# Patient Record
Sex: MALE | Race: WHITE | HISPANIC OR LATINO | URBAN - METROPOLITAN AREA
[De-identification: names, ages, dates, MRNs, and addresses within clinical notes are randomized per-mention and may not be internally consistent; named-entity substitution may affect disease eponyms.]

---

## 2017-07-17 ENCOUNTER — INPATIENT (INPATIENT)
Facility: HOSPITAL | Age: 32
LOS: 17 days | Discharge: HOME | DRG: 418 | End: 2017-08-04
Attending: SURGERY | Admitting: SURGERY
Payer: COMMERCIAL

## 2017-07-17 ENCOUNTER — TRANSCRIPTION ENCOUNTER (OUTPATIENT)
Age: 32
End: 2017-07-17

## 2017-07-17 VITALS
HEART RATE: 73 BPM | TEMPERATURE: 98 F | RESPIRATION RATE: 16 BRPM | DIASTOLIC BLOOD PRESSURE: 92 MMHG | SYSTOLIC BLOOD PRESSURE: 143 MMHG | OXYGEN SATURATION: 97 %

## 2017-07-17 DIAGNOSIS — K80.10 CALCULUS OF GALLBLADDER WITH CHRONIC CHOLECYSTITIS WITHOUT OBSTRUCTION: ICD-10-CM

## 2017-07-17 DIAGNOSIS — R63.8 OTHER SYMPTOMS AND SIGNS CONCERNING FOOD AND FLUID INTAKE: ICD-10-CM

## 2017-07-17 DIAGNOSIS — R74.0 NONSPECIFIC ELEVATION OF LEVELS OF TRANSAMINASE AND LACTIC ACID DEHYDROGENASE [LDH]: ICD-10-CM

## 2017-07-17 DIAGNOSIS — K80.67 CALCULUS OF GALLBLADDER AND BILE DUCT WITH ACUTE AND CHRONIC CHOLECYSTITIS WITH OBSTRUCTION: ICD-10-CM

## 2017-07-17 DIAGNOSIS — Z29.9 ENCOUNTER FOR PROPHYLACTIC MEASURES, UNSPECIFIED: ICD-10-CM

## 2017-07-17 PROBLEM — Z00.00 ENCOUNTER FOR PREVENTIVE HEALTH EXAMINATION: Status: ACTIVE | Noted: 2017-07-17

## 2017-07-17 LAB
ALBUMIN SERPL ELPH-MCNC: 4.9 G/DL — SIGNIFICANT CHANGE UP (ref 3.3–5)
ALP SERPL-CCNC: 243 U/L — HIGH (ref 40–120)
ALT FLD-CCNC: 1107 U/L RC — HIGH (ref 10–45)
ANION GAP SERPL CALC-SCNC: 17 MMOL/L — SIGNIFICANT CHANGE UP (ref 5–17)
APTT BLD: 33.3 SEC — SIGNIFICANT CHANGE UP (ref 27.5–37.4)
AST SERPL-CCNC: 764 U/L — HIGH (ref 10–40)
BASOPHILS # BLD AUTO: 0.1 K/UL — SIGNIFICANT CHANGE UP (ref 0–0.2)
BASOPHILS NFR BLD AUTO: 0.9 % — SIGNIFICANT CHANGE UP (ref 0–2)
BILIRUB DIRECT SERPL-MCNC: 4.9 MG/DL — HIGH (ref 0–0.2)
BILIRUB INDIRECT FLD-MCNC: 4.4 MG/DL — HIGH (ref 0.2–1)
BILIRUB SERPL-MCNC: 9.3 MG/DL — HIGH (ref 0.2–1.2)
BILIRUB SERPL-MCNC: 9.3 MG/DL — HIGH (ref 0.2–1.2)
BUN SERPL-MCNC: 15 MG/DL — SIGNIFICANT CHANGE UP (ref 7–23)
CALCIUM SERPL-MCNC: 10.6 MG/DL — HIGH (ref 8.4–10.5)
CHLORIDE SERPL-SCNC: 95 MMOL/L — LOW (ref 96–108)
CO2 SERPL-SCNC: 26 MMOL/L — SIGNIFICANT CHANGE UP (ref 22–31)
CREAT SERPL-MCNC: 1.11 MG/DL — SIGNIFICANT CHANGE UP (ref 0.5–1.3)
EOSINOPHIL # BLD AUTO: 0.4 K/UL — SIGNIFICANT CHANGE UP (ref 0–0.5)
EOSINOPHIL NFR BLD AUTO: 4.9 % — SIGNIFICANT CHANGE UP (ref 0–6)
GAS PNL BLDV: SIGNIFICANT CHANGE UP
GLUCOSE SERPL-MCNC: 93 MG/DL — SIGNIFICANT CHANGE UP (ref 70–99)
HCT VFR BLD CALC: 50.6 % — HIGH (ref 39–50)
HGB BLD-MCNC: 16.6 G/DL — SIGNIFICANT CHANGE UP (ref 13–17)
INR BLD: 1.15 RATIO — SIGNIFICANT CHANGE UP (ref 0.88–1.16)
LYMPHOCYTES # BLD AUTO: 1.5 K/UL — SIGNIFICANT CHANGE UP (ref 1–3.3)
LYMPHOCYTES # BLD AUTO: 17 % — SIGNIFICANT CHANGE UP (ref 13–44)
MCHC RBC-ENTMCNC: 29.9 PG — SIGNIFICANT CHANGE UP (ref 27–34)
MCHC RBC-ENTMCNC: 32.8 GM/DL — SIGNIFICANT CHANGE UP (ref 32–36)
MCV RBC AUTO: 91.3 FL — SIGNIFICANT CHANGE UP (ref 80–100)
MONOCYTES # BLD AUTO: 0.7 K/UL — SIGNIFICANT CHANGE UP (ref 0–0.9)
MONOCYTES NFR BLD AUTO: 7.6 % — SIGNIFICANT CHANGE UP (ref 2–14)
NEUTROPHILS # BLD AUTO: 6.2 K/UL — SIGNIFICANT CHANGE UP (ref 1.8–7.4)
NEUTROPHILS NFR BLD AUTO: 69.7 % — SIGNIFICANT CHANGE UP (ref 43–77)
PLATELET # BLD AUTO: 233 K/UL — SIGNIFICANT CHANGE UP (ref 150–400)
POTASSIUM SERPL-MCNC: 3.8 MMOL/L — SIGNIFICANT CHANGE UP (ref 3.5–5.3)
POTASSIUM SERPL-SCNC: 3.8 MMOL/L — SIGNIFICANT CHANGE UP (ref 3.5–5.3)
PROT SERPL-MCNC: 8.7 G/DL — HIGH (ref 6–8.3)
PROTHROM AB SERPL-ACNC: 12.5 SEC — SIGNIFICANT CHANGE UP (ref 9.8–12.7)
RBC # BLD: 5.54 M/UL — SIGNIFICANT CHANGE UP (ref 4.2–5.8)
RBC # FLD: 12.3 % — SIGNIFICANT CHANGE UP (ref 10.3–14.5)
SODIUM SERPL-SCNC: 138 MMOL/L — SIGNIFICANT CHANGE UP (ref 135–145)
WBC # BLD: 8.9 K/UL — SIGNIFICANT CHANGE UP (ref 3.8–10.5)
WBC # FLD AUTO: 8.9 K/UL — SIGNIFICANT CHANGE UP (ref 3.8–10.5)

## 2017-07-17 PROCEDURE — 99252 IP/OBS CONSLTJ NEW/EST SF 35: CPT

## 2017-07-17 PROCEDURE — 76705 ECHO EXAM OF ABDOMEN: CPT | Mod: 26

## 2017-07-17 PROCEDURE — 99285 EMERGENCY DEPT VISIT HI MDM: CPT

## 2017-07-17 PROCEDURE — 99222 1ST HOSP IP/OBS MODERATE 55: CPT | Mod: GC

## 2017-07-17 RX ORDER — PIPERACILLIN AND TAZOBACTAM 4; .5 G/20ML; G/20ML
3.38 INJECTION, POWDER, LYOPHILIZED, FOR SOLUTION INTRAVENOUS ONCE
Qty: 0 | Refills: 0 | Status: COMPLETED | OUTPATIENT
Start: 2017-07-17 | End: 2017-07-17

## 2017-07-17 RX ORDER — ONDANSETRON 8 MG/1
4 TABLET, FILM COATED ORAL ONCE
Qty: 0 | Refills: 0 | Status: COMPLETED | OUTPATIENT
Start: 2017-07-17 | End: 2017-07-17

## 2017-07-17 RX ORDER — SODIUM CHLORIDE 9 MG/ML
1000 INJECTION INTRAMUSCULAR; INTRAVENOUS; SUBCUTANEOUS ONCE
Qty: 0 | Refills: 0 | Status: COMPLETED | OUTPATIENT
Start: 2017-07-17 | End: 2017-07-17

## 2017-07-17 RX ORDER — PIPERACILLIN AND TAZOBACTAM 4; .5 G/20ML; G/20ML
3.38 INJECTION, POWDER, LYOPHILIZED, FOR SOLUTION INTRAVENOUS EVERY 8 HOURS
Qty: 0 | Refills: 0 | Status: DISCONTINUED | OUTPATIENT
Start: 2017-07-17 | End: 2017-07-18

## 2017-07-17 RX ORDER — SODIUM CHLORIDE 9 MG/ML
1000 INJECTION INTRAMUSCULAR; INTRAVENOUS; SUBCUTANEOUS
Qty: 0 | Refills: 0 | Status: DISCONTINUED | OUTPATIENT
Start: 2017-07-17 | End: 2017-07-21

## 2017-07-17 RX ORDER — HEPARIN SODIUM 5000 [USP'U]/ML
5000 INJECTION INTRAVENOUS; SUBCUTANEOUS EVERY 8 HOURS
Qty: 0 | Refills: 0 | Status: DISCONTINUED | OUTPATIENT
Start: 2017-07-17 | End: 2017-07-18

## 2017-07-17 RX ADMIN — SODIUM CHLORIDE 1000 MILLILITER(S): 9 INJECTION INTRAMUSCULAR; INTRAVENOUS; SUBCUTANEOUS at 17:07

## 2017-07-17 RX ADMIN — PIPERACILLIN AND TAZOBACTAM 25 GRAM(S): 4; .5 INJECTION, POWDER, LYOPHILIZED, FOR SOLUTION INTRAVENOUS at 23:01

## 2017-07-17 RX ADMIN — SODIUM CHLORIDE 150 MILLILITER(S): 9 INJECTION INTRAMUSCULAR; INTRAVENOUS; SUBCUTANEOUS at 23:02

## 2017-07-17 RX ADMIN — ONDANSETRON 4 MILLIGRAM(S): 8 TABLET, FILM COATED ORAL at 17:07

## 2017-07-17 RX ADMIN — HEPARIN SODIUM 5000 UNIT(S): 5000 INJECTION INTRAVENOUS; SUBCUTANEOUS at 23:40

## 2017-07-17 RX ADMIN — PIPERACILLIN AND TAZOBACTAM 200 GRAM(S): 4; .5 INJECTION, POWDER, LYOPHILIZED, FOR SOLUTION INTRAVENOUS at 18:47

## 2017-07-17 NOTE — H&P ADULT - NSHPREVIEWOFSYSTEMS_GEN_ALL_CORE
Review of Systems:   CONSTITUTIONAL: No fever, weight changes, fatigue, + decreased appetite   EYES: No eye pain, visual disturbances, or discharge  ENMT:  No difficulty hearing, tinnitus, vertigo; No sinus or throat pain  NECK: No pain or stiffness  RESPIRATORY: No cough, wheezing, chills or hemoptysis; No shortness of breath  CARDIOVASCULAR: No chest pain, palpitations, dizziness, or leg swelling  GASTROINTESTINAL:  No vomiting, or hematemesis; No diarrhea or constipation. No melena or hematochezia. +abdominal pain.  GENITOURINARY: No dysuria, frequency, hematuria, or incontinence. + Dark urine  NEUROLOGICAL: No headaches, memory loss, loss of strength, numbness, or tremors  SKIN: No itching, burning, or lesions. + chronic eczema   ENDOCRINE: No heat or cold intolerance; No hair loss  MUSCULOSKELETAL: No joint pain or swelling; No muscle, back, or extremity pain  PSYCHIATRIC: No depression, anxiety, mood swings, or difficulty sleeping  HEME/LYMPH: No easy bruising, or bleeding gums  ALLERY AND IMMUNOLOGIC: No hives or eczema

## 2017-07-17 NOTE — ED PROVIDER NOTE - PHYSICAL EXAMINATION
Bettye Hart, DO:   Gen: Well appearing, NAD  Head: NCAT  HEENT: PERRL, MMM with mucosal icterus, normal conjunctiva, icteric, neck supple  Lung: CTAB, no adventitious sounds  CV: RRR, no murmurs, rubs or gallops  Abd: soft, TTP in RUQ with +[place] otherwise NT, no rebound or guarding, no CVAT  MSK: No edema, no visible deformities  Neuro: No focal neurologic deficits. CN II-XII grossly intact. 5/5 strength and normal sensation in all extremities.  Skin: Warm and dry, no evidence of rash  Psych: normal mood and affect Bettye Hart, DO:   Gen: Well appearing, NAD  Head: NCAT  HEENT: PERRL, MMM with mucosal jaundice, normal conjunctiva, icteric, neck supple  Lung: CTAB, no adventitious sounds  CV: RRR, no murmurs, rubs or gallops  Abd: soft, TTP in RUQ with +[place] otherwise NT, no rebound or guarding, no CVAT  MSK: No edema, no visible deformities  Neuro: No focal neurologic deficits. CN II-XII grossly intact. 5/5 strength and normal sensation in all extremities.  Skin: Warm and dry, no evidence of rash  Psych: normal mood and affect

## 2017-07-17 NOTE — ED ADULT NURSE NOTE - NS ED NURSE LEVEL OF CONSCIOUSNESS SPEECH
Problem: Goal Outcome Summary  Goal: Goal Outcome Summary  PT 7B: Pt tx supine<>sit and sit<>stand mod I. Pt amb 125 feet + 325 feet with 1 UE support at IV pole and SBA. Pt agreeable to trial amb with no UE support for repeated bouts of ~10-15 feet, with SBA-CGA. Pt negotiated 4 stairs with SBA and BUE support at handrails, to mimic home environment. SpO2 85-93% on RA to 1 L O2. Pending continued progress with amb, anticipate disch home with assist and OP PT to maximize strength and IND.           Speaking Coherently

## 2017-07-17 NOTE — H&P ADULT - ASSESSMENT
31 y/o M w/ no sig PMHx p/w R epigastric pain x 3 days and dark urine, found to have US finding concerning for choledocolithiasis and acute cholecystitis.

## 2017-07-17 NOTE — ED ADULT NURSE REASSESSMENT NOTE - NS ED NURSE REASSESS COMMENT FT1
1700. A/O x3, NAD, denies pain, no dizziness/lightheadedness, awaiting U/S results, family at bedside.

## 2017-07-17 NOTE — ED PROVIDER NOTE - NS ED ROS FT
Bettye Hart, DO: ROS: denies HA, weakness, dizziness, SOB, diaphoresis, joint pain, neuro deficits, dysuria/hematuria, rash.

## 2017-07-17 NOTE — ED ADULT NURSE NOTE - OBJECTIVE STATEMENT
Pt is a 33 yo M who came to the Ed amb c/o epigastric pain x 3days. Seen at an urgicenter today, sent to the ED for evaluation of dark urine.  Pain is constant, worse with deep breaths. States he woke up this morning with no pain, but pain returned about 15 minutes later. No fevers/chills, nausea/vomiting, diarrhea. No change in bowel/urine habits.

## 2017-07-17 NOTE — H&P ADULT - NSHPSOCIALHISTORY_GEN_ALL_CORE
Social History:    Marital Status:  (   )    ( X  ) Single    (   )    (  )   Occupation:    Lives with: ( X ) alone  (  ) children   (  ) spouse   (  ) parents  (  ) other    Substance Use (street drugs): ( X ) never used  (  ) other:  Tobacco Usage:  (  X ) never smoked   (   ) former smoker   (   ) current smoker  (     ) pack year  (        ) last cigarette date  Alcohol Usage: Social drinker, last drink 2-3 weeks ago

## 2017-07-17 NOTE — H&P ADULT - FAMILY HISTORY
Mother  Still living? Unknown  Family history of coronary artery disease in mother, Age at diagnosis: Age Unknown     Father  Still living? Unknown  Family history of gallbladder disease, Age at diagnosis: Age Unknown     Grandparent  Still living? Unknown  Family history of gallbladder disease, Age at diagnosis: Age Unknown

## 2017-07-17 NOTE — H&P ADULT - PROBLEM SELECTOR PLAN 1
- dilated CBD and echo signs of acute heaven and +Alarcon sign  - will check MRCP and GI consulted, can consider ERCP pending MRCP results  - Surgery consulted for possible cholecystectomy   - - dilated CBD and echo signs of acute heaven and +Alarcon sign  - will check MRCP and GI consulted, can consider ERCP pending MRCP results  - Surgery consulted for possible cholecystectomy   - cover with zosyn for now   - NS at 150cc/hr

## 2017-07-17 NOTE — H&P ADULT - NSHPLABSRESULTS_GEN_ALL_CORE
16.6   8.9   )-----------( 233      ( 17 Jul 2017 17:08 )             50.6       07-17    138  |  95<L>  |  15  ----------------------------<  93  3.8   |  26  |  1.11    Ca    10.6<H>      17 Jul 2017 17:08    TPro  8.7<H>  /  Alb  4.9  /  TBili  9.3<H>  /  DBili  4.9<H>  /  AST  764<H>  /  ALT  1107<H>  /  AlkPhos  243<H>  07-17           PT/INR - ( 17 Jul 2017 17:08 )   PT: 12.5 sec;   INR: 1.15 ratio         PTT - ( 17 Jul 2017 17:08 )  PTT:33.3 sec    Lipase, Serum: 44 U/L (07.17.17 @ 17:08)        < from: US Echo Abdomen Limited (ED) (07.17.17 @ 17:31) >    IMPRESSION:Dilated CBD suggesting choledocolithiasis, with additional   findings concerning for acute cholecystitis    < end of copied text >

## 2017-07-17 NOTE — ED PROVIDER NOTE - PROGRESS NOTE DETAILS
Bettye Hart, DO: Surgery paged - will see pt. Pending call back from GI. Bettye Hart DO: GI consulted - will see patient. Bettye Hart DO: Left message for Dr. Jenkins's answering service. Bettye Hart DO: Dr. Jenkins admits to hospitalist - will admit.

## 2017-07-17 NOTE — ED ADULT NURSE REASSESSMENT NOTE - NS ED NURSE REASSESS COMMENT FT1
report received from mayra gudino. pt resting comfortably in bed. pt denies chest pain/sob/n/v/pain/fever/chills. pt complaining of decreased po intake. vital signs stable. breath sounds clear and equal bilaterally. skin warm dry and intact. AOX3. ambulates with steady gait. admitting team at bedside. safety maintained. will continue to monitor.

## 2017-07-17 NOTE — H&P ADULT - NSHPPHYSICALEXAM_GEN_ALL_CORE
PHYSICAL EXAM:    GENERAL: Comfortable, no acute distress   HEAD:  Normocephalic, atraumatic  EYES: EOMI, PERRLA, sclero icterus   HEENT: Moist mucous membranes  NECK: Supple, No JVD  NERVOUS SYSTEM:  Alert & Oriented X3, Motor Strength 5/5 B/L upper and lower extremities  CHEST/LUNG: Clear to auscultation bilaterally  HEART: Regular rate and rhythm, no murmur   ABDOMEN: Soft, Nondistended, Bowel sounds present, + tenderness in RUQ, +Alarcon sign   EXTREMITIES:   No clubbing, cyanosis, or edema  MUSCULOSKELETAL: No muscle tenderness, no joint tenderness  SKIN:  warm and dry, no rash

## 2017-07-17 NOTE — H&P ADULT - ATTENDING COMMENTS
NIGHT HOSPITALIST:  7/17/2017 22:44   Patient UNKNOWN to me previously, assigned to me at this point via the ER to admit this 31 y/o M with no prior past medical history--patient seen with patient's father in attendance--patient followed by Dr. Jenkins as above--patient self refers to [place] following 3 days of RIGHT to epigastric pain with scleral icterus and dark urine with diaphoresis and nausea.  Patient with symptoms with no radiation.  No red blood per rectum or melena.  No back pain, no tearing back pain.  No chest pain/pressure.  No dyspnea.  No fever, no rigors.  No weight loss.  Denies anorexia.  No HA, no focal weakness.  No sick contacts.  Remaining review of systems not contributory.  No tobacco or ethanol history.  Family history as above.  Physical exam with an obese, young, non-toxic M with scleral icterus.  BP  152/93  Tm 97.6F   HR  74  RR 14.  96% on RA.  HEENT< scleral icterus.  PERRL, EOMI, neck supple, chest clear, cor s1 s2, abdomen obese, soft, mild RUQ tenderness with no rebound.  Ext no c/c/e.  Neurologic exam AxOx3.  speech fluent.  Cognition intact.  UE/LE 5/5.  WBC 8.9.  Hgb 16.6  Platelets of 233K.  Random glucose of 93.  Cr 1.1.  K+ 3.8.  Ac++ 10.6  Alb 4.9.  total bili 8.7.  Alk phos 243,   AST 1107.  Lipase 44.  US with CBD dilatation and US evidence of acute cholecystitis.  EKG tracing reviewed with NSR at 78 with non-specific ST T changes.  Chest radiograph ordered.  Seen by surgery as above, with recommendation for GI noted.  IV antibiotics and IVF.  Clears for now.  DVT prophylaxis.  Erythrocytosis and mild calcium elevation likely from clinical dehydration.  Hepatitis assays as above.  Will need a decision point on the GB.  Patient/ father in attendance aware of course and agree with plan/care as above.  Care reviewed with Dr. Cote.  Care assumed by the DAY HOSPITALIST. NIGHT HOSPITALIST:  7/17/2017 22:44   Patient UNKNOWN to me previously, assigned to me at this point via the ER to admit this 33 y/o M with no prior past medical history--patient seen with patient's father in attendance--patient followed by Dr. Jenkins as above--patient self refers to [place] following 3 days of RIGHT to epigastric pain with scleral icterus and dark urine with diaphoresis and nausea.  Patient with symptoms with no radiation.  No red blood per rectum or melena.  No back pain, no tearing back pain.  No chest pain/pressure.  No dyspnea.  No fever, no rigors.  No weight loss.  Denies anorexia.  No HA, no focal weakness.  No sick contacts.  Remaining review of systems not contributory.  No tobacco or ethanol history.  Family history as above.  Physical exam with an obese, young, non-toxic M with scleral icterus.  BP  152/93  Tm 97.6F   HR  74  RR 14.  96% on RA.  HEENT< scleral icterus.  PERRL, EOMI, neck supple, chest clear, cor s1 s2, abdomen obese, soft, mild RUQ tenderness with no rebound.  Ext no c/c/e.  Neurologic exam AxOx3.  speech fluent.  Cognition intact.  UE/LE 5/5.  WBC 8.9.  Hgb 16.6  Platelets of 233K.  Random glucose of 93.  Cr 1.1.  K+ 3.8.  Ca++ 10.6  Alb 4.9.  total bili 8.7.  Alk phos 243,   AST 1107.  Lipase 44.  US with CBD dilatation and US evidence of acute cholecystitis.  EKG tracing reviewed with NSR at 78 with non-specific ST T changes.  Chest radiograph ordered.  Seen by surgery as above, with recommendation for GI noted.  IV antibiotics and IVF.  Clears for now.  DVT prophylaxis.  Erythrocytosis and mild calcium elevation likely from clinical dehydration.  Hepatitis assays as above.  Will need a decision point on the GB.  Patient/ father in attendance aware of course and agree with plan/care as above.  Care reviewed with Dr. Cote.  Care assumed by the DAY HOSPITALIST.

## 2017-07-17 NOTE — ED PROVIDER NOTE - OBJECTIVE STATEMENT
Bettye Hailey, DO: 32M with hx of eczema here with epigastric pain x 3 days now with darkened urine. Pain constant, worse with deep breaths & no alleviating factors. No fevers/chills, nausea/vomiting, diarrhea. Occassional drinker with last drink several weeks ago. No hx of high cholesterol. No recent travel. No hx of gallstones. +pleuritic CP - no family hx or personal hx of blood clots, no leg edema or calf pain, no cough, no recent surgeries, no hx of cancer.     PMD: Dr. Ronald Jenkins.

## 2017-07-17 NOTE — ED PROVIDER NOTE - ATTENDING CONTRIBUTION TO CARE
I have examined and evaluated this patient with the above resident or PA, and agree with the documented clinical history, exam and plan.   Briefly: 33yo M, c/o RUQ pain and jaundice; tender in RUQ and epigastrium; found on labs to have elevated bilirubin and LFTs; US performed shows dilated CBD and signs of cholecystitis; surgery and GI consulted as patent likely has choledocolithiasis causing obstruction and cholecystitis.  Zosyn given.

## 2017-07-17 NOTE — ED PROVIDER NOTE - MEDICAL DECISION MAKING DETAILS
Bettye Hart, DO: 32M with intra vs. hepatic cholestastis. Will do RUQ sono in setting of jaundice. Will get labs & plan for admit.

## 2017-07-17 NOTE — ED PROCEDURE NOTE - PROCEDURE ADDITIONAL DETAILS
POCUS: Emergency Department Focused Ultrasound performed at patient's bedside.  The complete report will be available in PACS.   RUQ US:  dilated GB  trace pericholecystic fluid  +sonographic arora's  gallbladder hydrops  dilated CBD
POCUS: Emergency Department Focused Ultrasound performed at patient's bedside.  The complete report will be available in PACS.

## 2017-07-17 NOTE — H&P ADULT - PROBLEM SELECTOR PLAN 2
- elevated Alk Phos, AST and ALT  - checking Hep A, B and C, ERMIAS, smooth muscle antibody, EBV, CMV and microsomal antibody assay to r/o autoimmune or infectious cause

## 2017-07-17 NOTE — H&P ADULT - HISTORY OF PRESENT ILLNESS
33 y/o M w/ no sig PMHx p/w epigastric pain x 3 days and dark urine.     In ED,   Vitals T 97.7, HR 73, /92, RR 16, SpO2 97 on RA  Labs- T bili 9.3, Alk phos 243, , ALT 1107  Given Zosyn, 1L NS, Zofran 33 y/o M w/ no sig PMHx p/w R epigastric pain x 3 days and dark urine.   Patient reports developing a new R sided epigastric pain about  3days prior to admission while waiting to get a hair cut. Patient describes it as a discomfort that was crampy or pressure like sensation that was constant and didn't move around or radiate. While at the gardner, the pain became more severe and was associated with mild diaphoresis, and nausea. At this time he lied down for about an hour an the pain return to a discomfort and the nausea and diaphoresis resolved and did not return. Also around this time he noticed his urine became an asher color and his eyes started to become yellow. The discomfort persisted and not worsen or change in nature, so he went to urgent care. When they say his asher colored urine, they sent him to the ED.   Denies any F/C, N/V now, CP, SOB, constipation, diarrhea, recent travel, change in diet, sick contacts, or headache.   Acknowledges decreased appetite.   Patient reports his father, grandfather and cousin all had their gallbladders removed for "gallbladder infections."   Patient reports he had similar abdominal pain several months ago that resolved after a day and was also associated with yellow eyes. He did not seek medical attention at that time.     In ED,   Vitals T 97.7, HR 73, /92, RR 16, SpO2 97 on RA  Labs- T bili 9.3, Alk phos 243, , ALT 1107  Given Zosyn, 1L NS, Zofran

## 2017-07-17 NOTE — CONSULT NOTE ADULT - SUBJECTIVE AND OBJECTIVE BOX
General Surgery Consult  Consulting surgical team: [facility]  Consulting attending: Dr. Palacios  Patient seen and examined: 07-17-17 @ 18:35    HPI:  Patient is a 32y Male who presents with 3 days of constant epigastric pain. Associated with chills one time. No fevers, nausea, vomiting, diarrhea or constipation. Also noticed darkened urine since yesterday. Reports that his wife has noticed yellowing of his eyes yesterday.    Initially went to urgent care and was told to come to ED.  Bedside U/S done in ED showed gallstones with CBD measuring 0.76.cm.  ED T.bili 9.3, AST//1107      PAST MEDICAL HISTORY:  Excema    PAST SURGICAL HISTORY:  No significant past surgical history      ALLERGIES:  No Known Allergies      MEDICATIONS:      VITALS & I/Os:  Vital Signs Last 24 Hrs  T(C): 36.5 (17 Jul 2017 14:10), Max: 36.5 (17 Jul 2017 14:10)  T(F): 97.7 (17 Jul 2017 14:10), Max: 97.7 (17 Jul 2017 14:10)  HR: 73 (17 Jul 2017 14:10) (73 - 73)  BP: 143/92 (17 Jul 2017 14:10) (143/92 - 143/92)  BP(mean): --  RR: 16 (17 Jul 2017 14:10) (16 - 16)  SpO2: 97% (17 Jul 2017 14:10) (97% - 97%)    I&O's Summary      PHYSICAL EXAM:  General: No acute distress  Respiratory: Nonlabored  Cardiovascular: RRR  Abdominal: Soft, nondistended, nontender. No rebound or guarding. No organomegaly, no palpable mass.  Extremities: Warm    LABS:                        16.6   8.9   )-----------( 233      ( 17 Jul 2017 17:08 )             50.6     07-17    138  |  95<L>  |  15  ----------------------------<  93  3.8   |  26  |  1.11    Ca    10.6<H>      17 Jul 2017 17:08    TPro  8.7<H>  /  Alb  4.9  /  TBili  9.3<H>  /  DBili  4.9<H>  /  AST  764<H>  /  ALT  1107<H>  /  AlkPhos  243<H>  07-17    Lactate:  07-17 @ 17:08  1.9    PT/INR - ( 17 Jul 2017 17:08 )   PT: 12.5 sec;   INR: 1.15 ratio         PTT - ( 17 Jul 2017 17:08 )  PTT:33.3 sec      IMAGING:      INTERPRETATION:  Grayscale imaging of the right upper quadrant was   performed.    The gallbladder was visualized and scanned in longitudinal and transverse   planes. In longitudinal planes, the gallbladder measures 8.24 cm x 5.93   cm; width greater than 5cm is concerning for hydrops.  The anterior gallbladder wall measured  0.31.cm.  The common bile duct measured 0.76.cm.  Gallstones present.  Sludge not present.  questionable trace pericholecystic fluid present.  Gallbladder wall edema not present.  Sonographic Alarcon's sign present.    IMPRESSION:Dilated CBD suggesting choledocolithiasis, with additional   findings concerning for acute cholecystitis        ASSESSMENT: 32 year old male with choledocholithiasis  - Appreciate GI recs  - Will likely need EUS/ERCP  - Will need lap heaven eventually when liver enzymes normalize  - No surgical intervention at this time, will follow General Surgery Consult  Consulting surgical team: [facility]  Consulting attending: Dr. Palacios  Patient seen and examined: 07-17-17 @ 18:35    HPI:  Patient is a 32y Male who presents with 3 days of constant epigastric pain. Associated with chills one time. No fevers, nausea, vomiting, diarrhea or constipation. Also noticed darkened urine since yesterday. Reports that his wife has noticed yellowing of his eyes yesterday.    Initially went to urgent care and was told to come to ED.  Bedside U/S done in ED showed gallstones with CBD measuring 0.76.cm.  ED T.bili 9.3, AST//1107      PAST MEDICAL HISTORY:  Excema    PAST SURGICAL HISTORY:  No significant past surgical history      ALLERGIES:  No Known Allergies      MEDICATIONS:      VITALS & I/Os:  Vital Signs Last 24 Hrs  T(C): 36.5 (17 Jul 2017 14:10), Max: 36.5 (17 Jul 2017 14:10)  T(F): 97.7 (17 Jul 2017 14:10), Max: 97.7 (17 Jul 2017 14:10)  HR: 73 (17 Jul 2017 14:10) (73 - 73)  BP: 143/92 (17 Jul 2017 14:10) (143/92 - 143/92)  BP(mean): --  RR: 16 (17 Jul 2017 14:10) (16 - 16)  SpO2: 97% (17 Jul 2017 14:10) (97% - 97%)    I&O's Summary      PHYSICAL EXAM:  General: No acute distress  Respiratory: Nonlabored  Cardiovascular: RRR  Abdominal: Soft, nondistended, tender to palpation on RUQ. No rebound or guarding  Extremities: Warm    LABS:                        16.6   8.9   )-----------( 233      ( 17 Jul 2017 17:08 )             50.6     07-17    138  |  95<L>  |  15  ----------------------------<  93  3.8   |  26  |  1.11    Ca    10.6<H>      17 Jul 2017 17:08    TPro  8.7<H>  /  Alb  4.9  /  TBili  9.3<H>  /  DBili  4.9<H>  /  AST  764<H>  /  ALT  1107<H>  /  AlkPhos  243<H>  07-17    Lactate:  07-17 @ 17:08  1.9    PT/INR - ( 17 Jul 2017 17:08 )   PT: 12.5 sec;   INR: 1.15 ratio         PTT - ( 17 Jul 2017 17:08 )  PTT:33.3 sec      IMAGING:      INTERPRETATION:  Grayscale imaging of the right upper quadrant was   performed.    The gallbladder was visualized and scanned in longitudinal and transverse   planes. In longitudinal planes, the gallbladder measures 8.24 cm x 5.93   cm; width greater than 5cm is concerning for hydrops.  The anterior gallbladder wall measured  0.31.cm.  The common bile duct measured 0.76.cm.  Gallstones present.  Sludge not present.  questionable trace pericholecystic fluid present.  Gallbladder wall edema not present.  Sonographic Alarcon's sign present.    IMPRESSION:Dilated CBD suggesting choledocolithiasis, with additional   findings concerning for acute cholecystitis        ASSESSMENT: 32 year old male with choledocholithiasis  - Appreciate GI recs  - Will likely need EUS/ERCP  - Will need lap heaven eventually when liver enzymes normalize  - No surgical intervention at this time, will follow General Surgery Consult  Consulting surgical team: [facility]  Consulting attending: Dr. Palacios  Patient seen and examined: 07-17-17 @ 18:35    HPI:  Patient is a 32y Male who presents with 3 days of constant epigastric pain. Associated with chills one time. No fevers, nausea, vomiting, diarrhea or constipation. Also noticed darkened urine since yesterday. Reports that his wife has noticed yellowing of his eyes yesterday.    Initially went to urgent care and was told to come to ED.  Bedside U/S done in ED showed gallstones with CBD measuring 0.76.cm.  ED T.bili 9.3, AST//1107      PAST MEDICAL HISTORY:  Excema    PAST SURGICAL HISTORY:  No significant past surgical history      ALLERGIES:  No Known Allergies      MEDICATIONS:      VITALS & I/Os:  Vital Signs Last 24 Hrs  T(C): 36.5 (17 Jul 2017 14:10), Max: 36.5 (17 Jul 2017 14:10)  T(F): 97.7 (17 Jul 2017 14:10), Max: 97.7 (17 Jul 2017 14:10)  HR: 73 (17 Jul 2017 14:10) (73 - 73)  BP: 143/92 (17 Jul 2017 14:10) (143/92 - 143/92)  BP(mean): --  RR: 16 (17 Jul 2017 14:10) (16 - 16)  SpO2: 97% (17 Jul 2017 14:10) (97% - 97%)    I&O's Summary      PHYSICAL EXAM:  General: No acute distress  Respiratory: Nonlabored  Cardiovascular: RRR  Abdominal: Soft, nondistended, tender to palpation on RUQ. No rebound or guarding  Extremities: Warm    LABS:                        16.6   8.9   )-----------( 233      ( 17 Jul 2017 17:08 )             50.6     07-17    138  |  95<L>  |  15  ----------------------------<  93  3.8   |  26  |  1.11    Ca    10.6<H>      17 Jul 2017 17:08    TPro  8.7<H>  /  Alb  4.9  /  TBili  9.3<H>  /  DBili  4.9<H>  /  AST  764<H>  /  ALT  1107<H>  /  AlkPhos  243<H>  07-17    Lactate:  07-17 @ 17:08  1.9    PT/INR - ( 17 Jul 2017 17:08 )   PT: 12.5 sec;   INR: 1.15 ratio         PTT - ( 17 Jul 2017 17:08 )  PTT:33.3 sec      IMAGING:      INTERPRETATION:  Grayscale imaging of the right upper quadrant was   performed.    The gallbladder was visualized and scanned in longitudinal and transverse   planes. In longitudinal planes, the gallbladder measures 8.24 cm x 5.93   cm; width greater than 5cm is concerning for hydrops.  The anterior gallbladder wall measured  0.31.cm.  The common bile duct measured 0.76.cm.  Gallstones present.  Sludge not present.  questionable trace pericholecystic fluid present.  Gallbladder wall edema not present.  Sonographic Alarcon's sign present.    IMPRESSION:Dilated CBD suggesting choledocolithiasis, with additional   findings concerning for acute cholecystitis        ASSESSMENT: 32 year old male with choledocholithiasis  - Appreciate GI recs  - F/u MRCP  - Check hepatitis panel  - No surgical intervention at this time, will follow  - Patient seen with Dr. Palacios

## 2017-07-18 DIAGNOSIS — I81 PORTAL VEIN THROMBOSIS: ICD-10-CM

## 2017-07-18 DIAGNOSIS — D69.9 HEMORRHAGIC CONDITION, UNSPECIFIED: ICD-10-CM

## 2017-07-18 LAB
ALBUMIN SERPL ELPH-MCNC: 4.4 G/DL — SIGNIFICANT CHANGE UP (ref 3.3–5)
ALP SERPL-CCNC: 233 U/L — HIGH (ref 40–120)
ALT FLD-CCNC: 1028 U/L RC — HIGH (ref 10–45)
ANION GAP SERPL CALC-SCNC: 15 MMOL/L — SIGNIFICANT CHANGE UP (ref 5–17)
APTT BLD: 32.4 SEC — SIGNIFICANT CHANGE UP (ref 27.5–37.4)
AST SERPL-CCNC: 643 U/L — HIGH (ref 10–40)
BILIRUB SERPL-MCNC: 8.3 MG/DL — HIGH (ref 0.2–1.2)
BUN SERPL-MCNC: 13 MG/DL — SIGNIFICANT CHANGE UP (ref 7–23)
CALCIUM SERPL-MCNC: 10 MG/DL — SIGNIFICANT CHANGE UP (ref 8.4–10.5)
CHLORIDE SERPL-SCNC: 99 MMOL/L — SIGNIFICANT CHANGE UP (ref 96–108)
CMV DNA CSF QL NAA+PROBE: SIGNIFICANT CHANGE UP
CMV IGG FLD QL: 1.3 U/ML — HIGH
CMV IGG SERPL-IMP: POSITIVE
CMV IGM FLD-ACNC: <8 AU/ML — SIGNIFICANT CHANGE UP
CMV IGM SERPL QL: NEGATIVE — SIGNIFICANT CHANGE UP
CO2 SERPL-SCNC: 25 MMOL/L — SIGNIFICANT CHANGE UP (ref 22–31)
CREAT SERPL-MCNC: 0.92 MG/DL — SIGNIFICANT CHANGE UP (ref 0.5–1.3)
EBV EA AB SER IA-ACNC: <5 U/ML — SIGNIFICANT CHANGE UP
EBV EA AB TITR SER IF: POSITIVE
EBV EA IGG SER-ACNC: NEGATIVE — SIGNIFICANT CHANGE UP
EBV NA IGG SER IA-ACNC: 420 U/ML — HIGH
EBV PATRN SPEC IB-IMP: SIGNIFICANT CHANGE UP
EBV VCA IGG AVIDITY SER QL IA: NEGATIVE — SIGNIFICANT CHANGE UP
EBV VCA IGM SER IA-ACNC: <10 U/ML — SIGNIFICANT CHANGE UP
EBV VCA IGM SER IA-ACNC: <10 U/ML — SIGNIFICANT CHANGE UP
EBV VCA IGM TITR FLD: NEGATIVE — SIGNIFICANT CHANGE UP
GLUCOSE SERPL-MCNC: 105 MG/DL — HIGH (ref 70–99)
HAV IGM SER-ACNC: SIGNIFICANT CHANGE UP
HAV IGM SER-ACNC: SIGNIFICANT CHANGE UP
HBV CORE IGM SER-ACNC: SIGNIFICANT CHANGE UP
HBV CORE IGM SER-ACNC: SIGNIFICANT CHANGE UP
HBV SURFACE AG SER-ACNC: SIGNIFICANT CHANGE UP
HBV SURFACE AG SER-ACNC: SIGNIFICANT CHANGE UP
HCT VFR BLD CALC: 47.2 % — SIGNIFICANT CHANGE UP (ref 39–50)
HCV AB S/CO SERPL IA: 0.07 S/CO — SIGNIFICANT CHANGE UP
HCV AB S/CO SERPL IA: 0.07 S/CO — SIGNIFICANT CHANGE UP
HCV AB SERPL-IMP: SIGNIFICANT CHANGE UP
HCV AB SERPL-IMP: SIGNIFICANT CHANGE UP
HGB BLD-MCNC: 15.1 G/DL — SIGNIFICANT CHANGE UP (ref 13–17)
INR BLD: 1.11 RATIO — SIGNIFICANT CHANGE UP (ref 0.88–1.16)
INR BLD: 1.11 RATIO — SIGNIFICANT CHANGE UP (ref 0.88–1.16)
MCHC RBC-ENTMCNC: 29.1 PG — SIGNIFICANT CHANGE UP (ref 27–34)
MCHC RBC-ENTMCNC: 32.1 GM/DL — SIGNIFICANT CHANGE UP (ref 32–36)
MCV RBC AUTO: 90.7 FL — SIGNIFICANT CHANGE UP (ref 80–100)
PCP SPEC-MCNC: SIGNIFICANT CHANGE UP
PLATELET # BLD AUTO: 197 K/UL — SIGNIFICANT CHANGE UP (ref 150–400)
POTASSIUM SERPL-MCNC: 4.1 MMOL/L — SIGNIFICANT CHANGE UP (ref 3.5–5.3)
POTASSIUM SERPL-SCNC: 4.1 MMOL/L — SIGNIFICANT CHANGE UP (ref 3.5–5.3)
PROT SERPL-MCNC: 7.6 G/DL — SIGNIFICANT CHANGE UP (ref 6–8.3)
PROTHROM AB SERPL-ACNC: 12 SEC — SIGNIFICANT CHANGE UP (ref 9.8–12.7)
PROTHROM AB SERPL-ACNC: 12.1 SEC — SIGNIFICANT CHANGE UP (ref 9.8–12.7)
RBC # BLD: 5.2 M/UL — SIGNIFICANT CHANGE UP (ref 4.2–5.8)
RBC # FLD: 12.1 % — SIGNIFICANT CHANGE UP (ref 10.3–14.5)
SMOOTH MUSCLE AB SER-ACNC: SIGNIFICANT CHANGE UP
SODIUM SERPL-SCNC: 139 MMOL/L — SIGNIFICANT CHANGE UP (ref 135–145)
WBC # BLD: 7.2 K/UL — SIGNIFICANT CHANGE UP (ref 3.8–10.5)
WBC # FLD AUTO: 7.2 K/UL — SIGNIFICANT CHANGE UP (ref 3.8–10.5)

## 2017-07-18 PROCEDURE — 93975 VASCULAR STUDY: CPT | Mod: 26

## 2017-07-18 PROCEDURE — 99254 IP/OBS CNSLTJ NEW/EST MOD 60: CPT | Mod: GC

## 2017-07-18 PROCEDURE — 99232 SBSQ HOSP IP/OBS MODERATE 35: CPT

## 2017-07-18 PROCEDURE — 71010: CPT | Mod: 26

## 2017-07-18 PROCEDURE — 99232 SBSQ HOSP IP/OBS MODERATE 35: CPT | Mod: GC

## 2017-07-18 PROCEDURE — 74183 MRI ABD W/O CNTR FLWD CNTR: CPT | Mod: 26

## 2017-07-18 RX ORDER — HEPARIN SODIUM 5000 [USP'U]/ML
4500 INJECTION INTRAVENOUS; SUBCUTANEOUS EVERY 6 HOURS
Qty: 0 | Refills: 0 | Status: DISCONTINUED | OUTPATIENT
Start: 2017-07-18 | End: 2017-07-18

## 2017-07-18 RX ORDER — HEPARIN SODIUM 5000 [USP'U]/ML
INJECTION INTRAVENOUS; SUBCUTANEOUS
Qty: 25000 | Refills: 0 | Status: DISCONTINUED | OUTPATIENT
Start: 2017-07-18 | End: 2017-07-18

## 2017-07-18 RX ORDER — HEPARIN SODIUM 5000 [USP'U]/ML
4500 INJECTION INTRAVENOUS; SUBCUTANEOUS EVERY 6 HOURS
Qty: 0 | Refills: 0 | Status: DISCONTINUED | OUTPATIENT
Start: 2017-07-18 | End: 2017-07-19

## 2017-07-18 RX ORDER — HEPARIN SODIUM 5000 [USP'U]/ML
9500 INJECTION INTRAVENOUS; SUBCUTANEOUS EVERY 6 HOURS
Qty: 0 | Refills: 0 | Status: DISCONTINUED | OUTPATIENT
Start: 2017-07-18 | End: 2017-07-19

## 2017-07-18 RX ORDER — HEPARIN SODIUM 5000 [USP'U]/ML
INJECTION INTRAVENOUS; SUBCUTANEOUS
Qty: 25000 | Refills: 0 | Status: DISCONTINUED | OUTPATIENT
Start: 2017-07-18 | End: 2017-07-19

## 2017-07-18 RX ORDER — HEPARIN SODIUM 5000 [USP'U]/ML
9500 INJECTION INTRAVENOUS; SUBCUTANEOUS EVERY 6 HOURS
Qty: 0 | Refills: 0 | Status: DISCONTINUED | OUTPATIENT
Start: 2017-07-18 | End: 2017-07-18

## 2017-07-18 RX ADMIN — PIPERACILLIN AND TAZOBACTAM 25 GRAM(S): 4; .5 INJECTION, POWDER, LYOPHILIZED, FOR SOLUTION INTRAVENOUS at 14:51

## 2017-07-18 RX ADMIN — HEPARIN SODIUM 2100 UNIT(S)/HR: 5000 INJECTION INTRAVENOUS; SUBCUTANEOUS at 18:58

## 2017-07-18 RX ADMIN — HEPARIN SODIUM 2100 UNIT(S)/HR: 5000 INJECTION INTRAVENOUS; SUBCUTANEOUS at 15:01

## 2017-07-18 RX ADMIN — HEPARIN SODIUM 5000 UNIT(S): 5000 INJECTION INTRAVENOUS; SUBCUTANEOUS at 05:12

## 2017-07-18 RX ADMIN — PIPERACILLIN AND TAZOBACTAM 25 GRAM(S): 4; .5 INJECTION, POWDER, LYOPHILIZED, FOR SOLUTION INTRAVENOUS at 05:12

## 2017-07-18 NOTE — CONSULT NOTE ADULT - CONSULT REASON
concern for choledocolithiasis
Anticoagulation management for portal vein thrombosis
choledocholithiasis

## 2017-07-18 NOTE — PROGRESS NOTE ADULT - PROBLEM SELECTOR PLAN 4
- Diet- Clear liquids for now - DVT ppx- low risk, patient ambulating, no pharm ppx needed - Abd doppler U/S showed evidence of partial portal vein thrombosis, no underlying anticoagulation history noted, will touch base with patient  - Heme Onc Consulted awaiting recommendations: ordered Coag workup (Protein C and S, Antithrombin III, Lupus Anticoagulant, Cardiolipin) and started on Heparin Drip as recommended by Hep/GI  - patient will go for MRCP/possible ERCP and surgery on board for possible cholecystectomy

## 2017-07-18 NOTE — PROGRESS NOTE ADULT - SUBJECTIVE AND OBJECTIVE BOX
Patient currently not complaining of any abdominal pain, nausea or vomiting.     T(C): 36.8 (07-18-17 @ 15:02), Max: 36.8 (07-18-17 @ 15:02)  HR: 67 (07-18-17 @ 15:02) (67 - 83)  BP: 145/80 (07-18-17 @ 15:02) (121/82 - 152/93)  RR: 18 (07-18-17 @ 15:02) (18 - 18)  SpO2: 96% (07-18-17 @ 15:02) (92% - 96%)  Wt(kg): --  07-18 @ 07:01  -  07-18 @ 16:49  --------------------------------------------------------  IN:    IV PiggyBack: 100 mL  Total IN: 100 mL    OUT:  Total OUT: 0 mL    Total NET: 100 mL      General: NAD, A&Ox3, lying in bed comfortably  Abd: soft, nontender, nondistended, hepatomegaly: liver palpated 2 cm below costal margins                          15.1   7.2   )-----------( 197      ( 18 Jul 2017 07:16 )             47.2     18 Jul 2017 08:40    139    |  99     |  13     ----------------------------<  105    4.1     |  25     |  0.92     Ca    10.0       18 Jul 2017 08:40    TPro  7.6    /  Alb  4.4    /  TBili  8.3    /  DBili  x      /  AST  643    /  ALT  1028   /  AlkPhos  233    18 Jul 2017 08:40    LIVER FUNCTIONS - ( 18 Jul 2017 08:40 )  Alb: 4.4 g/dL / Pro: 7.6 g/dL / ALK PHOS: 233 U/L / ALT: 1028 U/L RC / AST: 643 U/L / GGT: x           PT/INR - ( 18 Jul 2017 13:03 )   PT: 12.1 sec;   INR: 1.11 ratio         PTT - ( 18 Jul 2017 07:16 )  PTT:32.4 sec  CAPILLARY BLOOD GLUCOSE        Abd U/S:  Dilated CBD suggesting choledocolithiasis, with additional   findings concerning for acute cholecystitis    Abd Duplex: Technically limited examination. Echogenic material is seen   within the right and left portal veins, suspicious for at least partial   thrombosis. The main portal vein is not well visualized, but may also be   partially thrombosed. MRV is recommended for further evaluation.    The partially imaged liver demonstrates diffusely increased echogenicity   which may be secondary to hepatic steatosis versus other infiltrative   processes. However, the liver was incompletely evaluated.

## 2017-07-18 NOTE — PROGRESS NOTE ADULT - PROBLEM SELECTOR PLAN 1
- dilated CBD and echo signs of acute heaven and +Alarcon sign  - will check MRCP and GI consulted, can consider ERCP pending MRCP results  - Surgery consulted for possible cholecystectomy   - cover with zosyn for now   - NS at 150cc/hr - dilated CBD and echo signs of acute heaven and +Alarcon sign  - will check MRCP and GI consulted, can consider ERCP pending MRCP results  - Surgery consulted for possible cholecystectomy, but they feel pt is less likely to have cholecystitis and pt's symptoms may all be related to portal vein thrombosis?   - cover with zosyn for now, but possible role in d/c abx if no evidence of cholecystitis   - NS at 150cc/hr

## 2017-07-18 NOTE — PROGRESS NOTE ADULT - PROBLEM SELECTOR PLAN 3
- DVT ppx- low risk, patient ambulating, no pharm ppx needed - Abd doppler U/S showed evidence of partial portal vein thrombosis, no underlying anticoagulation history noted, will touch base with patient  - will touch base with Heme Onc for recommendations in regards to anticoagulation that is needed O/P  - patient will go for MRCP/possible ERCP and surgery on board for possible cholecystectomy - Abd doppler U/S showed evidence of partial portal vein thrombosis, no underlying anticoagulation history noted, will touch base with patient  - Heme Onc Consulted awaiting recommendations: ordered Coag workup (Protein C and S, Antithrombin III, Lupus Anticoagulant, Cardiolipin) and started on Heparin Drip  - patient will go for MRCP/possible ERCP and surgery on board for possible cholecystectomy Patient started on heparin gtt, with plans for changing to NOAC at some point in future; etiology is usually underlying liver disease, but no evidence of cirrhosis/liver disease at this time  -possibility of coagulopathy?

## 2017-07-18 NOTE — PROGRESS NOTE ADULT - SUBJECTIVE AND OBJECTIVE BOX
Patient is a 32y old  Male who presents with a chief complaint of R Epigastric pain (17 Jul 2017 19:44)        SUBJECTIVE / OVERNIGHT EVENTS:      MEDICATIONS  (STANDING):  piperacillin/tazobactam IVPB. 3.375 Gram(s) IV Intermittent every 8 hours  sodium chloride 0.9%. 1000 milliLiter(s) (150 mL/Hr) IV Continuous <Continuous>  heparin  Injectable 5000 Unit(s) SubCutaneous every 8 hours    MEDICATIONS  (PRN):      Vital Signs Last 24 Hrs  T(C): 36.7 (07-18-17 @ 05:12), Max: 36.7 (07-18-17 @ 05:12)  HR: 70 (07-18-17 @ 05:12) (70 - 83)  BP: 129/72 (07-18-17 @ 05:12) (121/82 - 152/93)  RR: 18 (07-18-17 @ 05:12) (16 - 18)  SpO2: 95% (07-18-17 @ 05:12) (92% - 97%)  CAPILLARY BLOOD GLUCOSE        I&O's Summary      PHYSICAL EXAM  GENERAL: NAD, well-developed  HEAD:  Atraumatic, Normocephalic  EYES: EOMI, PERRLA, conjunctiva and sclera clear  NECK: Supple, No JVD  CHEST/LUNG: Clear to auscultation bilaterally; No wheeze  HEART: Regular rate and rhythm; No murmurs, rubs, or gallops  ABDOMEN: Soft, Nontender, Nondistended; Bowel sounds present  EXTREMITIES:  2+ Peripheral Pulses, No clubbing, cyanosis, or edema  PSYCH: AAOx3  SKIN: No rashes or lesions    LABS:                        15.1   7.2   )-----------( 197      ( 18 Jul 2017 07:16 )             47.2     07-18    139  |  99  |  13  ----------------------------<  105<H>  4.1   |  25  |  0.92    Ca    10.0      18 Jul 2017 08:40    TPro  7.6  /  Alb  4.4  /  TBili  8.3<H>  /  DBili  x   /  AST  643<H>  /  ALT  x   /  AlkPhos  233<H>  07-18    PT/INR - ( 18 Jul 2017 07:16 )   PT: 12.0 sec;   INR: 1.11 ratio         PTT - ( 18 Jul 2017 07:16 )  PTT:32.4 sec          RADIOLOGY & ADDITIONAL TESTS:    Imaging Personally Reviewed:  Consultant(s) Notes Reviewed:    Care Discussed with Consultants/Other Providers: Patient is a 32y old  Male who presents with a chief complaint of R Epigastric pain (17 Jul 2017 19:44)    SUBJECTIVE / OVERNIGHT EVENTS:  Patient seen at bedside. Reports some localized RUQ discomfort that is very mild and resolves with lying down. Denies any n/v/f/c denies any diarrhea. Reports poor nutrition since Saturday.     MEDICATIONS  (STANDING):  piperacillin/tazobactam IVPB. 3.375 Gram(s) IV Intermittent every 8 hours  sodium chloride 0.9%. 1000 milliLiter(s) (150 mL/Hr) IV Continuous <Continuous>  heparin  Injectable 5000 Unit(s) SubCutaneous every 8 hours    MEDICATIONS  (PRN):    Vital Signs Last 24 Hrs  T(C): 36.7 (07-18-17 @ 05:12), Max: 36.7 (07-18-17 @ 05:12)  HR: 70 (07-18-17 @ 05:12) (70 - 83)  BP: 129/72 (07-18-17 @ 05:12) (121/82 - 152/93)  RR: 18 (07-18-17 @ 05:12) (16 - 18)  SpO2: 95% (07-18-17 @ 05:12) (92% - 97%)  CAPILLARY BLOOD GLUCOSE    I&O's Summary    PHYSICAL EXAM  GENERAL: NAD, well-developed, young male  HEAD:  Atraumatic, Normocephalic  EYES: EOMI, PERRLA, some yellowing/jaundice of the sclera, bilaterally  CHEST/LUNG: Clear to auscultation bilaterally; No wheeze, rales or rhonchi  HEART: Regular rate and rhythm; No murmurs, rubs, or gallops  ABDOMEN: Soft, bowel sounds present. Nondistended, mild tenderness on RUQ palpation. Positive Alarcon's   EXTREMITIES:  2+ Peripheral Pulses, No clubbing, cyanosis, or edema  PSYCH: AAOx3  SKIN: No of eczematous skin lesions on the belly and extremities    LABS:                        15.1   7.2   )-----------( 197      ( 18 Jul 2017 07:16 )             47.2     07-18    139  |  99  |  13  ----------------------------<  105<H>  4.1   |  25  |  0.92    Ca    10.0      18 Jul 2017 08:40    TPro  7.6  /  Alb  4.4  /  TBili  8.3<H>  /  DBili  x   /  AST  643<H>  /  ALT  1028<H>  /  AlkPhos  233<H>  07-18  U/S:  IMPRESSION:  Dilated CBD suggesting choledocolithiasis, with additional   findings concerning for acute cholecystitis    Abdominal U/S w/ Doppler  Impression: Technically limited examination. Echogenic material is seen   within the right and left portal veins, suspicious for at least partial   thrombosis. The main portal vein is not well visualized, but may also be   partially thrombosed. MRV is recommended for further evaluation.    The partially imaged liver demonstrates diffusely increased echogenicity   which may be secondary to hepatic steatosis versus other infiltrative   processes. However, the liver was incompletely evaluated.    RADIOLOGY & ADDITIONAL TESTS:  MRCP pending

## 2017-07-18 NOTE — CONSULT NOTE ADULT - PROBLEM SELECTOR RECOMMENDATION 9
Unclear etiology at this point. PVT mostly common in patients with underlying liver disease, which this patient doesn't appear to have.  No previous history of clot and no obvious reasons for hypercoagulable state.   Continue with MRCP to assess for underlying liver disease.   Would recommend hypercoagulable work up with protein c and s, factor v leiden, antithrombin and prothrombin gene along with rheumatic work up for APL.  Recommend heme consult for further work up.  Pt will likely need to be started on anticoagulation. Unclear etiology at this point. PVT mostly common in patients with underlying liver disease, which this patient doesn't appear to have.  No previous history of clot and no obvious reasons for hypercoagulable state.   Continue with MRCP to assess for underlying liver disease.   Would recommend hypercoagulable work up with protein c and s, factor v leiden, antithrombin and prothrombin gene along with rheumatic work up for APL.  Recommend heme consult for further work up.  Pt should be started on anticoagulation with heparin gtt and plan to switch with orals. Would look into insurance coverage for DOACs.   Pt will require EGD at some point to assess for varices, but not urgent. Unclear etiology at this point. PVT mostly common in patients with underlying liver disease, which this patient doesn't appear to have.  No previous history of clot and no obvious reasons for hypercoagulable state.   Continue with MRCP to assess for underlying liver disease. would also follow up further liver work up like quantitative HCV rna and f/u autoimmune hepatitis work up.   Would recommend hypercoagulable work up with protein c and s, factor v leiden, antithrombin and prothrombin gene along with rheumatic work up for APL.  Recommend heme consult for further work up.  Trend LFT's and coags to monitor for worsening acute liver failure.   Pt should be started on anticoagulation with heparin gtt and plan to switch with orals. Would look into insurance coverage for DOACs.   Pt will require EGD at some point to assess for varices, but not urgent.  Hepatology will continue to follow.

## 2017-07-18 NOTE — PROGRESS NOTE ADULT - ATTENDING COMMENTS
Possible choledocholithiasis:  - Will need MRCP, possible EUS/ERCP  - If not present, the patient has no clear indication for cholecystectomy  - given absence of symptoms at present, the patient does not appear to have cholecystitis    Portal vein thrombosis:  - agree with MRV  - given need for anticoagulation, even if patient has choledocholithiasis would need to weigh risks/benefits of PROPHYLACTIC cholecystectomy in setting of full AC; would likely defer until 3-6 months from now as his AC would need to be held for 48-72 hrs post-op

## 2017-07-18 NOTE — CONSULT NOTE ADULT - SUBJECTIVE AND OBJECTIVE BOX
HPI:  33 y/o M with hx of eczema presented with RUQ abdominal pain x3 days, nausea and dark urine found to have choledicholelithasis.         In ED,   Vitals T 97.7, HR 73, /92, RR 16, SpO2 97 on RA  Labs- T bili 9.3, Alk phos 243, , ALT 1107  Given Zosyn, 1L NS, Zofran (17 Jul 2017 19:44)    Allergies  No Known Allergies    MEDICATIONS  (STANDING):  piperacillin/tazobactam IVPB. 3.375 Gram(s) IV Intermittent every 8 hours  sodium chloride 0.9%. 1000 milliLiter(s) (150 mL/Hr) IV Continuous <Continuous>  heparin  Infusion.  Unit(s)/Hr (21 mL/Hr) IV Continuous <Continuous>    MEDICATIONS  (PRN):  heparin  Injectable 9500 Unit(s) IV Push every 6 hours PRN For aPTT less than 40  heparin  Injectable 4500 Unit(s) IV Push every 6 hours PRN For aPTT between 40 - 57      PAST MEDICAL & SURGICAL HISTORY:  Eczema, unspecified type  No significant past surgical history      FAMILY HISTORY:  Family history of gallbladder disease (Father, Grandparent)  Family history of coronary artery disease in mother (Mother)      SOCIAL HISTORY: No EtOH, no tobacco    REVIEW OF SYSTEMS:    CONSTITUTIONAL: No weakness, fevers or chills  EYES/ENT: No visual changes;  No vertigo or throat pain   NECK: No pain or stiffness  RESPIRATORY: No cough, wheezing, hemoptysis; No shortness of breath  CARDIOVASCULAR: No chest pain or palpitations  GASTROINTESTINAL: No abdominal or epigastric pain. No nausea, vomiting, or hematemesis; No diarrhea or constipation. No melena or hematochezia.  GENITOURINARY: No dysuria, frequency or hematuria  NEUROLOGICAL: No numbness or weakness  SKIN: No itching, burning, rashes, or lesions   All other review of systems is negative unless indicated above.    Height (cm): 170.18 (07-18 @ 05:12)  Weight (kg): 117.9 (07-18 @ 05:12)  BMI (kg/m2): 40.7 (07-18 @ 05:12)  BSA (m2): 2.26 (07-18 @ 05:12)    T(F): 98.1 (07-18-17 @ 05:12), Max: 98.1 (07-18-17 @ 05:12)  HR: 70 (07-18-17 @ 05:12)  BP: 129/72 (07-18-17 @ 05:12)  RR: 18 (07-18-17 @ 05:12)  SpO2: 95% (07-18-17 @ 05:12)  Wt(kg): --    GENERAL: NAD, well-developed  HEAD:  Atraumatic, Normocephalic  EYES: EOMI, PERRLA, conjunctiva and sclera clear  NECK: Supple, No JVD  CHEST/LUNG: Clear to auscultation bilaterally; No wheeze  HEART: Regular rate and rhythm; No murmurs, rubs, or gallops  ABDOMEN: Soft, Nontender, Nondistended; Bowel sounds present  EXTREMITIES:  2+ Peripheral Pulses, No clubbing, cyanosis, or edema  NEUROLOGY: non-focal  SKIN: No rashes or lesions                          15.1   7.2   )-----------( 197      ( 18 Jul 2017 07:16 )             47.2       07-18    139  |  99  |  13  ----------------------------<  105<H>  4.1   |  25  |  0.92    Ca    10.0      18 Jul 2017 08:40    TPro  7.6  /  Alb  4.4  /  TBili  8.3<H>  /  DBili  x   /  AST  643<H>  /  ALT  1028<H>  /  AlkPhos  233<H>  07-18 HPI:  33 y/o M with hx of eczema presented with RUQ abdominal pain x3 days, nausea and dark urine found to choledocholithiasis, dilated CBD, also findings concerning for acute cholecystitis, MRCP          In ED,   Vitals T 97.7, HR 73, /92, RR 16, SpO2 97 on RA  Labs- T bili 9.3, Alk phos 243, , ALT 1107  Given Zosyn, 1L NS, Zofran (17 Jul 2017 19:44)    Allergies  No Known Allergies    MEDICATIONS  (STANDING):  piperacillin/tazobactam IVPB. 3.375 Gram(s) IV Intermittent every 8 hours  sodium chloride 0.9%. 1000 milliLiter(s) (150 mL/Hr) IV Continuous <Continuous>  heparin  Infusion.  Unit(s)/Hr (21 mL/Hr) IV Continuous <Continuous>    MEDICATIONS  (PRN):  heparin  Injectable 9500 Unit(s) IV Push every 6 hours PRN For aPTT less than 40  heparin  Injectable 4500 Unit(s) IV Push every 6 hours PRN For aPTT between 40 - 57      PAST MEDICAL & SURGICAL HISTORY:  Eczema, unspecified type  No significant past surgical history      FAMILY HISTORY:  Family history of gallbladder disease (Father, Grandparent)  Family history of coronary artery disease in mother (Mother)      SOCIAL HISTORY: No EtOH, no tobacco    REVIEW OF SYSTEMS:    CONSTITUTIONAL: No weakness, fevers or chills  EYES/ENT: No visual changes;  No vertigo or throat pain   NECK: No pain or stiffness  RESPIRATORY: No cough, wheezing, hemoptysis; No shortness of breath  CARDIOVASCULAR: No chest pain or palpitations  GASTROINTESTINAL: No abdominal or epigastric pain. No nausea, vomiting, or hematemesis; No diarrhea or constipation. No melena or hematochezia.  GENITOURINARY: No dysuria, frequency or hematuria  NEUROLOGICAL: No numbness or weakness  SKIN: No itching, burning, rashes, or lesions   All other review of systems is negative unless indicated above.    Height (cm): 170.18 (07-18 @ 05:12)  Weight (kg): 117.9 (07-18 @ 05:12)  BMI (kg/m2): 40.7 (07-18 @ 05:12)  BSA (m2): 2.26 (07-18 @ 05:12)    T(F): 98.1 (07-18-17 @ 05:12), Max: 98.1 (07-18-17 @ 05:12)  HR: 70 (07-18-17 @ 05:12)  BP: 129/72 (07-18-17 @ 05:12)  RR: 18 (07-18-17 @ 05:12)  SpO2: 95% (07-18-17 @ 05:12)  Wt(kg): --    GENERAL: NAD, well-developed  HEAD:  Atraumatic, Normocephalic  EYES: EOMI, PERRLA, conjunctiva and sclera clear  NECK: Supple, No JVD  CHEST/LUNG: Clear to auscultation bilaterally; No wheeze  HEART: Regular rate and rhythm; No murmurs, rubs, or gallops  ABDOMEN: Soft, Nontender, Nondistended; Bowel sounds present  EXTREMITIES:  2+ Peripheral Pulses, No clubbing, cyanosis, or edema  NEUROLOGY: non-focal  SKIN: No rashes or lesions                          15.1   7.2   )-----------( 197      ( 18 Jul 2017 07:16 )             47.2       07-18    139  |  99  |  13  ----------------------------<  105<H>  4.1   |  25  |  0.92    Ca    10.0      18 Jul 2017 08:40    TPro  7.6  /  Alb  4.4  /  TBili  8.3<H>  /  DBili  x   /  AST  643<H>  /  ALT  1028<H>  /  AlkPhos  233<H>  07-18 HPI:  31 y/o M with hx of eczema on topicals presented with RUQ abdominal pain x3 days, nausea and dark urine found to choledocholithiasis, dilated CBD, also findings concerning for acute cholecystitis, MRCP pending.      Hematology consulted for anticoagulation management of suspected portal vein thrombosis on Abd US with dopplers.  There is concern for hypercoagulability in this patient given no h/o cirrhosis, malignancy, family history of blood clots, recent travel or recent illness.     Patient reports improvement in his RUQ abdominal pain.  Continues to have dark urine.  No BM for 2 days. No increase in abdominal girth, LE swelling.  Denies nausea, vomiting, fevers, chills, diarrhea.    Allergies  No Known Allergies    MEDICATIONS  (STANDING):  piperacillin/tazobactam IVPB. 3.375 Gram(s) IV Intermittent every 8 hours  sodium chloride 0.9%. 1000 milliLiter(s) (150 mL/Hr) IV Continuous <Continuous>  heparin  Infusion.  Unit(s)/Hr (21 mL/Hr) IV Continuous <Continuous>    MEDICATIONS  (PRN):  heparin  Injectable 9500 Unit(s) IV Push every 6 hours PRN For aPTT less than 40  heparin  Injectable 4500 Unit(s) IV Push every 6 hours PRN For aPTT between 40 - 57    PAST MEDICAL & SURGICAL HISTORY:  Eczema, unspecified type  No significant past surgical history    FAMILY HISTORY:  Family history of gallbladder disease (Father, Grandparent)  Family history of coronary artery disease in mother (Mother)  Family history of breast cancer (Paternal aunt)    SOCIAL HISTORY: social drinker, no tobacco    REVIEW OF SYSTEMS:  All other review of systems is negative unless indicated above.    VITALS:  Height (cm): 170.18 (07-18 @ 05:12)  Weight (kg): 117.9 (07-18 @ 05:12)  BMI (kg/m2): 40.7 (07-18 @ 05:12)  BSA (m2): 2.26 (07-18 @ 05:12)    T(F): 98.1 (07-18-17 @ 05:12), Max: 98.1 (07-18-17 @ 05:12)  HR: 70 (07-18-17 @ 05:12)  BP: 129/72 (07-18-17 @ 05:12)  RR: 18 (07-18-17 @ 05:12)  SpO2: 95% (07-18-17 @ 05:12)  Wt(kg): --    PHYSICAL EXAM:  GENERAL: NAD, well-developed  HEENT: PERRL, EOMI, MMM   CHEST/LUNG: Clear to auscultation bilaterally; No wheeze  HEART: Regular rate and rhythm; No murmurs, rubs, or gallops  ABDOMEN: Soft, mild RUQ tenderness, nondistended; bs, no guarding or rigidity  EXTREMITIES:  no LE edema  NEUROLOGY: non-focal  SKIN: +eczema rash on abdominal       LABS:                       15.1   7.2   )-----------( 197      ( 18 Jul 2017 07:16 )             47.2       07-18    139  |  99  |  13  ----------------------------<  105<H>  4.1   |  25  |  0.92    Ca    10.0      18 Jul 2017 08:40    TPro  7.6  /  Alb  4.4  /  TBili  8.3<H>  /  DBili  x   /  AST  643<H>  /  ALT  1028<H>  /  AlkPhos  233<H>  07-18    US Abdomen Doppler:   Echogenic material is seen within the right and left portal veins, suspicious for at least partial thrombosis. The main portal vein is not well visualized, but may also be partially thrombosed. MRV is recommended for further evaluation. The partially imaged liver demonstrates diffusely increased echogenicity which may be secondary to hepatic steatosis versus other infiltrative processes. However, the liver was incompletely evaluated.

## 2017-07-18 NOTE — PROGRESS NOTE ADULT - ASSESSMENT
32 year old man with elevated T. bili, AST, ALT, normal WBC and Abd Duplex showing possible portal vein thrombosis  Plan:  -Given normal WBC, no abdominal pain or tenderness, acute cholecystitis unlikely, T. bili and liver enzymes could be explained by portal vein thrombus  -f/u MRV to evaluate portal vein thrombosis  -f/u GI/Hepatology  -No surgical intervention on this admission  -patient seen and discussed with Dr. Trevizo, discussed recommendations with primary team  #[id number]

## 2017-07-18 NOTE — CONSULT NOTE ADULT - ASSESSMENT
31 yo M hx eczema presents with acute RUQ pain and transaminitis found to have likely portal vein thrombosis on abdominal duplex.
33 y/o M with hx of eczema on topicals presented with RUQ abdominal pain x3 days, nausea and dark urine found to choledocholithiasis, dilated CBD, also findings concerning for acute cholecystitis.  Hematology consulted for anticoagulation management of suspected portal vein thrombosis found on Abd US with dopplers and recommendations for hypercoagulability workup given the patient has no h/o cirrhosis, malignancy, family history of blood clots, recent travel or recent illness.

## 2017-07-18 NOTE — CONSULT NOTE ADULT - SUBJECTIVE AND OBJECTIVE BOX
Chief Complaint:  Patient is a 32y old  Male who presents with a chief complaint of R Epigastric pain (17 Jul 2017 19:44)      HPI:    Allergies:  No Known Allergies      Home Medications:    Hospital Medications:  piperacillin/tazobactam IVPB. 3.375 Gram(s) IV Intermittent every 8 hours  sodium chloride 0.9%. 1000 milliLiter(s) IV Continuous <Continuous>  heparin  Injectable 5000 Unit(s) SubCutaneous every 8 hours      PMHX/PSHX:  Eczema, unspecified type  No pertinent past medical history  No significant past surgical history      Family history:  Family history of gallbladder disease (Father, Grandparent)  Family history of coronary artery disease in mother (Mother)      Social History:     ROS:     General:  No wt loss, fevers, chills, night sweats, fatigue,   Eyes:  Good vision, no reported pain  ENT:  No sore throat, pain, runny nose, dysphagia  CV:  No pain, palpitations, hypo/hypertension  Resp:  No dyspnea, cough, tachypnea, wheezing  GI:  See HPI  :  No pain, bleeding, incontinence, nocturia  Muscle:  No pain, weakness  Neuro:  No weakness, tingling, memory problems  Psych:  No fatigue, insomnia, mood problems, depression  Endocrine:  No polyuria, polydipsia, cold/heat intolerance  Heme:  No petechiae, ecchymosis, easy bruisability  Skin:  No rash, edema      PHYSICAL EXAM:     GENERAL:  Appears stated age, well-groomed, well-nourished, no distress  HEENT:  NC/AT,  conjunctivae clear and pink,  no JVD  CHEST:  Full & symmetric excursion, no increased effort, breath sounds clear  HEART:  Regular rhythm, S1, S2, no murmur/rub/S3/S4, no abdominal bruit, no edema  ABDOMEN:  Soft, non-tender, non-distended, normoactive bowel sounds,  no masses ,  EXTREMITIES:  no cyanosis,clubbing or edema  SKIN:  No rash/erythema/ecchymoses/petechiae/wounds/abscess/warm/dry  NEURO:  Alert, oriented    Vital Signs:  Vital Signs Last 24 Hrs  T(C): 36.7 (18 Jul 2017 05:12), Max: 36.7 (18 Jul 2017 05:12)  T(F): 98.1 (18 Jul 2017 05:12), Max: 98.1 (18 Jul 2017 05:12)  HR: 70 (18 Jul 2017 05:12) (70 - 83)  BP: 129/72 (18 Jul 2017 05:12) (121/82 - 152/93)  BP(mean): --  RR: 18 (18 Jul 2017 05:12) (16 - 18)  SpO2: 95% (18 Jul 2017 05:12) (92% - 97%)  Daily Height in cm: 170.18 (18 Jul 2017 05:12)    Daily     LABS:                        15.1   7.2   )-----------( 197      ( 18 Jul 2017 07:16 )             47.2     07-18    139  |  99  |  13  ----------------------------<  105<H>  4.1   |  25  |  0.92    Ca    10.0      18 Jul 2017 08:40    TPro  7.6  /  Alb  4.4  /  TBili  8.3<H>  /  DBili  x   /  AST  643<H>  /  ALT  1028<H>  /  AlkPhos  233<H>  07-18    LIVER FUNCTIONS - ( 18 Jul 2017 08:40 )  Alb: 4.4 g/dL / Pro: 7.6 g/dL / ALK PHOS: 233 U/L / ALT: 1028 U/L RC / AST: 643 U/L / GGT: x           PT/INR - ( 18 Jul 2017 07:16 )   PT: 12.0 sec;   INR: 1.11 ratio         PTT - ( 18 Jul 2017 07:16 )  PTT:32.4 sec    Amylase Serum--      Lipase serum44       Ammonia--      Imaging: Chief Complaint:  Patient is a 32y old  Male who presents with a chief complaint of R Epigastric pain (17 Jul 2017 19:44)      HPI:  31 yo M hx eczema presents with complaint of RUQ pain for 3 days. Pt states he was getting hair cut when developed sudden, severe pain in his RUQ. He layed down for a while which helped to reduced the pain. Since then he has had constant pain in his RUQ associated with some abdominal bloating. He also noticed that his urine started turning dark and pain continued which prompted him to come to the hospital.  Patient denies associated nausea, vomiting or diarrhea. Eating does no exacerbate pain. Patient denies smoking and only drinks occasionally. Patient's only medications are topical ointments for eczema, but takes no oral medications. He denies taking any other dietary supplements or herbals, besides whey protein. Pt denies any family history of liver disease, blood clots or hematological disorders. Pt states that he used topical anabolic steroids in 2014, but hasn't in a while.     Allergies:  No Known Allergies      Home Medications:    Hospital Medications:  piperacillin/tazobactam IVPB. 3.375 Gram(s) IV Intermittent every 8 hours  sodium chloride 0.9%. 1000 milliLiter(s) IV Continuous <Continuous>  heparin  Injectable 5000 Unit(s) SubCutaneous every 8 hours      PMHX/PSHX:  Eczema, unspecified type  No pertinent past medical history  No significant past surgical history      Family history:  Family history of gallbladder disease (Father, Grandparent)  Family history of coronary artery disease in mother (Mother)      Social History: Social drinker. Denies smoking or drug use.     ROS:     General:  No wt loss, fevers, chills, night sweats, fatigue,   Eyes:  Good vision, no reported pain  ENT:  No sore throat, pain, runny nose, dysphagia  CV:  No pain, palpitations, hypo/hypertension  Resp:  No dyspnea, cough, tachypnea, wheezing  GI:  See HPI  :  No pain, bleeding, incontinence, nocturia  Muscle:  No pain, weakness  Neuro:  No weakness, tingling, memory problems  Psych:  No fatigue, insomnia, mood problems, depression  Endocrine:  No polyuria, polydipsia, cold/heat intolerance  Heme:  No petechiae, ecchymosis, easy bruisability, clots.   Skin: +eczema  No rash, edema      PHYSICAL EXAM:     GENERAL:  Appears stated age, well-groomed, well-nourished, no distress  HEENT:  NC/AT,  no JVD, mild scleral icterus.   CHEST:  Full & symmetric excursion, no increased effort, breath sounds clear  HEART:  Regular rhythm, S1, S2, no murmur/rub/S3/S4, no abdominal bruit, no edema  ABDOMEN:  Mild RUQ TTP, but soft,  normoactive bowel sounds,  no masses ,  EXTREMITIES:  no cyanosis,clubbing or edema  SKIN:  Eczema on abdomen   NEURO:  Alert, oriented    Vital Signs:  Vital Signs Last 24 Hrs  T(C): 36.7 (18 Jul 2017 05:12), Max: 36.7 (18 Jul 2017 05:12)  T(F): 98.1 (18 Jul 2017 05:12), Max: 98.1 (18 Jul 2017 05:12)  HR: 70 (18 Jul 2017 05:12) (70 - 83)  BP: 129/72 (18 Jul 2017 05:12) (121/82 - 152/93)  BP(mean): --  RR: 18 (18 Jul 2017 05:12) (16 - 18)  SpO2: 95% (18 Jul 2017 05:12) (92% - 97%)  Daily Height in cm: 170.18 (18 Jul 2017 05:12)    Daily     LABS:                        15.1   7.2   )-----------( 197      ( 18 Jul 2017 07:16 )             47.2     07-18    139  |  99  |  13  ----------------------------<  105<H>  4.1   |  25  |  0.92    Ca    10.0      18 Jul 2017 08:40    TPro  7.6  /  Alb  4.4  /  TBili  8.3<H>  /  DBili  x   /  AST  643<H>  /  ALT  1028<H>  /  AlkPhos  233<H>  07-18    LIVER FUNCTIONS - ( 18 Jul 2017 08:40 )  Alb: 4.4 g/dL / Pro: 7.6 g/dL / ALK PHOS: 233 U/L / ALT: 1028 U/L RC / AST: 643 U/L / GGT: x           PT/INR - ( 18 Jul 2017 07:16 )   PT: 12.0 sec;   INR: 1.11 ratio         PTT - ( 18 Jul 2017 07:16 )  PTT:32.4 sec    Amylase Serum--      Lipase serum44       Ammonia--      Imaging:    US Duplex Abdomen  < from: US Abdomen Doppler (07.18.17 @ 10:42) >  Findings: Study is extremely limited secondary to patient's body habitus.   The partially imaged liver appears diffusely increased in echogenicity   and heterogeneous in echotexture.    The hepatic artery is patent with peak systolic velocity of 86.5 cm/s.   The main portal vein is difficult to image, but appears at least   partially patent. Echogenic material is noted within the right and left   portal veins, likely secondary to at least partial thrombosis. The middle   and left hepatic veins and inferior vena cava are patent. The right   hepatic vein was difficult to visualize. The splenic vein is patent in   the splenic hilum. In themidline turbulent flow is seen within the   splenic vein.    Impression: Technically limited examination. Echogenic material is seen   within the right and left portal veins, suspicious for at least partial   thrombosis. The main portal vein is not well visualized, but may also be   partially thrombosed. MRV is recommended for further evaluation.    The partially imaged liver demonstrates diffusely increased echogenicity   which may be secondary to hepatic steatosis versus other infiltrative   processes. However, the liver was incompletely evaluated.    < end of copied text >

## 2017-07-18 NOTE — PROGRESS NOTE ADULT - PROBLEM SELECTOR PLAN 2
- elevated Alk Phos, AST and ALT  - checking Hep A, B and C, ERMIAS, smooth muscle antibody, EBV, CMV and microsomal antibody assay to r/o autoimmune or infectious cause - elevated Alk Phos, AST and ALT - trending down  - Hep panel unremarkable, CMV IgG positive, other work up pending (ERMIAS, smooth muscle, microsomal) - elevated Alk Phos, AST and ALT - trending down  - Hep panel unremarkable, CMV IgG positive, but IgM negative, other work up pending (ERMIAS, smooth muscle, microsomal)

## 2017-07-18 NOTE — CONSULT NOTE ADULT - ATTENDING COMMENTS
seen and examined at bedside in ER    Epigastric pain x 3 days, initially had vomiting. Had similar episode 1 month ago.  jaundice  Bili = 9  ALT = 1100  RUQ U/S - gallstones w/ 7mm CBD    Likely recurrent CBD stones, but transaminases not usually so elevated with this disease.  Would send viral hepatitis labs and consult GI.
A 32 year-old man with history of eczema is being seen for acute RUQ pain, acute hepatitis and portal vein thrombosis.   Patient denies personal or family history of hypercoagulable state.   Patient develped sudden onset of RUQ pain associated with abdominal bloating. He denies vomiting but has dark colored urine. He denies food intolerance. He denies excessive alcohol drinking, use of over the counter medications, or herbal products, or supplements.  , ALT 1028, , TB 8.3. Drug screen was negative. Acute HAV, HBV infection was ruled out. Anti-HCV Ab negative.   Abdominal Doppler reported l, Bimtited study but right and left portal vein thrombosis.   Patient has acute hepatitis of unclear etiology, He has normal INR and has no hepatic encephalopathy.  Will recommend workup to rule out acute HEV, autoimmune hepatitis and HCV RNA., trend liver tests and INR.   He may have acute hepatic injury from acute PVT.   Will recommend workup for thrombophilic state, and start anticoagulation and watch for bleeding.
Reviewed pt's RUQ sono suspicious for partial thrombosis. He will be getting MRV to confirm finding. Currently, pt has no FH or personal history of blood clot or sudden death. Consulted regarding likelihood of hypercoagulable state with portal vein thrombosis without any clear cause like liver cirrhosis. He has no signs or symptoms of malignancy. Currently PC, PS, ATIII, antiphospholipid Ab has been ordered by team as preliminary evaluation. Generally in the setting of acute thrombosis, the antiphospholipid ab will not be affected but the other levels may potentially be affected. If they are low, they would have to be later repeated as an outpatient when acute event is resolved. If PVT confirmed, would consider widening work up to less likely causes like FV Leiden, PT gene, PNH.

## 2017-07-18 NOTE — CONSULT NOTE ADULT - PROBLEM SELECTOR RECOMMENDATION 9
Please order MRV for further evaluation  Can continue heparin drip or FD Lovenox BID given normal renal function  Will need to transition to PO Lov Please order MRV for further evaluation  Can continue the heparin drip since he is already on it  Will consider hypercoagulable workup if MRV positive for thrombosis    Hematology will continue to follow.

## 2017-07-19 LAB
ALBUMIN SERPL ELPH-MCNC: 4 G/DL — SIGNIFICANT CHANGE UP (ref 3.3–5)
ALP SERPL-CCNC: 251 U/L — HIGH (ref 40–120)
ALT FLD-CCNC: 1109 U/L RC — HIGH (ref 10–45)
ANA TITR SER: NEGATIVE — SIGNIFICANT CHANGE UP
ANION GAP SERPL CALC-SCNC: 13 MMOL/L — SIGNIFICANT CHANGE UP (ref 5–17)
APTT BLD: 80.2 SEC — HIGH (ref 27.5–37.4)
AST SERPL-CCNC: 579 U/L — HIGH (ref 10–40)
AT III ACT/NOR PPP CHRO: 139 % — HIGH (ref 85–135)
BILIRUB SERPL-MCNC: 7.5 MG/DL — HIGH (ref 0.2–1.2)
BUN SERPL-MCNC: 11 MG/DL — SIGNIFICANT CHANGE UP (ref 7–23)
CALCIUM SERPL-MCNC: 9.6 MG/DL — SIGNIFICANT CHANGE UP (ref 8.4–10.5)
CHLORIDE SERPL-SCNC: 99 MMOL/L — SIGNIFICANT CHANGE UP (ref 96–108)
CO2 SERPL-SCNC: 25 MMOL/L — SIGNIFICANT CHANGE UP (ref 22–31)
CREAT SERPL-MCNC: 0.89 MG/DL — SIGNIFICANT CHANGE UP (ref 0.5–1.3)
DRVVT SCREEN TO CONFIRM RATIO: SIGNIFICANT CHANGE UP
EBV EA AB SER IA-ACNC: <5 U/ML — SIGNIFICANT CHANGE UP
EBV EA AB TITR SER IF: POSITIVE
EBV EA IGG SER-ACNC: NEGATIVE — SIGNIFICANT CHANGE UP
EBV NA IGG SER IA-ACNC: 428 U/ML — HIGH
EBV PATRN SPEC IB-IMP: SIGNIFICANT CHANGE UP
EBV VCA IGG AVIDITY SER QL IA: NEGATIVE — SIGNIFICANT CHANGE UP
EBV VCA IGM SER IA-ACNC: <10 U/ML — SIGNIFICANT CHANGE UP
EBV VCA IGM SER IA-ACNC: <10 U/ML — SIGNIFICANT CHANGE UP
EBV VCA IGM TITR FLD: NEGATIVE — SIGNIFICANT CHANGE UP
GLUCOSE SERPL-MCNC: 93 MG/DL — SIGNIFICANT CHANGE UP (ref 70–99)
HCT VFR BLD CALC: 44.8 % — SIGNIFICANT CHANGE UP (ref 39–50)
HCT VFR BLD CALC: 45.1 % — SIGNIFICANT CHANGE UP (ref 39–50)
HGB BLD-MCNC: 15.3 G/DL — SIGNIFICANT CHANGE UP (ref 13–17)
HGB BLD-MCNC: 15.3 G/DL — SIGNIFICANT CHANGE UP (ref 13–17)
INR BLD: 1.11 RATIO — SIGNIFICANT CHANGE UP (ref 0.88–1.16)
LA NT DPL PPP QL: 28.9 SEC — SIGNIFICANT CHANGE UP
LKM AB SER-ACNC: 0.5 UNITS — SIGNIFICANT CHANGE UP (ref 0–20)
MAGNESIUM SERPL-MCNC: 2.1 MG/DL — SIGNIFICANT CHANGE UP (ref 1.6–2.6)
MCHC RBC-ENTMCNC: 30.4 PG — SIGNIFICANT CHANGE UP (ref 27–34)
MCHC RBC-ENTMCNC: 31 PG — SIGNIFICANT CHANGE UP (ref 27–34)
MCHC RBC-ENTMCNC: 33.8 GM/DL — SIGNIFICANT CHANGE UP (ref 32–36)
MCHC RBC-ENTMCNC: 34.2 GM/DL — SIGNIFICANT CHANGE UP (ref 32–36)
MCV RBC AUTO: 89.9 FL — SIGNIFICANT CHANGE UP (ref 80–100)
MCV RBC AUTO: 90.8 FL — SIGNIFICANT CHANGE UP (ref 80–100)
NORMALIZED SCT PPP-RTO: 1.18 RATIO — HIGH (ref 0–1.16)
NORMALIZED SCT PPP-RTO: ABNORMAL
PHOSPHATE SERPL-MCNC: 3 MG/DL — SIGNIFICANT CHANGE UP (ref 2.5–4.5)
PLATELET # BLD AUTO: 198 K/UL — SIGNIFICANT CHANGE UP (ref 150–400)
PLATELET # BLD AUTO: 199 K/UL — SIGNIFICANT CHANGE UP (ref 150–400)
POTASSIUM SERPL-MCNC: 3.9 MMOL/L — SIGNIFICANT CHANGE UP (ref 3.5–5.3)
POTASSIUM SERPL-SCNC: 3.9 MMOL/L — SIGNIFICANT CHANGE UP (ref 3.5–5.3)
PROT C ACT/NOR PPP: 149 % — SIGNIFICANT CHANGE UP (ref 74–150)
PROT S FREE AG PPP IA-ACNC: 150 % — HIGH (ref 67–141)
PROT SERPL-MCNC: 7.6 G/DL — SIGNIFICANT CHANGE UP (ref 6–8.3)
PROTHROM AB SERPL-ACNC: 12.1 SEC — SIGNIFICANT CHANGE UP (ref 9.8–12.7)
RBC # BLD: 4.94 M/UL — SIGNIFICANT CHANGE UP (ref 4.2–5.8)
RBC # BLD: 5.01 M/UL — SIGNIFICANT CHANGE UP (ref 4.2–5.8)
RBC # FLD: 12 % — SIGNIFICANT CHANGE UP (ref 10.3–14.5)
RBC # FLD: 12.2 % — SIGNIFICANT CHANGE UP (ref 10.3–14.5)
SODIUM SERPL-SCNC: 137 MMOL/L — SIGNIFICANT CHANGE UP (ref 135–145)
WBC # BLD: 7 K/UL — SIGNIFICANT CHANGE UP (ref 3.8–10.5)
WBC # BLD: 7.3 K/UL — SIGNIFICANT CHANGE UP (ref 3.8–10.5)
WBC # FLD AUTO: 7 K/UL — SIGNIFICANT CHANGE UP (ref 3.8–10.5)
WBC # FLD AUTO: 7.3 K/UL — SIGNIFICANT CHANGE UP (ref 3.8–10.5)

## 2017-07-19 PROCEDURE — 99232 SBSQ HOSP IP/OBS MODERATE 35: CPT | Mod: GC

## 2017-07-19 PROCEDURE — 99232 SBSQ HOSP IP/OBS MODERATE 35: CPT

## 2017-07-19 RX ADMIN — SODIUM CHLORIDE 75 MILLILITER(S): 9 INJECTION INTRAMUSCULAR; INTRAVENOUS; SUBCUTANEOUS at 10:43

## 2017-07-19 RX ADMIN — HEPARIN SODIUM 2100 UNIT(S)/HR: 5000 INJECTION INTRAVENOUS; SUBCUTANEOUS at 09:06

## 2017-07-19 RX ADMIN — HEPARIN SODIUM 2100 UNIT(S)/HR: 5000 INJECTION INTRAVENOUS; SUBCUTANEOUS at 02:01

## 2017-07-19 RX ADMIN — SODIUM CHLORIDE 150 MILLILITER(S): 9 INJECTION INTRAMUSCULAR; INTRAVENOUS; SUBCUTANEOUS at 06:08

## 2017-07-19 RX ADMIN — SODIUM CHLORIDE 150 MILLILITER(S): 9 INJECTION INTRAMUSCULAR; INTRAVENOUS; SUBCUTANEOUS at 09:07

## 2017-07-19 NOTE — PROGRESS NOTE ADULT - ATTENDING COMMENTS
Patient was seen and examined at rounds.  A 32 year-old man with acute RUQ pain is being seen for marked elevation of liver enzymes and jaundice suggestive of acute hepatitis.  Initial abdominal Doppler reported limited study with portal vein thrombosis.   MRCP and MRI reviewed with radiologist.  MRI and MRCP reported hepatic steatosis, dilated R intrahepatic duct, choledocholithiasis vs narrowing of distal CBD.   NO PORTAL VEIN THROMBOSIS was noted.  Abdominal pain has iilimproved.  Will recommend- discontinue anticoagulation, trend liver tests, await workup to rule out acute hepatitis, rule out cholangiopathy and biliary consult for abnormal CBD, and IHDD.

## 2017-07-19 NOTE — PROGRESS NOTE ADULT - PROBLEM SELECTOR PLAN 2
- elevated Alk Phos, AST and ALT - trending down  - Hep panel unremarkable, CMV IgG positive, but IgM negative, other work up pending (ERMIAS, smooth muscle, microsomal)

## 2017-07-19 NOTE — PROGRESS NOTE ADULT - SUBJECTIVE AND OBJECTIVE BOX
Patient currently not complaining of any abdominal pain, nausea or vomiting. Happy to hear about not having clot. Awaiting ERCP tomorrow.    Vital Signs Last 24 Hrs  T(C): 37.2 (19 Jul 2017 14:17), Max: 37.2 (19 Jul 2017 14:17)  T(F): 99 (19 Jul 2017 14:17), Max: 99 (19 Jul 2017 14:17)  HR: 60 (19 Jul 2017 14:17) (60 - 75)  BP: 128/76 (19 Jul 2017 14:17) (128/76 - 146/77)  BP(mean): --  RR: 17 (19 Jul 2017 14:17) (16 - 18)  SpO2: 96% (19 Jul 2017 14:17) (95% - 97%)    General: NAD, A&Ox3, lying in bed comfortably  Abd: soft, nontender, nondistended, hepatomegaly: liver palpated 2 cm below costal margins                          15.3   7.0   )-----------( 198      ( 19 Jul 2017 07:48 )             44.8     07-19    137  |  99  |  11  ----------------------------<  93  3.9   |  25  |  0.89    Ca    9.6      19 Jul 2017 07:48  Phos  3.0     07-19  Mg     2.1     07-19    TPro  7.6  /  Alb  4.0  /  TBili  7.5<H>  /  DBili  x   /  AST  579<H>  /  ALT  1109<H>  /  AlkPhos  251<H>  07-19    MRI read and images personally viewed.

## 2017-07-19 NOTE — PROGRESS NOTE ADULT - ASSESSMENT
33 yo M hx eczema presents with acute RUQ pain and transaminitis found to have obstruction of common bile duct on MRCP. No evidence of portal vein thrombosis as previously reported on ultrasound.

## 2017-07-19 NOTE — PROGRESS NOTE ADULT - PROBLEM SELECTOR PLAN 4
- Abd doppler U/S showed evidence of partial portal vein thrombosis, no underlying anticoagulation history noted, will touch base with patient  - Heme Onc Consulted awaiting recommendations: ordered Coag workup (Protein C and S, Antithrombin III, Lupus Anticoagulant, Cardiolipin) and started on Heparin Drip as recommended by Hep/GI  - patient will go for MRCP/possible ERCP and surgery on board for possible cholecystectomy - no evidence of thrombosis on MRI imaging, heme onc signed out  - heparin discontinued - no evidence of thrombosis on MRI imaging, heparin discontinued  - hypercoagulable workup with mildly abnormal lab values, will f/u with heme

## 2017-07-19 NOTE — PROGRESS NOTE ADULT - SUBJECTIVE AND OBJECTIVE BOX
Patient is a 32y old  Male who presents with a chief complaint of R Epigastric pain (17 Jul 2017 19:44)        SUBJECTIVE / OVERNIGHT EVENTS:      MEDICATIONS  (STANDING):  sodium chloride 0.9%. 1000 milliLiter(s) (150 mL/Hr) IV Continuous <Continuous>  heparin  Infusion.  Unit(s)/Hr (21 mL/Hr) IV Continuous <Continuous>    MEDICATIONS  (PRN):  heparin  Injectable 9500 Unit(s) IV Push every 6 hours PRN For aPTT less than 40  heparin  Injectable 4500 Unit(s) IV Push every 6 hours PRN For aPTT between 40 - 57      Vital Signs Last 24 Hrs  T(C): 36.5 (07-19-17 @ 05:36), Max: 36.8 (07-18-17 @ 15:02)  HR: 65 (07-19-17 @ 05:36) (65 - 75)  BP: 133/86 (07-19-17 @ 05:36) (133/86 - 146/77)  RR: 18 (07-19-17 @ 05:36) (16 - 18)  SpO2: 95% (07-19-17 @ 05:36) (95% - 97%)  CAPILLARY BLOOD GLUCOSE        I&O's Summary    18 Jul 2017 07:01  -  19 Jul 2017 06:57  --------------------------------------------------------  IN: 3736.8 mL / OUT: 0 mL / NET: 3736.8 mL        PHYSICAL EXAM  GENERAL: NAD, well-developed  HEAD:  Atraumatic, Normocephalic  EYES: EOMI, PERRLA, conjunctiva and sclera clear  NECK: Supple, No JVD  CHEST/LUNG: Clear to auscultation bilaterally; No wheeze  HEART: Regular rate and rhythm; No murmurs, rubs, or gallops  ABDOMEN: Soft, Nontender, Nondistended; Bowel sounds present  EXTREMITIES:  2+ Peripheral Pulses, No clubbing, cyanosis, or edema  PSYCH: AAOx3  SKIN: No rashes or lesions    LABS:                        15.3   7.3   )-----------( 199      ( 19 Jul 2017 01:17 )             45.1     07-18    139  |  99  |  13  ----------------------------<  105<H>  4.1   |  25  |  0.92    Ca    10.0      18 Jul 2017 08:40    TPro  7.6  /  Alb  4.4  /  TBili  8.3<H>  /  DBili  x   /  AST  643<H>  /  ALT  1028<H>  /  AlkPhos  233<H>  07-18    PT/INR - ( 18 Jul 2017 13:03 )   PT: 12.1 sec;   INR: 1.11 ratio         PTT - ( 19 Jul 2017 01:17 )  PTT:80.2 sec          RADIOLOGY & ADDITIONAL TESTS:    Imaging Personally Reviewed:  Consultant(s) Notes Reviewed:    Care Discussed with Consultants/Other Providers: Patient is a 32y old  Male who presents with a chief complaint of R Epigastric pain (17 Jul 2017 19:44)    SUBJECTIVE / OVERNIGHT EVENTS:  Patient seen at bedside, reports pain is much improved. Still some discomfort intermittently in the region with occasional hiccups. No issues or concerns at this time.     MEDICATIONS  (STANDING):  sodium chloride 0.9%. 1000 milliLiter(s) (150 mL/Hr) IV Continuous <Continuous>  heparin  Infusion.  Unit(s)/Hr (21 mL/Hr) IV Continuous <Continuous>    MEDICATIONS  (PRN):  heparin  Injectable 9500 Unit(s) IV Push every 6 hours PRN For aPTT less than 40  heparin  Injectable 4500 Unit(s) IV Push every 6 hours PRN For aPTT between 40 - 57    Vital Signs Last 24 Hrs  T(C): 36.5 (07-19-17 @ 05:36), Max: 36.8 (07-18-17 @ 15:02)  HR: 65 (07-19-17 @ 05:36) (65 - 75)  BP: 133/86 (07-19-17 @ 05:36) (133/86 - 146/77)  RR: 18 (07-19-17 @ 05:36) (16 - 18)  SpO2: 95% (07-19-17 @ 05:36) (95% - 97%)  CAPILLARY BLOOD GLUCOSE    I&O's Summary    18 Jul 2017 07:01  -  19 Jul 2017 06:57  --------------------------------------------------------  IN: 3736.8 mL / OUT: 0 mL / NET: 3736.8 mL    PHYSICAL EXAM  GENERAL: NAD, well-developed, young male  EYES: EOMI, PERRLA, conjunctive and sclera with minimal yellowing consistent with jaundice  CHEST/LUNG: Clear to auscultation bilaterally; No wheezes, rales or rhonchi  HEART: Regular rate and rhythm; No murmurs, rubs, or gallops  ABDOMEN: Soft, Nontender, Nondistended; Bowel sounds present, no organomegaly  EXTREMITIES:  2+ Peripheral Pulses, No clubbing, cyanosis, or edema    LABS:                        15.3   7.0   )-----------( 198      ( 19 Jul 2017 07:48 )             44.8     07-19    137  |  99  |  11  ----------------------------<  93  3.9   |  25  |  0.89    Ca    9.6      19 Jul 2017 07:48  Phos  3.0     07-19  Mg     2.1     07-19    TPro  7.6  /  Alb  4.0  /  TBili  7.5<H>  /  DBili  x   /  AST  579<H>  /  ALT  1109<H>  /  AlkPhos  251<H>  07-19    LIVER FUNCTIONS - ( 19 Jul 2017 07:48 )  Alb: 4.0 g/dL / Pro: 7.6 g/dL / ALK PHOS: 251 U/L / ALT: 1109 U/L RC / AST: 579 U/L / GGT: x           RADIOLOGY & ADDITIONAL TESTS:  MRV cancelled  MRCP: as per conversation with radiologist: no evidence of thrombosis of the   Imaging Personally Reviewed:  Consultant(s) Notes Reviewed:    Care Discussed with Consultants/Other Providers: Patient is a 32y old  Male who presents with a chief complaint of R Epigastric pain (17 Jul 2017 19:44)    SUBJECTIVE / OVERNIGHT EVENTS:  Patient seen at bedside, reports pain is much improved. Still some discomfort intermittently in the region with occasional hiccups. No issues or concerns at this time.     MEDICATIONS  (STANDING):  sodium chloride 0.9%. 1000 milliLiter(s) (150 mL/Hr) IV Continuous <Continuous>  heparin  Infusion.  Unit(s)/Hr (21 mL/Hr) IV Continuous <Continuous>    MEDICATIONS  (PRN):  heparin  Injectable 9500 Unit(s) IV Push every 6 hours PRN For aPTT less than 40  heparin  Injectable 4500 Unit(s) IV Push every 6 hours PRN For aPTT between 40 - 57    Vital Signs Last 24 Hrs  T(C): 36.5 (07-19-17 @ 05:36), Max: 36.8 (07-18-17 @ 15:02)  HR: 65 (07-19-17 @ 05:36) (65 - 75)  BP: 133/86 (07-19-17 @ 05:36) (133/86 - 146/77)  RR: 18 (07-19-17 @ 05:36) (16 - 18)  SpO2: 95% (07-19-17 @ 05:36) (95% - 97%)  CAPILLARY BLOOD GLUCOSE    I&O's Summary    18 Jul 2017 07:01  -  19 Jul 2017 06:57  --------------------------------------------------------  IN: 3736.8 mL / OUT: 0 mL / NET: 3736.8 mL    PHYSICAL EXAM  GENERAL: NAD, well-developed, young male  EYES: EOMI, PERRLA, conjunctive and sclera with minimal yellowing consistent with jaundice  CHEST/LUNG: Clear to auscultation bilaterally; No wheezes, rales or rhonchi  HEART: Regular rate and rhythm; No murmurs, rubs, or gallops  ABDOMEN: Soft, Nontender, Nondistended; Bowel sounds present, no organomegaly  EXTREMITIES:  2+ Peripheral Pulses, No clubbing, cyanosis, or edema    LABS:                        15.3   7.0   )-----------( 198      ( 19 Jul 2017 07:48 )             44.8     07-19    137  |  99  |  11  ----------------------------<  93  3.9   |  25  |  0.89    Ca    9.6      19 Jul 2017 07:48  Phos  3.0     07-19  Mg     2.1     07-19    TPro  7.6  /  Alb  4.0  /  TBili  7.5<H>  /  DBili  x   /  AST  579<H>  /  ALT  1109<H>  /  AlkPhos  251<H>  07-19    LIVER FUNCTIONS - ( 19 Jul 2017 07:48 )  Alb: 4.0 g/dL / Pro: 7.6 g/dL / ALK PHOS: 251 U/L / ALT: 1109 U/L RC / AST: 579 U/L / GGT: x           RADIOLOGY & ADDITIONAL TESTS:  MRV cancelled  MRCP: as per conversation with radiologist: no evidence of thrombosis of the

## 2017-07-19 NOTE — PROGRESS NOTE ADULT - ASSESSMENT
32 year old man with chemistries consistent with acute hepatitis of unclear etiology. MRI also suggests choledocholithiasis.   Agree with plan for EUS/ERCP.   Unclear cause of hepatitis; no clinical signs of cholangitis or cholecystitis currently.  Given the significant transaminitis and hepatitis, we would recommend cholecystectomy but at an interval of 4-6 weeks to allow the inflammation to subside.  Will follow along.  Discussed with primary team residents.

## 2017-07-19 NOTE — PROGRESS NOTE ADULT - PROBLEM SELECTOR PLAN 3
Patient started on heparin gtt, with plans for changing to NOAC at some point in future; etiology is usually underlying liver disease, but no evidence of cirrhosis/liver disease at this time  -possibility of coagulopathy? - MRCP did not show evidence of thrombosis, instead noted narrowing of the CBD - GI will evaluate for likely ERCP tomorrow  - heparin discontinued

## 2017-07-19 NOTE — PROGRESS NOTE ADULT - ASSESSMENT
33 y/o M w/ no sig PMHx p/w R epigastric pain x 3 days and dark urine, found to have US finding concerning for choledocolithiasis and acute cholecystitis.

## 2017-07-19 NOTE — CHART NOTE - NSCHARTNOTEFT_GEN_A_CORE
MRI abdomen was negative for portal vein thrombus.  Discussed findings and recommendations with the primary team.  Heparin drip was discontinued.  Hematology will sign off.  Please feel free to call us anytime with any questions.    Shameka Yarbrough  Hematology Fellow   [phone] MRI abdomen was negative for portal vein thrombus.  Discussed findings and recommendations with the primary team.  Heparin drip was discontinued.  Awaiting LA, beta -2 glycoprotein, anti-cardiolipin, protein c/s ordered by primary team.  Please feel free to call us anytime with any questions.    Shameka Yarbrough  Hematology Fellow   [phone]

## 2017-07-19 NOTE — PROGRESS NOTE ADULT - PROBLEM SELECTOR PLAN 1
- dilated CBD and echo signs of acute heaven and +Alarcon sign  - will check MRCP and GI consulted, can consider ERCP pending MRCP results  - Surgery consulted for possible cholecystectomy, but they feel pt is less likely to have cholecystitis and pt's symptoms may all be related to portal vein thrombosis?   - cover with zosyn for now, but possible role in d/c abx if no evidence of cholecystitis   - NS at 150cc/hr - MRCP showed narrowing of the CBD distally, no evidence of portal vein thrombosis  - GI planning for ERCP likely tomorrow - MRCP showed narrowing and calculus of the CBD, no evidence of portal vein thrombosis  - GI planning for ERCP likely tomorrow

## 2017-07-19 NOTE — PROGRESS NOTE ADULT - PROBLEM SELECTOR PLAN 1
MRCP now shows patent hepatic vasculature, but an obstruction in the common bile duct. Patient will require ERCP for further evaluation and possible obstruction of common bile duct.   Will continue to evaluate causes for acute hepatitis as would expect alk phos elevation greater than ast/alt in setting of choledocolithiasis. ERMIAS and smooth muscle ab negative. Follow up further viral hepatitis serologies. Would also send for mitochondrial antibody if not already sent. MRCP now shows patent hepatic vasculature, but an obstruction in the common bile duct. Patient will require ERCP for further evaluation and possible obstruction of common bile duct. Advanced endoscopy consulted.   Will continue to evaluate causes for acute hepatitis as would expect alk phos elevation greater than ast/alt in setting of choledocolithiasis. ERMIAS and smooth muscle ab negative. Follow up further viral hepatitis serologies. Would also send for mitochondrial antibody if not already sent.  Continue to trend LFT's.

## 2017-07-19 NOTE — PROGRESS NOTE ADULT - SUBJECTIVE AND OBJECTIVE BOX
Chief Complaint:  Patient is a 32y old  Male who presents with a chief complaint of R Epigastric pain (17 Jul 2017 19:44)      Interval Events:   Overnight no events. Patient hasn't had any return of abdominal pain. Tolerating meals well this am.      Allergies:  No Known Allergies      Hospital Medications:  sodium chloride 0.9%. 1000 milliLiter(s) IV Continuous <Continuous>      PMHX/PSHX:  Eczema, unspecified type  No pertinent past medical history  No significant past surgical history      Family history:  Family history of gallbladder disease (Father, Grandparent)  Family history of coronary artery disease in mother (Mother)      ROS:     General:  No wt loss, fevers, chills, night sweats, fatigue,   Eyes:  Good vision, no reported pain  ENT:  No sore throat, pain, runny nose, dysphagia  CV:  No pain, palpitations, hypo/hypertension  Resp:  No dyspnea, cough, tachypnea, wheezing  GI:  See HPI  :  No pain, bleeding, incontinence, nocturia  Muscle:  No pain, weakness  Neuro:  No weakness, tingling, memory problems  Psych:  No fatigue, insomnia, mood problems, depression  Endocrine:  No polyuria, polydipsia, cold/heat intolerance  Heme:  No petechiae, ecchymosis, easy bruisability  Skin:  No rash, edema      PHYSICAL EXAM:     GENERAL:  Appears stated age, well-groomed, well-nourished, no distress  HEENT:  NC/AT,  conjunctivae clear, sclera -anicteric  CHEST:  Full & symmetric excursion, no increased effort, breath sounds clear  HEART:  Regular rhythm, S1, S2, no murmur/rub/S3/S4,  no edema  ABDOMEN:  Soft, non-tender, non-distended, normoactive bowel sounds,  no masses ,no hepato-splenomegaly,   EXTREMITIES:  no cyanosis,clubbing or edema  SKIN:  No rash/erythema/ecchymoses/petechiae/wounds/abscess/warm/dry  NEURO:  Alert, oriented    Vital Signs:  Vital Signs Last 24 Hrs  T(C): 36.5 (19 Jul 2017 05:36), Max: 36.8 (18 Jul 2017 15:02)  T(F): 97.7 (19 Jul 2017 05:36), Max: 98.2 (18 Jul 2017 15:02)  HR: 65 (19 Jul 2017 05:36) (65 - 75)  BP: 133/86 (19 Jul 2017 05:36) (133/86 - 146/77)  BP(mean): --  RR: 18 (19 Jul 2017 05:36) (16 - 18)  SpO2: 95% (19 Jul 2017 05:36) (95% - 97%)  Daily     Daily     LABS:                        15.3   7.0   )-----------( 198      ( 19 Jul 2017 07:48 )             44.8     07-19    137  |  99  |  11  ----------------------------<  93  3.9   |  25  |  0.89    Ca    9.6      19 Jul 2017 07:48  Phos  3.0     07-19  Mg     2.1     07-19    TPro  7.6  /  Alb  4.0  /  TBili  7.5<H>  /  DBili  x   /  AST  579<H>  /  ALT  1109<H>  /  AlkPhos  251<H>  07-19    LIVER FUNCTIONS - ( 19 Jul 2017 07:48 )  Alb: 4.0 g/dL / Pro: 7.6 g/dL / ALK PHOS: 251 U/L / ALT: 1109 U/L RC / AST: 579 U/L / GGT: x           PT/INR - ( 19 Jul 2017 07:48 )   PT: 12.1 sec;   INR: 1.11 ratio         PTT - ( 19 Jul 2017 07:48 )  PTT:98.1 sec        Imaging:  Preliminary MRI/MRV/MRCP imaging reviewed with radiologist which did not show any thrombus. All hepatic vasculature was patent. There was an abrupt obstruction of the common bile duct which can be suggestive of a choledocolithiasis. Chief Complaint:  Patient is a 32y old  Male who presents with a chief complaint of R Epigastric pain (17 Jul 2017 19:44)      Interval Events:   Overnight no events. Patient hasn't had any return of abdominal pain. Tolerating meals well this am.      Allergies:  No Known Allergies      Hospital Medications:  sodium chloride 0.9%. 1000 milliLiter(s) IV Continuous <Continuous>      PMHX/PSHX:  Eczema, unspecified type  No pertinent past medical history  No significant past surgical history      Family history:  Family history of gallbladder disease (Father, Grandparent)  Family history of coronary artery disease in mother (Mother)      ROS:     General:  No wt loss, fevers, chills, night sweats, fatigue,   Eyes:  Good vision, no reported pain  ENT:  No sore throat, pain, runny nose, dysphagia  CV:  No pain, palpitations, hypo/hypertension  Resp:  No dyspnea, cough, tachypnea, wheezing  GI:  See HPI  :  No pain, bleeding, incontinence, nocturia  Muscle:  No pain, weakness  Neuro:  No weakness, tingling, memory problems  Psych:  No fatigue, insomnia, mood problems, depression  Endocrine:  No polyuria, polydipsia, cold/heat intolerance  Heme:  No petechiae, ecchymosis, easy bruisability  Skin:  No rash, edema      PHYSICAL EXAM:     GENERAL:  Appears stated age, well-groomed, well-nourished, no distress  HEENT:  NC/AT,  conjunctivae clear, sclera -anicteric  CHEST:  Full & symmetric excursion, no increased effort, breath sounds clear  HEART:  Regular rhythm, S1, S2, no murmur/rub/S3/S4,  no edema  ABDOMEN:  Soft, non-tender, non-distended, normoactive bowel sounds,  no masses ,no hepato-splenomegaly,   EXTREMITIES:  no cyanosis,clubbing or edema  SKIN:  No rash/erythema/ecchymoses/petechiae/wounds/abscess/warm/dry  NEURO:  Alert, oriented    Vital Signs:  Vital Signs Last 24 Hrs  T(C): 36.5 (19 Jul 2017 05:36), Max: 36.8 (18 Jul 2017 15:02)  T(F): 97.7 (19 Jul 2017 05:36), Max: 98.2 (18 Jul 2017 15:02)  HR: 65 (19 Jul 2017 05:36) (65 - 75)  BP: 133/86 (19 Jul 2017 05:36) (133/86 - 146/77)  BP(mean): --  RR: 18 (19 Jul 2017 05:36) (16 - 18)  SpO2: 95% (19 Jul 2017 05:36) (95% - 97%)  Daily     Daily     LABS:                        15.3   7.0   )-----------( 198      ( 19 Jul 2017 07:48 )             44.8     07-19    137  |  99  |  11  ----------------------------<  93  3.9   |  25  |  0.89    Ca    9.6      19 Jul 2017 07:48  Phos  3.0     07-19  Mg     2.1     07-19    TPro  7.6  /  Alb  4.0  /  TBili  7.5<H>  /  DBili  x   /  AST  579<H>  /  ALT  1109<H>  /  AlkPhos  251<H>  07-19    LIVER FUNCTIONS - ( 19 Jul 2017 07:48 )  Alb: 4.0 g/dL / Pro: 7.6 g/dL / ALK PHOS: 251 U/L / ALT: 1109 U/L RC / AST: 579 U/L / GGT: x           PT/INR - ( 19 Jul 2017 07:48 )   PT: 12.1 sec;   INR: 1.11 ratio         PTT - ( 19 Jul 2017 07:48 )  PTT:98.1 sec        Imaging:      < from: MRI Abdomen w/wo Cont (07.18.17 @ 19:29) >  FINDINGS:    LOWER CHEST: Within normal limits.    LIVER: Hepatic steatosis  BILE DUCTS: Mild intrahepatic biliary ductal dilatation. The CBD measures   up to 8mm in diameter with narrowing distally. Choledocholithiasis with 4   mm filling defect within the distal CBD, image 35 series 14.   GALLBLADDER: Cholelithiasis. Mildly distended gallbladder.  SPLEEN: Within normal limits.  PANCREAS: Within normal limits.  ADRENALS: Within normal limits.  KIDNEYS/URETERS: Within normal limits.    VISUALIZED PORTIONS:    BOWEL: Within normal limits.   PERITONEUM: No ascites.  VESSELS: No portal vein thrombus.  RETROPERITONEUM: No lymphadenopathy.    ABDOMINAL WALL: Within normal limits.  BONES: Within normal limits.    IMPRESSION:   1. No portal vein thrombus.  2. Cholelithiasis.  3. Mild biliarydilatation with questionable 4 mm CBD calculus.   Additional narrowing of the distal CBD; consider ERCP for further   evaluation.    < end of copied text >

## 2017-07-20 LAB
ALBUMIN SERPL ELPH-MCNC: 4.3 G/DL — SIGNIFICANT CHANGE UP (ref 3.3–5)
ALP SERPL-CCNC: 256 U/L — HIGH (ref 40–120)
ALT FLD-CCNC: 1094 U/L RC — HIGH (ref 10–45)
ANION GAP SERPL CALC-SCNC: 15 MMOL/L — SIGNIFICANT CHANGE UP (ref 5–17)
APTT BLD: 32.4 SEC — SIGNIFICANT CHANGE UP (ref 27.5–37.4)
AST SERPL-CCNC: 431 U/L — HIGH (ref 10–40)
BILIRUB SERPL-MCNC: 8.1 MG/DL — HIGH (ref 0.2–1.2)
BLD GP AB SCN SERPL QL: NEGATIVE — SIGNIFICANT CHANGE UP
BUN SERPL-MCNC: 10 MG/DL — SIGNIFICANT CHANGE UP (ref 7–23)
CALCIUM SERPL-MCNC: 10.1 MG/DL — SIGNIFICANT CHANGE UP (ref 8.4–10.5)
CARDIOLIPIN AB SER-ACNC: NEGATIVE — SIGNIFICANT CHANGE UP
CHLORIDE SERPL-SCNC: 100 MMOL/L — SIGNIFICANT CHANGE UP (ref 96–108)
CO2 SERPL-SCNC: 23 MMOL/L — SIGNIFICANT CHANGE UP (ref 22–31)
CREAT SERPL-MCNC: 0.91 MG/DL — SIGNIFICANT CHANGE UP (ref 0.5–1.3)
GLUCOSE SERPL-MCNC: 100 MG/DL — HIGH (ref 70–99)
HCV RNA FLD QL NAA+PROBE: SIGNIFICANT CHANGE UP
HCV RNA SPEC QL PROBE+SIG AMP: SIGNIFICANT CHANGE UP
INR BLD: 1.01 RATIO — SIGNIFICANT CHANGE UP (ref 0.88–1.16)
MAGNESIUM SERPL-MCNC: 2.2 MG/DL — SIGNIFICANT CHANGE UP (ref 1.6–2.6)
PHOSPHATE SERPL-MCNC: 3.4 MG/DL — SIGNIFICANT CHANGE UP (ref 2.5–4.5)
POTASSIUM SERPL-MCNC: 4 MMOL/L — SIGNIFICANT CHANGE UP (ref 3.5–5.3)
POTASSIUM SERPL-SCNC: 4 MMOL/L — SIGNIFICANT CHANGE UP (ref 3.5–5.3)
PROT SERPL-MCNC: 8.1 G/DL — SIGNIFICANT CHANGE UP (ref 6–8.3)
PROTHROM AB SERPL-ACNC: 10.9 SEC — SIGNIFICANT CHANGE UP (ref 9.8–12.7)
RH IG SCN BLD-IMP: POSITIVE — SIGNIFICANT CHANGE UP
SODIUM SERPL-SCNC: 138 MMOL/L — SIGNIFICANT CHANGE UP (ref 135–145)

## 2017-07-20 PROCEDURE — 99232 SBSQ HOSP IP/OBS MODERATE 35: CPT | Mod: GC

## 2017-07-20 PROCEDURE — 43264 ERCP REMOVE DUCT CALCULI: CPT | Mod: GC

## 2017-07-20 PROCEDURE — 43274 ERCP DUCT STENT PLACEMENT: CPT | Mod: GC

## 2017-07-20 PROCEDURE — 74328 X-RAY BILE DUCT ENDOSCOPY: CPT | Mod: 26,GC

## 2017-07-20 RX ADMIN — SODIUM CHLORIDE 75 MILLILITER(S): 9 INJECTION INTRAMUSCULAR; INTRAVENOUS; SUBCUTANEOUS at 11:47

## 2017-07-20 NOTE — PROGRESS NOTE ADULT - PROBLEM SELECTOR PLAN 2
- Bilirubin trending   - Hep panel unremarkable, CMV IgG positive, but IgM negative, other work up pending (ERMIAS, smooth muscle, microsomal) - Bilirubin trending up, 2/2 choledocolithiasis will go for ERCP tomorrow  - Hep panel unremarkable, CMV IgG positive, but IgM negative, other work up pending (ERMIAS, smooth muscle, microsomal)

## 2017-07-20 NOTE — PROGRESS NOTE ADULT - PROBLEM SELECTOR PLAN 1
- MRCP showed narrowing and calculus of the CBD, no evidence of portal vein thrombosis  - GI planning for ERCP likely tomorrow  - Surgery (Dr. Trevizo) will f/u w/ patient 6 weeks from discharge for discussion of elective cholecystectomy - MRCP showed narrowing and calculus of the CBD, no evidence of portal vein thrombosis  - GI planning for ERCP tomorrow, were not able to schedule for today  - Surgery (Dr. Trevizo) will f/u w/ patient 6 weeks from discharge for discussion of elective cholecystectomy

## 2017-07-20 NOTE — PROGRESS NOTE ADULT - ASSESSMENT
31 yo M hx eczema presents with acute RUQ pain and transaminitis found to have obstruction of common bile duct on MRCP. No evidence of portal vein thrombosis as previously reported on ultrasound.

## 2017-07-20 NOTE — PROGRESS NOTE ADULT - SUBJECTIVE AND OBJECTIVE BOX
No acute events overnight. Patient scheduled for ERCP today. He feels well and feels ready to go home.    T(C): 36.6 (07-20-17 @ 04:32), Max: 37.2 (07-19-17 @ 14:17)  HR: 63 (07-20-17 @ 04:32) (60 - 89)  BP: 136/86 (07-20-17 @ 04:32) (128/76 - 136/86)  RR: 18 (07-20-17 @ 04:32) (17 - 18)  SpO2: 97% (07-20-17 @ 04:32) (96% - 97%)  Wt(kg): --  07-19 @ 07:01  -  07-20 @ 07:00  --------------------------------------------------------  IN:    Oral Fluid: 480 mL    sodium chloride 0.9%.: 2100 mL  Total IN: 2580 mL    OUT:  Total OUT: 0 mL    Total NET: 2580 mL      General: alert and oriented, NAD  Resp: airway patent, respirations unlabored  CVS: regular rate and rhythm  Abdomen: soft, nontender, nondistended  Extremities: no edema  Skin: warm, dry                          15.3   7.0   )-----------( 198      ( 19 Jul 2017 07:48 )             44.8     20 Jul 2017 06:54    138    |  100    |  10     ----------------------------<  100    4.0     |  23     |  0.91     Ca    10.1       20 Jul 2017 06:54  Phos  3.4       20 Jul 2017 06:54  Mg     2.2       20 Jul 2017 06:54    TPro  8.1    /  Alb  4.3    /  TBili  8.1    /  DBili  x      /  AST  431    /  ALT  1094   /  AlkPhos  256    20 Jul 2017 06:54    LIVER FUNCTIONS - ( 20 Jul 2017 06:54 )  Alb: 4.3 g/dL / Pro: 8.1 g/dL / ALK PHOS: 256 U/L / ALT: 1094 U/L RC / AST: 431 U/L / GGT: x           PT/INR - ( 20 Jul 2017 06:54 )   PT: 10.9 sec;   INR: 1.01 ratio         PTT - ( 20 Jul 2017 06:54 )  PTT:32.4 sec  CAPILLARY BLOOD GLUCOSE

## 2017-07-20 NOTE — PROGRESS NOTE ADULT - ASSESSMENT
32 year old man with choledocholithiasis and transamnitis  Plan:   ERCP today per GI  -f/u LFT's post-operatively  -patient may need cholecystectomy in the next few months pending workup and resolution of possible liver inflammation  #[id number]

## 2017-07-20 NOTE — PROGRESS NOTE ADULT - SUBJECTIVE AND OBJECTIVE BOX
Patient is a 32y old  Male who presents with a chief complaint of R Epigastric pain (17 Jul 2017 19:44)    SUBJECTIVE / OVERNIGHT EVENTS:  Patient seen at bedside. Reports resolved abdominal pain and discomfort, feels hungry. No concerns at this time.     MEDICATIONS  (STANDING):  sodium chloride 0.9%. 1000 milliLiter(s) (75 mL/Hr) IV Continuous <Continuous>    MEDICATIONS  (PRN):    Vital Signs Last 24 Hrs  T(C): 36.6 (07-20-17 @ 04:32), Max: 37.2 (07-19-17 @ 14:17)  HR: 63 (07-20-17 @ 04:32) (60 - 89)  BP: 136/86 (07-20-17 @ 04:32) (128/76 - 136/86)  RR: 18 (07-20-17 @ 04:32) (17 - 18)  SpO2: 97% (07-20-17 @ 04:32) (96% - 97%)  CAPILLARY BLOOD GLUCOSE    I&O's Summary    19 Jul 2017 07:01  -  20 Jul 2017 07:00  --------------------------------------------------------  IN: 2580 mL / OUT: 0 mL / NET: 2580 mL    PHYSICAL EXAM  GENERAL: NAD, young male  CHEST/LUNG: Clear to auscultation bilaterally; No wheezes, rales or rhonchi  HEART: Regular rate and rhythm; No murmurs, rubs, or gallops  ABDOMEN: Soft, nondistended abdomen. Mild discomfort in the RUQ. No tenderness to palpation. No organomegaly  EXTREMITIES:  2+ Peripheral Pulses, No clubbing, cyanosis, or edema  Skin: Note of eczematous lesions in the belly and extremities    LABS:                        15.3   7.0   )-----------( 198      ( 19 Jul 2017 07:48 )             44.8     07-20    138  |  100  |  10  ----------------------------<  100<H>  4.0   |  23  |  0.91    Ca    10.1      20 Jul 2017 06:54  Phos  3.4     07-20  Mg     2.2     07-20    TPro  8.1  /  Alb  4.3  /  TBili  8.1<H>  /  DBili  x   /  AST  431<H>  /  ALT  1094<H>  /  AlkPhos  256<H>  07-20    PT/INR - ( 20 Jul 2017 06:54 )   PT: 10.9 sec;   INR: 1.01 ratio         PTT - ( 20 Jul 2017 06:54 )  PTT:32.4 sec    RADIOLOGY & ADDITIONAL TESTS:  MRI of the Abdomen w/ and w/o contrast   1. No portal vein thrombus.  2. Cholelithiasis.  3. Mild biliary dilatation with questionable 4 mm CBD calculus.   Additional narrowing of the distal CBD; consider ERCP for further   evaluation.    No new imaging performed

## 2017-07-20 NOTE — PROGRESS NOTE ADULT - PROBLEM SELECTOR PLAN 4
- no evidence of thrombosis on MRI imaging, heparin discontinued  - hypercoagulable workup with mildly abnormal lab values, will f/u with heme - DVT ppx- low risk, patient ambulating, no pharm ppx needed

## 2017-07-20 NOTE — PROGRESS NOTE ADULT - PROBLEM SELECTOR PLAN 3
- MRCP did not show evidence of thrombosis, instead noted narrowing of the CBD - GI will evaluate for likely ERCP tomorrow  - heparin discontinued - no evidence of thrombosis on MRI imaging, heparin discontinued  - spoke w/ heme in regards to LA positive on coag w/u. Ordered B2 microglobulin  - per discussion, will need to be off anticoagulation and will need to f/u with heme 12 weeks from discharge for repeat blood work

## 2017-07-20 NOTE — PROGRESS NOTE ADULT - PROBLEM SELECTOR PLAN 1
MRCP showed patent hepatic vasculature, but an obstruction in the common bile duct.   Although patient is asymptomatic, LFT's remain elevated with worsening bilirubin today so patient will benefit from ERCP. Will plan for ERCP tomorrow.    Advanced endoscopy consulted.   Will continue to evaluate causes for acute hepatitis as would expect alk phos elevation greater than ast/alt in setting of choledocolithiasis. ERMIAS and smooth muscle ab negative. Follow up further viral hepatitis serologies. Anti-mitochondrial ab pending.   Continue to trend LFT's.

## 2017-07-20 NOTE — PROGRESS NOTE ADULT - ATTENDING COMMENTS
Patient is being seen for abnormal liver tests with high liver enzymes suggestive of acute liver injury and hyperbilirubinemia.  Acute hepatitis so far is ruled out. No PVT seen on MRI but CBD obstruction and IHD dilatation were  noted.   Patient has improved clinically. He denies abdominal pain, nausea, or vomiting but biochemistry showed worsening hyperbilirubinemia and persistent very high ALT > 1000.  Biliary service was consulted and an ERCP is to be performed.  Will recommend trend liver tests.

## 2017-07-21 LAB
ALBUMIN SERPL ELPH-MCNC: 4 G/DL — SIGNIFICANT CHANGE UP (ref 3.3–5)
ALP SERPL-CCNC: 250 U/L — HIGH (ref 40–120)
ALT FLD-CCNC: 948 U/L RC — HIGH (ref 10–45)
ANION GAP SERPL CALC-SCNC: 15 MMOL/L — SIGNIFICANT CHANGE UP (ref 5–17)
AST SERPL-CCNC: 334 U/L — HIGH (ref 10–40)
BILIRUB SERPL-MCNC: 7.9 MG/DL — HIGH (ref 0.2–1.2)
BUN SERPL-MCNC: 8 MG/DL — SIGNIFICANT CHANGE UP (ref 7–23)
CALCIUM SERPL-MCNC: 10.3 MG/DL — SIGNIFICANT CHANGE UP (ref 8.4–10.5)
CHLORIDE SERPL-SCNC: 100 MMOL/L — SIGNIFICANT CHANGE UP (ref 96–108)
CO2 SERPL-SCNC: 22 MMOL/L — SIGNIFICANT CHANGE UP (ref 22–31)
CREAT SERPL-MCNC: 0.9 MG/DL — SIGNIFICANT CHANGE UP (ref 0.5–1.3)
GLUCOSE SERPL-MCNC: 112 MG/DL — HIGH (ref 70–99)
HCT VFR BLD CALC: 49.9 % — SIGNIFICANT CHANGE UP (ref 39–50)
HEV AB FLD QL: NEGATIVE — SIGNIFICANT CHANGE UP
HGB BLD-MCNC: 15.5 G/DL — SIGNIFICANT CHANGE UP (ref 13–17)
MAGNESIUM SERPL-MCNC: 2.4 MG/DL — SIGNIFICANT CHANGE UP (ref 1.6–2.6)
MCHC RBC-ENTMCNC: 28.3 PG — SIGNIFICANT CHANGE UP (ref 27–34)
MCHC RBC-ENTMCNC: 31.1 GM/DL — LOW (ref 32–36)
MCV RBC AUTO: 91.1 FL — SIGNIFICANT CHANGE UP (ref 80–100)
MITOCHONDRIA AB SER-ACNC: SIGNIFICANT CHANGE UP
PHOSPHATE SERPL-MCNC: 3.9 MG/DL — SIGNIFICANT CHANGE UP (ref 2.5–4.5)
PLATELET # BLD AUTO: 210 K/UL — SIGNIFICANT CHANGE UP (ref 150–400)
POTASSIUM SERPL-MCNC: 4 MMOL/L — SIGNIFICANT CHANGE UP (ref 3.5–5.3)
POTASSIUM SERPL-SCNC: 4 MMOL/L — SIGNIFICANT CHANGE UP (ref 3.5–5.3)
PROT SERPL-MCNC: 8 G/DL — SIGNIFICANT CHANGE UP (ref 6–8.3)
RBC # BLD: 5.48 M/UL — SIGNIFICANT CHANGE UP (ref 4.2–5.8)
RBC # FLD: 12.6 % — SIGNIFICANT CHANGE UP (ref 10.3–14.5)
SODIUM SERPL-SCNC: 137 MMOL/L — SIGNIFICANT CHANGE UP (ref 135–145)
WBC # BLD: 6.6 K/UL — SIGNIFICANT CHANGE UP (ref 3.8–10.5)
WBC # FLD AUTO: 6.6 K/UL — SIGNIFICANT CHANGE UP (ref 3.8–10.5)

## 2017-07-21 PROCEDURE — 99232 SBSQ HOSP IP/OBS MODERATE 35: CPT | Mod: GC

## 2017-07-21 NOTE — PROGRESS NOTE ADULT - SUBJECTIVE AND OBJECTIVE BOX
Patient is a 32y old  Male who presents with a chief complaint of R Epigastric pain (17 Jul 2017 19:44)    SUBJECTIVE / OVERNIGHT EVENTS:  Patient seen at bedside, reports no events overnight. Will go for ERCP.     MEDICATIONS  (STANDING):  sodium chloride 0.9%. 1000 milliLiter(s) (75 mL/Hr) IV Continuous <Continuous>    MEDICATIONS  (PRN):    CAPILLARY BLOOD GLUCOSE    I&O's Summary    20 Jul 2017 07:01  -  21 Jul 2017 07:00  --------------------------------------------------------  IN: 1200 mL / OUT: 0 mL / NET: 1200 mL    PHYSICAL EXAM  GENERAL: NAD, well-developed, young male  HEAD:  Atraumatic, Normocephalic  EYES: EOMI, PERRLA, some conjunctival yellowing  CHEST/LUNG: Clear to auscultation bilaterally; No wheezes, rales or rhonchi  HEART: Regular rate and rhythm; No murmurs, rubs, or gallops  ABDOMEN: Soft, Nontender, Nondistended; Bowel sounds present, no organomegaly  EXTREMITIES:  2+ Peripheral Pulses, No clubbing, cyanosis, or edema  SKIN: Eczematous lesions over belly and extremities    LABS:                        15.5   6.6   )-----------( 210      ( 21 Jul 2017 07:08 )             49.9     07-21    137  |  100  |  8   ----------------------------<  112<H>  4.0   |  22  |  0.90    Ca    10.3      21 Jul 2017 07:08  Phos  3.9     07-21  Mg     2.4     07-21    TPro  8.0  /  Alb  4.0  /  TBili  7.9<H>  /  DBili  x   /  AST  334<H>  /  ALT  948<H>  /  AlkPhos  250<H>  07-21    PT/INR - ( 20 Jul 2017 06:54 )   PT: 10.9 sec;   INR: 1.01 ratio         PTT - ( 20 Jul 2017 06:54 )  PTT:32.4 sec    LIVER FUNCTIONS - ( 21 Jul 2017 07:08 )  Alb: 4.0 g/dL / Pro: 8.0 g/dL / ALK PHOS: 250 U/L / ALT: 948 U/L RC / AST: 334 U/L / GGT: x           RADIOLOGY & ADDITIONAL TESTS:  No new imaging performed

## 2017-07-21 NOTE — PROGRESS NOTE ADULT - ATTENDING COMMENTS
Work up sent prior to MRV evaluation reviewed. Pt has no current thrombosis. There is presence of lupus anticoagulant which has no significance in the absence of thrombosis. Pt clinically improving on therapy for choledocholithiasis.

## 2017-07-21 NOTE — PROGRESS NOTE ADULT - PROBLEM SELECTOR PLAN 2
- patient went for ERCP, will f/u on new LFTs  - Hep panel unremarkable, CMV IgG positive, but IgM negative, other work up pending (ERMIAS, smooth muscle, microsomal)

## 2017-07-21 NOTE — PROGRESS NOTE ADULT - SUBJECTIVE AND OBJECTIVE BOX
No acute events overnight. Patient feels well. Complains of very mild RUQ tenderness to palpation    T(C): 36.5 (07-21-17 @ 06:45), Max: 36.7 (07-20-17 @ 21:11)  HR: 65 (07-21-17 @ 06:45) (60 - 72)  BP: 136/77 (07-21-17 @ 06:45) (136/77 - 155/84)  RR: 18 (07-21-17 @ 06:45) (16 - 18)  SpO2: 94% (07-21-17 @ 06:45) (94% - 97%)  Wt(kg): --  07-20 @ 07:01  -  07-21 @ 07:00  --------------------------------------------------------  IN:    Oral Fluid: 300 mL    sodium chloride 0.9%.: 900 mL  Total IN: 1200 mL    OUT:  Total OUT: 0 mL    Total NET: 1200 mL      General: alert and oriented, NAD  Resp: airway patent, respirations unlabored  CVS: regular rate and rhythm  Abdomen: soft, minimal RUQ tenderness, nondistended  Extremities: no edema  Skin: warm, dry, appropriate color                          15.5   6.6   )-----------( 210      ( 21 Jul 2017 07:08 )             49.9     21 Jul 2017 07:08    137    |  100    |  8      ----------------------------<  112    4.0     |  22     |  0.90     Ca    10.3       21 Jul 2017 07:08  Phos  3.9       21 Jul 2017 07:08  Mg     2.4       21 Jul 2017 07:08    TPro  8.0    /  Alb  4.0    /  TBili  7.9    /  DBili  x      /  AST  334    /  ALT  948    /  AlkPhos  250    21 Jul 2017 07:08    LIVER FUNCTIONS - ( 21 Jul 2017 07:08 )  Alb: 4.0 g/dL / Pro: 8.0 g/dL / ALK PHOS: 250 U/L / ALT: 948 U/L RC / AST: 334 U/L / GGT: x           PT/INR - ( 20 Jul 2017 06:54 )   PT: 10.9 sec;   INR: 1.01 ratio         PTT - ( 20 Jul 2017 06:54 )  PTT:32.4 sec  CAPILLARY BLOOD GLUCOSE

## 2017-07-21 NOTE — PROGRESS NOTE ADULT - SUBJECTIVE AND OBJECTIVE BOX
Therapeutic endoscopy procedure note (full report to follow on sunrise)    Procedure: ERCP    Indication: Choledocholithiasis     Findings: The  film was normal.  The esophagus was successfully intubated under direct vision.  The scope was advanced to a normal major papilla in the descending duodenum without detailed examination of the pharynx, larynx and associated structures, and upper GI tract.  The upper GI tract was grossly normal.  The bile duct deeply cannulated with the sphincterotome.  Contrast was injected.  I personally interpreted the bile duct images.  There was brisk flow of contrast through the ducts. The in the biliary system was normal.  Biliary sphincterotomy was made with a traction (standard) sphincterotome.  There was no post-sphincterotomy bleeding.  To discover objects, the biliary tree was swept with an 11 mm balloon starting at the bifurcation.  Sludge and fragments of stones were swept from the duct.   Final cholangiogram showed no remaining stones.      Imp: Choledocholithiasis s/p ERCP with extraction of stones/sludge and sphincterotomy.    Recs:  - Return patient to hospital mata for ongoing care  - Clear liquid diet today, and advance tomorrow  - Referral to surgery for cholecystectomy    Chuy Velazquez  GI Fellow

## 2017-07-21 NOTE — PROGRESS NOTE ADULT - ATTENDING COMMENTS
Patient is being followed for acute RUQ pain and acute marked elevation of liver enzymes and hyperbilirubinemia suggestive of acute hepatitis or acute liver injury.   Workup so far revealed CBD obstruction and IHD dilatation.  Liver tests appears downtrending.  ERCP was performed today. Official report pending.   Will recommend- trend liver tests, continue workup for abnormal liver tests

## 2017-07-21 NOTE — PROGRESS NOTE ADULT - PROBLEM SELECTOR PLAN 1
- MRCP showed narrowing and calculus of the CBD, no evidence of portal vein thrombosis  - taken for ERCP today, will await GI recs  - Surgery (Dr. Trevizo) will f/u w/ patient 6 weeks from discharge for discussion of elective cholecystectomy

## 2017-07-21 NOTE — PROGRESS NOTE ADULT - SUBJECTIVE AND OBJECTIVE BOX
Pre-Endoscopy Evaluation      Referring Physician:  Brian Thomas MD                               Procedure: ERCP    Indication for Procedure: CBD stone    Pertinent History: 33 yo male with no significant pmh admitted with acute RUQ pain, abnormal LFT's, found to have choledocholithiasis on imaging      PAST MEDICAL & SURGICAL HISTORY:  Eczema, unspecified type  No significant past surgical history      PMH of Gastroparesis [ ]  Gastric Surgery [ ]  Gastric Outlet Obstruction [ ] no    Allergies    No Known Allergies      Latex allergy: [ ] yes [x] no    Medications:MEDICATIONS  (STANDING):  sodium chloride 0.9%. 1000 milliLiter(s) (75 mL/Hr) IV Continuous <Continuous>    MEDICATIONS  (PRN):      Smoking: [ ] yes  [x] no    AICD/PPM: [ ] yes   [x] no    Pertinent lab data:                        15.5   6.6   )-----------( 210      ( 21 Jul 2017 07:08 )             49.9     07-21    137  |  100  |  8   ----------------------------<  112<H>  4.0   |  22  |  0.90    Ca    10.3      21 Jul 2017 07:08  Phos  3.9     07-21  Mg     2.4     07-21    TPro  8.0  /  Alb  4.0  /  TBili  7.9<H>  /  DBili  x   /  AST  334<H>  /  ALT  948<H>  /  AlkPhos  250<H>  07-21    PT/INR - ( 20 Jul 2017 06:54 )   PT: 10.9 sec;   INR: 1.01 ratio         PTT - ( 20 Jul 2017 06:54 )  PTT:32.4 sec        Physical Examination:       Daily   Vital Signs Last 24 Hrs  T(C): 36.5 (21 Jul 2017 06:45), Max: 36.7 (20 Jul 2017 21:11)  T(F): 97.7 (21 Jul 2017 06:45), Max: 98.1 (20 Jul 2017 21:11)  HR: 65 (21 Jul 2017 06:45) (60 - 72)  BP: 136/77 (21 Jul 2017 06:45) (136/77 - 155/84)  BP(mean): --  RR: 18 (21 Jul 2017 06:45) (16 - 18)  SpO2: 94% (21 Jul 2017 06:45) (94% - 97%)      Constitutional: NAD    HEENT: PERRLA, EOMI,       Neck:  No JVD    Respiratory: CTAB/L    Cardiovascular: S1 and S2    Gastrointestinal: BS+, soft, NT/ND    Extremities: No peripheral edema    Neurological: A/O x 3, no focal deficits    Psychiatric: Normal mood, normal affect    Comments:    ASA Class: I [x]  II [ ]  III [ ]  IV [ ]    The patient is a suitable candidate for the planned procedure unless box checked [ ]  No, explain:

## 2017-07-21 NOTE — PROGRESS NOTE ADULT - ASSESSMENT
33 y/o M with hx of eczema on topicals presented with RUQ abdominal pain x3 days, nausea and dark urine found to choledocholithiasis, dilated CBD, also findings concerning for acute cholecystitis.  Hematology consulted for anticoagulation management of suspected portal vein thrombosis found on Abd US.  MRV showed no portal vein thrombus, heparin drip discontinued.

## 2017-07-21 NOTE — PROGRESS NOTE ADULT - SUBJECTIVE AND OBJECTIVE BOX
INTERVAL HPI/OVERNIGHT EVENTS:  Patient S&E at bedside. No o/n events, patient resting comfortably. s/p ERCP.  Denies any abdominal pain, n/v.      VITAL SIGNS:  T(F): 97.7 (07-21-17 @ 13:15)  HR: 71 (07-21-17 @ 13:15)  BP: 138/90 (07-21-17 @ 13:15)  RR: 18 (07-21-17 @ 13:15)  SpO2: 94% (07-21-17 @ 13:15)  Wt(kg): --    PHYSICAL EXAM:  Constitutional: NAD, well nourished  Eyes: EOMI, sclera non-icteric  Neck: supple, no masses, no JVD  Respiratory: CTA b/l, good air entry b/l  Cardiovascular: RRR, no M/R/G  Gastrointestinal: soft, NT/ND, no masses palpable, + BS  Extremities: no edema b/l  Neurological: AAOx3  Skin: eczematous rash on abdomen      MEDICATIONS  (STANDING):    MEDICATIONS  (PRN):      Allergies    No Known Allergies    Intolerances        LABS:                        15.5   6.6   )-----------( 210      ( 21 Jul 2017 07:08 )             49.9     07-21    137  |  100  |  8   ----------------------------<  112<H>  4.0   |  22  |  0.90    Ca    10.3      21 Jul 2017 07:08  Phos  3.9     07-21  Mg     2.4     07-21    TPro  8.0  /  Alb  4.0  /  TBili  7.9<H>  /  DBili  x   /  AST  334<H>  /  ALT  948<H>  /  AlkPhos  250<H>  07-21    PT/INR - ( 20 Jul 2017 06:54 )   PT: 10.9 sec;   INR: 1.01 ratio         PTT - ( 20 Jul 2017 06:54 )  PTT:32.4 sec      RADIOLOGY & ADDITIONAL TESTS:  Studies reviewed.    ASSESSMENT & PLAN: INTERVAL HPI/OVERNIGHT EVENTS:  Patient S&E at bedside. No o/n events, patient resting comfortably. s/p ERCP.  Denies any abdominal pain, n/v.      VITAL SIGNS:  T(F): 97.7 (07-21-17 @ 13:15)  HR: 71 (07-21-17 @ 13:15)  BP: 138/90 (07-21-17 @ 13:15)  RR: 18 (07-21-17 @ 13:15)  SpO2: 94% (07-21-17 @ 13:15)  Wt(kg): --    PHYSICAL EXAM:  Constitutional: NAD, well nourished  Eyes: EOMI, sclera non-icteric  Neck: supple, no masses, no JVD  Respiratory: CTA b/l, good air entry b/l  Cardiovascular: RRR, no M/R/G  Gastrointestinal: soft, NT/ND, no masses palpable, + BS  Extremities: no edema b/l  Neurological: AAOx3  Skin: eczematous rash on abdomen    Allergies  No Known Allergies    LABS:                        15.5   6.6   )-----------( 210      ( 21 Jul 2017 07:08 )             49.9     07-21    137  |  100  |  8   ----------------------------<  112<H>  4.0   |  22  |  0.90    Ca    10.3      21 Jul 2017 07:08  Phos  3.9     07-21  Mg     2.4     07-21    TPro  8.0  /  Alb  4.0  /  TBili  7.9<H>  /  DBili  x   /  AST  334<H>  /  ALT  948<H>  /  AlkPhos  250<H>  07-21    PT/INR - ( 20 Jul 2017 06:54 )   PT: 10.9 sec;   INR: 1.01 ratio         PTT - ( 20 Jul 2017 06:54 )  PTT:32.4 sec      RADIOLOGY & ADDITIONAL TESTS:  Studies reviewed.    ASSESSMENT & PLAN:

## 2017-07-21 NOTE — PROGRESS NOTE ADULT - PROBLEM SELECTOR PLAN 3
- no evidence of thrombosis on MRI imaging, heparin discontinued  - spoke w/ heme in regards to LA positive on coag w/u. Ordered B2 microglobulin and repeat lupus anticoagulant  - per discussion, will need to be off anticoagulation and will need to f/u with heme 12 weeks from discharge for repeat blood work

## 2017-07-21 NOTE — PROGRESS NOTE ADULT - ASSESSMENT
32 year old man with choledocholithiasis and transaminitis   Plan:  ERCP today with GI  -trend LFT's  -will likely benefit from laparoscopic cholecystectomy after ERCP and once transaminitis resolves   -#[id number]

## 2017-07-21 NOTE — PROGRESS NOTE ADULT - SUBJECTIVE AND OBJECTIVE BOX
Chief Complaint:  Patient is a 32y old  Male who presents with a chief complaint of R Epigastric pain (17 Jul 2017 19:44)      Interval Events:   No overnight events. Pt has not had any abd pain this morning. NPO for ERCP.     Allergies:  No Known Allergies      Hospital Medications:  sodium chloride 0.9%. 1000 milliLiter(s) IV Continuous <Continuous>      PMHX/PSHX:  Eczema, unspecified type  No pertinent past medical history  No significant past surgical history      Family history:  Family history of gallbladder disease (Father, Grandparent)  Family history of coronary artery disease in mother (Mother)      ROS:     General:  No wt loss, fevers, chills, night sweats, fatigue,   Eyes:  Good vision, no reported pain  ENT:  No sore throat, pain, runny nose, dysphagia  CV:  No pain, palpitations, hypo/hypertension  Resp:  No dyspnea, cough, tachypnea, wheezing  GI:  See HPI  :  No pain, bleeding, incontinence, nocturia  Muscle:  No pain, weakness  Neuro:  No weakness, tingling, memory problems  Psych:  No fatigue, insomnia, mood problems, depression  Endocrine:  No polyuria, polydipsia, cold/heat intolerance  Heme:  No petechiae, ecchymosis, easy bruisability  Skin:  No rash, edema      PHYSICAL EXAM:     GENERAL:  Appears stated age, well-groomed, well-nourished, no distress  HEENT:  NC/AT,  conjunctivae clear,  slceral icterus  CHEST:  Full & symmetric excursion, no increased effort, breath sounds clear  HEART:  Regular rhythm, S1, S2, no murmur/rub/S3/S4,  no edema  ABDOMEN:  Soft, non-tender, non-distended, normoactive bowel sounds,  no masses ,no hepato-splenomegaly,   EXTREMITIES:  no cyanosis,clubbing or edema  SKIN:  No rash/erythema/ecchymoses/petechiae/wounds/abscess/warm/dry  NEURO:  Alert, oriented    Vital Signs:  Vital Signs Last 24 Hrs  T(C): 36.5 (21 Jul 2017 06:45), Max: 36.7 (20 Jul 2017 21:11)  T(F): 97.7 (21 Jul 2017 06:45), Max: 98.1 (20 Jul 2017 21:11)  HR: 65 (21 Jul 2017 06:45) (60 - 72)  BP: 136/77 (21 Jul 2017 06:45) (136/77 - 155/84)  BP(mean): --  RR: 18 (21 Jul 2017 06:45) (16 - 18)  SpO2: 94% (21 Jul 2017 06:45) (94% - 97%)  Daily     Daily     LABS:                        15.5   6.6   )-----------( 210      ( 21 Jul 2017 07:08 )             49.9     07-21    137  |  100  |  8   ----------------------------<  112<H>  4.0   |  22  |  0.90    Ca    10.3      21 Jul 2017 07:08  Phos  3.9     07-21  Mg     2.4     07-21    TPro  8.0  /  Alb  4.0  /  TBili  7.9<H>  /  DBili  x   /  AST  334<H>  /  ALT  948<H>  /  AlkPhos  250<H>  07-21    LIVER FUNCTIONS - ( 21 Jul 2017 07:08 )  Alb: 4.0 g/dL / Pro: 8.0 g/dL / ALK PHOS: 250 U/L / ALT: 948 U/L RC / AST: 334 U/L / GGT: x           PT/INR - ( 20 Jul 2017 06:54 )   PT: 10.9 sec;   INR: 1.01 ratio         PTT - ( 20 Jul 2017 06:54 )  PTT:32.4 sec        Imaging:

## 2017-07-21 NOTE — PROGRESS NOTE ADULT - PROBLEM SELECTOR PLAN 1
MRCP showed an obstruction in the common bile duct.   Although patient is asymptomatic, LFT's trending down, but still remained elevated.   Patient will get ERCP today.   Will continue to evaluate causes for acute hepatitis as would expect alk phos elevation greater than ast/alt in setting of choledocolithiasis. ERMIAS and smooth muscle ab negative. Follow up further viral hepatitis serologies. Anti-mitochondrial ab pending.   Continue to trend LFT's.

## 2017-07-22 ENCOUNTER — TRANSCRIPTION ENCOUNTER (OUTPATIENT)
Age: 32
End: 2017-07-22

## 2017-07-22 LAB
ALBUMIN SERPL ELPH-MCNC: 3.9 G/DL — SIGNIFICANT CHANGE UP (ref 3.3–5)
ALBUMIN SERPL ELPH-MCNC: 4 G/DL — SIGNIFICANT CHANGE UP (ref 3.3–5)
ALP SERPL-CCNC: 245 U/L — HIGH (ref 40–120)
ALP SERPL-CCNC: 259 U/L — HIGH (ref 40–120)
ALT FLD-CCNC: 820 U/L RC — HIGH (ref 10–45)
ALT FLD-CCNC: 842 U/L RC — HIGH (ref 10–45)
ANION GAP SERPL CALC-SCNC: 13 MMOL/L — SIGNIFICANT CHANGE UP (ref 5–17)
ANION GAP SERPL CALC-SCNC: 16 MMOL/L — SIGNIFICANT CHANGE UP (ref 5–17)
AST SERPL-CCNC: 277 U/L — HIGH (ref 10–40)
AST SERPL-CCNC: 282 U/L — HIGH (ref 10–40)
B2 GLYCOPROT1 AB SER QL: NEGATIVE — SIGNIFICANT CHANGE UP
BILIRUB SERPL-MCNC: 8.3 MG/DL — HIGH (ref 0.2–1.2)
BILIRUB SERPL-MCNC: 8.6 MG/DL — HIGH (ref 0.2–1.2)
BUN SERPL-MCNC: 14 MG/DL — SIGNIFICANT CHANGE UP (ref 7–23)
BUN SERPL-MCNC: 8 MG/DL — SIGNIFICANT CHANGE UP (ref 7–23)
CALCIUM SERPL-MCNC: 10 MG/DL — SIGNIFICANT CHANGE UP (ref 8.4–10.5)
CALCIUM SERPL-MCNC: 9.9 MG/DL — SIGNIFICANT CHANGE UP (ref 8.4–10.5)
CHLORIDE SERPL-SCNC: 98 MMOL/L — SIGNIFICANT CHANGE UP (ref 96–108)
CHLORIDE SERPL-SCNC: 98 MMOL/L — SIGNIFICANT CHANGE UP (ref 96–108)
CO2 SERPL-SCNC: 24 MMOL/L — SIGNIFICANT CHANGE UP (ref 22–31)
CO2 SERPL-SCNC: 26 MMOL/L — SIGNIFICANT CHANGE UP (ref 22–31)
CREAT SERPL-MCNC: 1.02 MG/DL — SIGNIFICANT CHANGE UP (ref 0.5–1.3)
CREAT SERPL-MCNC: 1.14 MG/DL — SIGNIFICANT CHANGE UP (ref 0.5–1.3)
GLUCOSE SERPL-MCNC: 105 MG/DL — HIGH (ref 70–99)
GLUCOSE SERPL-MCNC: 110 MG/DL — HIGH (ref 70–99)
HCT VFR BLD CALC: 46.5 % — SIGNIFICANT CHANGE UP (ref 39–50)
HEV IGM SER QL: SIGNIFICANT CHANGE UP
HGB BLD-MCNC: 15.2 G/DL — SIGNIFICANT CHANGE UP (ref 13–17)
IGG SERPL-MCNC: 1480 MG/DL — SIGNIFICANT CHANGE UP (ref 767–1590)
IGG1 SER-MCNC: 838 MG/DL — SIGNIFICANT CHANGE UP (ref 341–894)
IGG2 SER-MCNC: 487 MG/DL — SIGNIFICANT CHANGE UP (ref 171–632)
IGG3 SER-MCNC: 53.6 MG/DL — SIGNIFICANT CHANGE UP (ref 18.4–106)
IGG4 SER-MCNC: 148 MG/DL — HIGH (ref 2.4–121)
MAGNESIUM SERPL-MCNC: 2.4 MG/DL — SIGNIFICANT CHANGE UP (ref 1.6–2.6)
MCHC RBC-ENTMCNC: 30.3 PG — SIGNIFICANT CHANGE UP (ref 27–34)
MCHC RBC-ENTMCNC: 32.7 GM/DL — SIGNIFICANT CHANGE UP (ref 32–36)
MCV RBC AUTO: 92.7 FL — SIGNIFICANT CHANGE UP (ref 80–100)
PHOSPHATE SERPL-MCNC: 3.6 MG/DL — SIGNIFICANT CHANGE UP (ref 2.5–4.5)
PLATELET # BLD AUTO: 203 K/UL — SIGNIFICANT CHANGE UP (ref 150–400)
POTASSIUM SERPL-MCNC: 3.9 MMOL/L — SIGNIFICANT CHANGE UP (ref 3.5–5.3)
POTASSIUM SERPL-MCNC: 4.3 MMOL/L — SIGNIFICANT CHANGE UP (ref 3.5–5.3)
POTASSIUM SERPL-SCNC: 3.9 MMOL/L — SIGNIFICANT CHANGE UP (ref 3.5–5.3)
POTASSIUM SERPL-SCNC: 4.3 MMOL/L — SIGNIFICANT CHANGE UP (ref 3.5–5.3)
PROT SERPL-MCNC: 7.5 G/DL — SIGNIFICANT CHANGE UP (ref 6–8.3)
PROT SERPL-MCNC: 7.9 G/DL — SIGNIFICANT CHANGE UP (ref 6–8.3)
RBC # BLD: 5.02 M/UL — SIGNIFICANT CHANGE UP (ref 4.2–5.8)
RBC # FLD: 12.7 % — SIGNIFICANT CHANGE UP (ref 10.3–14.5)
SODIUM SERPL-SCNC: 137 MMOL/L — SIGNIFICANT CHANGE UP (ref 135–145)
SODIUM SERPL-SCNC: 138 MMOL/L — SIGNIFICANT CHANGE UP (ref 135–145)
WBC # BLD: 7.2 K/UL — SIGNIFICANT CHANGE UP (ref 3.8–10.5)
WBC # FLD AUTO: 7.2 K/UL — SIGNIFICANT CHANGE UP (ref 3.8–10.5)

## 2017-07-22 PROCEDURE — 99232 SBSQ HOSP IP/OBS MODERATE 35: CPT | Mod: GC

## 2017-07-22 NOTE — PROGRESS NOTE ADULT - ASSESSMENT
33 y/o M w/ no sig PMHx p/w R epigastric pain x 3 days and dark urine, found to have US finding concerning for choledocolithiasis and acute cholecystitis. 33 y/o M w/ no sig PMHx p/w R epigastric pain x 3 days and dark urine, found to have US finding of choledocolithiasis

## 2017-07-22 NOTE — DISCHARGE NOTE ADULT - PLAN OF CARE
Treatment Work Up You came into the hospital with pain in your abdomen which was due to a stone that was found in your gallbladder and collecting system of your liver. You have had a procedure that has removed the stones. Please follow up with Dr. Trevizo from General Surgery 6 weeks upon leaving the hospital to discuss the possibility of having an elective removal of your gallbladder to prevent recurrent inflammation in the future. During your admission, blood work has shown abnormalities in your liver enzymes which were related to the stones that were causing an obstruction. We have been following your blood work daily and to see if the levels would come down after the stones were removed. During this admission, initial ultrasounds of your abdomen have shown a possible partial clot in your liver which was ruled out on subsequent imaging. However, further blood work revealed a possibility for an increased risk to clot which has to be worked up further. Please follow up with the hematologists 12 weeks upon leaving the hospital for repeat blood work.

## 2017-07-22 NOTE — DISCHARGE NOTE ADULT - HOSPITAL COURSE
29 year old male with no remarkable PMHx presents with RUQ pain, scleral icterus and asher colored urine for three days. 29 year old male with no remarkable PMHx presents with RUQ pain, scleral icterus and asher colored urine for three days. In the ED vitals were unremarkable. LFTs showed a Tbili of 9.3 with an elevated ALP and transaminitis. GI and Surgery were consulted. U/S Doppler Abdomen showed concern for possible thrombosis in the left and right portal vein branches, and heme onc was consulted for an underlying hypercoaguable pathology resulting in portal vein thrombosis. Hypercoaguable workup was ordered. MRCP performed had ruled out the portal vein thrombosis but had shown a stones with distal narrowing of the CBD. GI had performed an ERCP with stone removal. Surgery had seen the patient and recommend f/u in 6 wks for elective cholecystectomy. Hypercoag workup was remarkable for a positive Lupus Anticoagulant. Further coag testing sent with the recommendation to f/u with the hematologists 12 weeks from discharge for repeat blood work. 29 year old male with no remarkable PMHx presents with RUQ pain, scleral icterus and asher colored urine for three days. In the ED vitals were unremarkable. LFTs showed a Tbili of 9.3 with an elevated ALP and transaminitis. GI and Surgery were consulted. U/S Doppler Abdomen showed concern for possible thrombosis in the left and right portal vein branches, and heme onc was consulted for an underlying hypercoaguable pathology resulting in portal vein thrombosis. Hypercoaguable workup was ordered. MRCP performed had ruled out the portal vein thrombosis but had shown a stones with distal narrowing of the CBD. GI had performed an ERCP with stone removal. Surgery had seen the patient and recommend f/u in 6 wks for elective cholecystectomy. Hypercoag workup was remarkable for a positive Lupus Anticoagulant. Further coag testing sent with the recommendation to f/u with the hematologists 12 weeks from discharge for repeat blood work.    The patient continued to have persistently elevated LFTs despite ERCP stone removal, so the surgical service brought him to the OR for an open subtotal cholecystectomy that was performed on 8/1/17. The patient recovered well and his LFTs began downtrending appropriately. He was left with a JOHANNA drain in his right abdomen that was putting out a mild-moderate amount of serosanguinous fluid on the day of his discharge, 8/4/17. He was instructed to follow-up with Dr. Palacios 1 week after discharge, and to visit a [facility] lab 2 days prior to that appointment to have his LFTs checked. The patient was discharged on 8/4/17 in stable condition.

## 2017-07-22 NOTE — DISCHARGE NOTE ADULT - CARE PROVIDERS DIRECT ADDRESSES
[email or web address]

## 2017-07-22 NOTE — DISCHARGE NOTE ADULT - CARE PROVIDER_API CALL
Davion Palacios), Surgery; Surgical Critical Care  [address] [id number]  Phone: [phone]  Fax: [phone]

## 2017-07-22 NOTE — DISCHARGE NOTE ADULT - MEDICATION SUMMARY - MEDICATIONS TO TAKE
I will START or STAY ON the medications listed below when I get home from the hospital:    oxyCODONE 5 mg oral capsule  -- 1 cap(s) by mouth every 4 to 6 hours, As Needed -for moderate pain -for severe pain MDD:3 caps  -- Caution federal law prohibits the transfer of this drug to any person other  than the person for whom it was prescribed.  It is very important that you take or use this exactly as directed.  Do not skip doses or discontinue unless directed by your doctor.  May cause drowsiness.  Alcohol may intensify this effect.  Use care when operating dangerous machinery.  This prescription cannot be refilled.  Using more of this medication than prescribed may cause serious breathing problems.    -- Indication: For Pain med I will START or STAY ON the medications listed below when I get home from the hospital:    oxyCODONE 5 mg oral tablet  -- 1 tab(s) by mouth every 4 to 6 hours, As Needed -for moderate pain -for severe pain MDD:3 tabs  -- Caution federal law prohibits the transfer of this drug to any person other  than the person for whom it was prescribed.  It is very important that you take or use this exactly as directed.  Do not skip doses or discontinue unless directed by your doctor.  May cause drowsiness.  Alcohol may intensify this effect.  Use care when operating dangerous machinery.  This prescription cannot be refilled.  Using more of this medication than prescribed may cause serious breathing problems.    -- Indication: For Pain med

## 2017-07-22 NOTE — DISCHARGE NOTE ADULT - PATIENT PORTAL LINK FT
“You can access the Follow[employer] Patient Portal, offered by [facility], by registering with the following website: [email or web address]”

## 2017-07-22 NOTE — PROGRESS NOTE ADULT - SUBJECTIVE AND OBJECTIVE BOX
Patient is a 32y old  Male who presents with a chief complaint of R Epigastric pain (17 Jul 2017 19:44)        SUBJECTIVE / OVERNIGHT EVENTS:      MEDICATIONS  (STANDING):    MEDICATIONS  (PRN):        CAPILLARY BLOOD GLUCOSE        I&O's Summary    21 Jul 2017 07:01  -  22 Jul 2017 07:00  --------------------------------------------------------  IN: 360 mL / OUT: 0 mL / NET: 360 mL        PHYSICAL EXAM  GENERAL: NAD, well-developed  HEAD:  Atraumatic, Normocephalic  EYES: EOMI, PERRLA, conjunctiva and sclera clear  NECK: Supple, No JVD  CHEST/LUNG: Clear to auscultation bilaterally; No wheeze  HEART: Regular rate and rhythm; No murmurs, rubs, or gallops  ABDOMEN: Soft, Nontender, Nondistended; Bowel sounds present  EXTREMITIES:  2+ Peripheral Pulses, No clubbing, cyanosis, or edema  PSYCH: AAOx3  SKIN: No rashes or lesions    LABS:                        15.5   6.6   )-----------( 210      ( 21 Jul 2017 07:08 )             49.9     07-21    137  |  100  |  8   ----------------------------<  112<H>  4.0   |  22  |  0.90    Ca    10.3      21 Jul 2017 07:08  Phos  3.9     07-21  Mg     2.4     07-21    TPro  8.0  /  Alb  4.0  /  TBili  7.9<H>  /  DBili  x   /  AST  334<H>  /  ALT  948<H>  /  AlkPhos  250<H>  07-21              RADIOLOGY & ADDITIONAL TESTS:    Imaging Personally Reviewed:  Consultant(s) Notes Reviewed:    Care Discussed with Consultants/Other Providers: Patient is a 32y old  Male who presents with a chief complaint of R Epigastric pain (17 Jul 2017 19:44)    SUBJECTIVE / OVERNIGHT EVENTS: Seen at bedside today, reports no abd pain, fevers, chills, nausea or vomiting after the ERCP. Tolerating liquids well.      MEDICATIONS  (STANDING):    MEDICATIONS  (PRN):    CAPILLARY BLOOD GLUCOSE    I&O's Summary    21 Jul 2017 07:01  -  22 Jul 2017 07:00  --------------------------------------------------------  IN: 360 mL / OUT: 0 mL / NET: 360 mL    PHYSICAL EXAM  GENERAL: NAD, well-developed young male  EYES: EOMI, PERRLA, conjunctiva and sclera mildly icteric  CHEST/LUNG: Clear to auscultation bilaterally; No wheezes, rales or rhonchi  HEART: Regular rate and rhythm; No murmurs, rubs, or gallops  ABDOMEN: Soft, Nontender, Nondistended; Bowel sounds present  EXTREMITIES:  2+ Peripheral Pulses, No clubbing, cyanosis, or edema    LABS:                        15.2   7.2   )-----------( 203      ( 22 Jul 2017 07:06 )             46.5     07-22    137  |  98  |  8   ----------------------------<  110<H>  3.9   |  26  |  1.02    Ca    10.0      22 Jul 2017 07:06  Phos  3.6     07-22  Mg     2.4     07-22    TPro  7.5  /  Alb  3.9  /  TBili  8.3<H>  /  DBili  x   /  AST  277<H>  /  ALT  820<H>  /  AlkPhos  245<H>  07-22    LIVER FUNCTIONS - ( 22 Jul 2017 07:06 )  Alb: 3.9 g/dL / Pro: 7.5 g/dL / ALK PHOS: 245 U/L / ALT: 820 U/L RC / AST: 277 U/L / GGT: x           RADIOLOGY & ADDITIONAL TESTS:  No new imaging Patient is a 32y old  Male who presents with a chief complaint of R Epigastric pain (17 Jul 2017 19:44)    SUBJECTIVE / OVERNIGHT EVENTS: Seen at bedside today, reports no abd pain, fevers, chills, nausea or vomiting after the ERCP. Tolerating liquids well.      MEDICATIONS  (STANDING):    MEDICATIONS  (PRN):    CAPILLARY BLOOD GLUCOSE    I&O's Summary    21 Jul 2017 07:01  -  22 Jul 2017 07:00  --------------------------------------------------------  IN: 360 mL / OUT: 0 mL / NET: 360 mL    PHYSICAL EXAM  GENERAL: NAD, well-developed young male  EYES: EOMI, PERRLA, conjunctiva and sclera mildly icteric  CHEST/LUNG: Clear to auscultation bilaterally; No wheezes, rales or rhonchi  HEART: Regular rate and rhythm; No murmurs, rubs, or gallops  ABDOMEN: Soft, Nontender, Nondistended; Bowel sounds present  EXTREMITIES:  2+ Peripheral Pulses, No clubbing, cyanosis, or edema    LABS:                        15.2   7.2   )-----------( 203      ( 22 Jul 2017 07:06 )             46.5     07-22    137  |  98  |  8   ----------------------------<  110<H>  3.9   |  26  |  1.02    Ca    10.0      22 Jul 2017 07:06  Phos  3.6     07-22  Mg     2.4     07-22    TPro  7.5  /  Alb  3.9  /  TBili  8.3<H>  /  DBili  x   /  AST  277<H>  /  ALT  820<H>  /  AlkPhos  245<H>  07-22    LIVER FUNCTIONS - ( 22 Jul 2017 07:06 )  Alb: 3.9 g/dL / Pro: 7.5 g/dL / ALK PHOS: 245 U/L / ALT: 820 U/L RC / AST: 277 U/L / GGT: x           RADIOLOGY & ADDITIONAL TESTS:  No new imaging    Consult note reviewed: G.I. ---> advance diet, monitor liver labs, possible liver biopsy

## 2017-07-22 NOTE — PROGRESS NOTE ADULT - PROBLEM SELECTOR PLAN 1
- MRCP showed narrowing and calculus of the CBD, no evidence of portal vein thrombosis  - taken for ERCP today, will await GI recs  - Surgery (Dr. rTevizo) will f/u w/ patient 6 weeks from discharge for discussion of elective cholecystectomy - s/p ERCP, trending LFTs Tbili is still high at 8.3, will advance diet and continue to await GI recs, likely will go for liver bx   - Surgery (Dr. Trevizo) will f/u w/ patient 6 weeks from discharge for discussion of elective cholecystectomy - s/p ERCP, trending LFTs Tbili is still high at 8.3, will advance diet and continue to await GI recs, likely will go for liver bx if no improvement of liver enzymes and bilirubin  - Surgery (Dr. Trevizo) will f/u w/ patient 6 weeks from discharge for discussion of elective cholecystectomy

## 2017-07-22 NOTE — PROGRESS NOTE ADULT - ASSESSMENT
IMP:    Choledocholithiasis s/p ERCP with extraction of stones/sludge by sphincterotomy and balloon sweeps. Clinically stable. LFTs remain elevated.     PLAN:  - advance to low fat diet  - trend LFTs  - if no improvement in LFTs over the next 24-48 hours, will need to consider liver biopsy for further evaluation given degree of elevation in LFTs unusual to be caused by small CBD stone/sludge extracted during ERCP.

## 2017-07-23 LAB
ALBUMIN SERPL ELPH-MCNC: 4.1 G/DL — SIGNIFICANT CHANGE UP (ref 3.3–5)
ALP SERPL-CCNC: 266 U/L — HIGH (ref 40–120)
ALT FLD-CCNC: 817 U/L RC — HIGH (ref 10–45)
ANION GAP SERPL CALC-SCNC: 14 MMOL/L — SIGNIFICANT CHANGE UP (ref 5–17)
AST SERPL-CCNC: 286 U/L — HIGH (ref 10–40)
BILIRUB SERPL-MCNC: 9.3 MG/DL — HIGH (ref 0.2–1.2)
BUN SERPL-MCNC: 14 MG/DL — SIGNIFICANT CHANGE UP (ref 7–23)
CALCIUM SERPL-MCNC: 10.2 MG/DL — SIGNIFICANT CHANGE UP (ref 8.4–10.5)
CHLORIDE SERPL-SCNC: 98 MMOL/L — SIGNIFICANT CHANGE UP (ref 96–108)
CO2 SERPL-SCNC: 24 MMOL/L — SIGNIFICANT CHANGE UP (ref 22–31)
CREAT SERPL-MCNC: 1 MG/DL — SIGNIFICANT CHANGE UP (ref 0.5–1.3)
GLUCOSE SERPL-MCNC: 112 MG/DL — HIGH (ref 70–99)
HCT VFR BLD CALC: 48.9 % — SIGNIFICANT CHANGE UP (ref 39–50)
HGB BLD-MCNC: 15.7 G/DL — SIGNIFICANT CHANGE UP (ref 13–17)
MAGNESIUM SERPL-MCNC: 2.2 MG/DL — SIGNIFICANT CHANGE UP (ref 1.6–2.6)
MCHC RBC-ENTMCNC: 29.7 PG — SIGNIFICANT CHANGE UP (ref 27–34)
MCHC RBC-ENTMCNC: 32.2 GM/DL — SIGNIFICANT CHANGE UP (ref 32–36)
MCV RBC AUTO: 92.3 FL — SIGNIFICANT CHANGE UP (ref 80–100)
PHOSPHATE SERPL-MCNC: 3.9 MG/DL — SIGNIFICANT CHANGE UP (ref 2.5–4.5)
PLATELET # BLD AUTO: 224 K/UL — SIGNIFICANT CHANGE UP (ref 150–400)
POTASSIUM SERPL-MCNC: 3.9 MMOL/L — SIGNIFICANT CHANGE UP (ref 3.5–5.3)
POTASSIUM SERPL-SCNC: 3.9 MMOL/L — SIGNIFICANT CHANGE UP (ref 3.5–5.3)
PROT SERPL-MCNC: 8 G/DL — SIGNIFICANT CHANGE UP (ref 6–8.3)
RBC # BLD: 5.29 M/UL — SIGNIFICANT CHANGE UP (ref 4.2–5.8)
RBC # FLD: 12.9 % — SIGNIFICANT CHANGE UP (ref 10.3–14.5)
SODIUM SERPL-SCNC: 136 MMOL/L — SIGNIFICANT CHANGE UP (ref 135–145)
WBC # BLD: 7.6 K/UL — SIGNIFICANT CHANGE UP (ref 3.8–10.5)
WBC # FLD AUTO: 7.6 K/UL — SIGNIFICANT CHANGE UP (ref 3.8–10.5)

## 2017-07-23 PROCEDURE — 99232 SBSQ HOSP IP/OBS MODERATE 35: CPT | Mod: GC

## 2017-07-23 NOTE — PROGRESS NOTE ADULT - ATTENDING COMMENTS
the patient was seen and examined by me with the resident on July 23rd during the morning rounds. Because of technical difficulty I was not able to sign the note at that time but I am able to do so at this time. the patient was seen and examined by me with the resident on July 23rd during the morning rounds. Because of computer error I was not able to sign the note on July 23rd but I am able to do so at this time.

## 2017-07-23 NOTE — PROGRESS NOTE ADULT - SUBJECTIVE AND OBJECTIVE BOX
Patient is a 32y old  Male who presents with a chief complaint of R Epigastric pain (22 Jul 2017 17:38)      SUBJECTIVE / OVERNIGHT EVENTS:  No acute overnight events. Patient tolerated diet well. No N/V. Last BM yesterday. No abdominal pain, fevers, or chills.      MEDICATIONS  (STANDING):    MEDICATIONS  (PRN):    Vital Signs Last 24 Hrs  T(C): 36.6 (23 Jul 2017 13:52), Max: 36.8 (23 Jul 2017 04:33)  T(F): 97.9 (23 Jul 2017 13:52), Max: 98.2 (23 Jul 2017 04:33)  HR: 75 (23 Jul 2017 13:52) (69 - 82)  BP: 132/78 (23 Jul 2017 13:52) (132/78 - 139/85)  BP(mean): --  RR: 18 (23 Jul 2017 13:52) (16 - 18)  SpO2: 97% (23 Jul 2017 13:52) (97% - 97%)    CAPILLARY BLOOD GLUCOSE        I&O's Summary    22 Jul 2017 07:01  -  23 Jul 2017 07:00  --------------------------------------------------------  IN: 1200 mL / OUT: 0 mL / NET: 1200 mL        PHYSICAL EXAM:  GENERAL: NAD, well-developed  HEAD:  Atraumatic  EYES: EOMI, PERRL, conjunctiva and sclera clear  NECK: Supple, No JVD  CHEST/LUNG: Clear to auscultation bilaterally; No wheezes, rales, or rhonchi  HEART: Regular rate and rhythm; No murmurs, rubs, or gallops  ABDOMEN: Soft, Nontender, Nondistended; Bowel sounds present  EXTREMITIES:  2+ Peripheral Pulses, No clubbing, cyanosis, or edema  NEUROLOGY: A&O x3  SKIN: No rashes or lesions    LABS:                        15.7   7.6   )-----------( 224      ( 23 Jul 2017 07:12 )             48.9     07-23    136  |  98  |  14  ----------------------------<  112<H>  3.9   |  24  |  1.00    Ca    10.2      23 Jul 2017 07:12  Phos  3.9     07-23  Mg     2.2     07-23    TPro  8.0  /  Alb  4.1  /  TBili  9.3<H>  /  DBili  x   /  AST  286<H>  /  ALT  817<H>  /  AlkPhos  266<H>  07-23              RADIOLOGY & ADDITIONAL TESTS:    Imaging Personally Reviewed: none    Consultant(s) Notes Reviewed:  GI    Care Discussed with Consultants/Other Providers: none

## 2017-07-23 NOTE — PROGRESS NOTE ADULT - PROBLEM SELECTOR PLAN 3
- Prior concern of portal vein thrombosis on U/S, however no evidence of thrombosis on MRI imaging, heparin discontinued  - Lupus anticoag + with elevated AT3  - per heme, will need to be off anticoagulation and will need to f/u with heme 12 weeks from discharge for repeat blood work

## 2017-07-23 NOTE — PROGRESS NOTE ADULT - PROBLEM SELECTOR PLAN 4
- DVT: ambulating, low risk  - Diet: low fat. Will h/o on making NPO (likely only need to be 4 hrs prior to bx) until have GI recs  - Dispo: pending ?liver bx

## 2017-07-23 NOTE — PROGRESS NOTE ADULT - ASSESSMENT
31 y/o M w/ no sig PMHx p/w R epigastric pain x 3 days and dark urine, found to have US finding of choledocholithiasis

## 2017-07-23 NOTE — PROGRESS NOTE ADULT - PROBLEM SELECTOR PLAN 2
- S/p ERCP Fri  - Hep panel unremarkable  - With elevated Tbili, will trend LFTs, if still high tomorrow, will f/u GI for consideration of liver bx

## 2017-07-23 NOTE — PROGRESS NOTE ADULT - PROBLEM SELECTOR PLAN 1
- s/p ERCP, trending LFTs. Tbili is still high at 9.3, which is unusual after ERCP. Will obtain dbili tomorrow  - Surgery (Dr. Trevizo) will f/u w/ patient 6 weeks from discharge for discussion of elective cholecystectomy

## 2017-07-24 LAB
ALBUMIN SERPL ELPH-MCNC: 4.2 G/DL — SIGNIFICANT CHANGE UP (ref 3.3–5)
ALP SERPL-CCNC: 287 U/L — HIGH (ref 40–120)
ALT FLD-CCNC: 762 U/L RC — HIGH (ref 10–45)
ANION GAP SERPL CALC-SCNC: 16 MMOL/L — SIGNIFICANT CHANGE UP (ref 5–17)
APTT BLD: 40.3 SEC — HIGH (ref 27.5–37.4)
AST SERPL-CCNC: 281 U/L — HIGH (ref 10–40)
BILIRUB DIRECT SERPL-MCNC: 8 MG/DL — HIGH (ref 0–0.2)
BILIRUB SERPL-MCNC: 10.9 MG/DL — HIGH (ref 0.2–1.2)
BUN SERPL-MCNC: 15 MG/DL — SIGNIFICANT CHANGE UP (ref 7–23)
CALCIUM SERPL-MCNC: 10.5 MG/DL — SIGNIFICANT CHANGE UP (ref 8.4–10.5)
CHLORIDE SERPL-SCNC: 99 MMOL/L — SIGNIFICANT CHANGE UP (ref 96–108)
CO2 SERPL-SCNC: 24 MMOL/L — SIGNIFICANT CHANGE UP (ref 22–31)
CREAT SERPL-MCNC: 0.99 MG/DL — SIGNIFICANT CHANGE UP (ref 0.5–1.3)
DRVVT SCREEN TO CONFIRM RATIO: SIGNIFICANT CHANGE UP
GLUCOSE SERPL-MCNC: 131 MG/DL — HIGH (ref 70–99)
INR BLD: 1.06 RATIO — SIGNIFICANT CHANGE UP (ref 0.88–1.16)
LA NT DPL PPP QL: 30.9 SEC — SIGNIFICANT CHANGE UP
MAGNESIUM SERPL-MCNC: 2.3 MG/DL — SIGNIFICANT CHANGE UP (ref 1.6–2.6)
NORMALIZED SCT PPP-RTO: 1.25 RATIO — HIGH (ref 0–1.16)
NORMALIZED SCT PPP-RTO: ABNORMAL
PHOSPHATE SERPL-MCNC: 4 MG/DL — SIGNIFICANT CHANGE UP (ref 2.5–4.5)
POTASSIUM SERPL-MCNC: 4.1 MMOL/L — SIGNIFICANT CHANGE UP (ref 3.5–5.3)
POTASSIUM SERPL-SCNC: 4.1 MMOL/L — SIGNIFICANT CHANGE UP (ref 3.5–5.3)
PROT SERPL-MCNC: 8.1 G/DL — SIGNIFICANT CHANGE UP (ref 6–8.3)
PROTHROM AB SERPL-ACNC: 11.6 SEC — SIGNIFICANT CHANGE UP (ref 9.8–12.7)
SODIUM SERPL-SCNC: 139 MMOL/L — SIGNIFICANT CHANGE UP (ref 135–145)

## 2017-07-24 PROCEDURE — 76700 US EXAM ABDOM COMPLETE: CPT | Mod: 26

## 2017-07-24 PROCEDURE — 99232 SBSQ HOSP IP/OBS MODERATE 35: CPT | Mod: GC

## 2017-07-24 RX ORDER — SODIUM CHLORIDE 9 MG/ML
1000 INJECTION INTRAMUSCULAR; INTRAVENOUS; SUBCUTANEOUS
Qty: 0 | Refills: 0 | Status: DISCONTINUED | OUTPATIENT
Start: 2017-07-24 | End: 2017-07-28

## 2017-07-24 RX ADMIN — SODIUM CHLORIDE 75 MILLILITER(S): 9 INJECTION INTRAMUSCULAR; INTRAVENOUS; SUBCUTANEOUS at 13:47

## 2017-07-24 NOTE — PROGRESS NOTE ADULT - PROBLEM SELECTOR PLAN 1
- s/p ERCP, trending LFTs (still elevated). Tbili and alk phos both still high and uptrending, which is unusual after ERCP. Direct bili elevated to 8.  - Surgery (Dr. Trevizo) will f/u w/ patient 6 weeks from discharge for discussion of elective cholecystectomy - s/p ERCP, trending LFTs (still elevated). Tbili and alk phos both still high and uptrending, which is unusual after ERCP. Direct bili elevated to 8.  - Surgery (Dr. Trevizo) will f/u w/ patient 6 weeks from discharge for discussion of elective cholecystectomy  - gave IVF @ 75 for 10 hrs to remain proper hydration - s/p ERCP, trending LFTs (still elevated). Tbili and alk phos both still high and uptrending, which is unusual after ERCP. Direct bili elevated to 8.  - Surgery (Dr. Trevizo) will f/u w/ patient 6 weeks from discharge for discussion of elective cholecystectomy  - gave IVF @ 75 for 10 hrs to maintain proper hydration since pt was NPO in the AM

## 2017-07-24 NOTE — PROGRESS NOTE ADULT - PROBLEM SELECTOR PLAN 2
- S/p ERCP Fri  - Hep panel unremarkable  - US done shows Hepatomegaly and hepatic steatosis, Intrahepatic and extrahepatic biliary ductal dilatation, No CBD stone is evident, Cholelithiasis and sludge - based on these results waiting on Gastroenterology to determine if they would like to repeat ERCP or do possible biopsy

## 2017-07-24 NOTE — PROGRESS NOTE ADULT - ASSESSMENT
Choledocholithiasis s/p ERCP with extraction of stones/sludge by sphincterotomy and balloon sweeps 3 days ago, bilirubin and alk phos continuing to uptrend since procedure and repeat abd us now showing increasing CBD dilation.     PLAN:  -still concerned for choledocolithiasis given worsening bilirubin and enlarged CBD.  -will speak to advanced endoscopy team regarding possible repeat ERCP to evaluate.   -no plans for liver biopsy at this moment.   -pt will require elective CCY as an outpatient.   -continue to trend LFT's and INR.

## 2017-07-24 NOTE — PROGRESS NOTE ADULT - SUBJECTIVE AND OBJECTIVE BOX
Chief Complaint:  Patient is a 32y old  Male who presents with a chief complaint of R Epigastric pain (22 Jul 2017 17:38)      Interval Events:   Over the weekend patient did not have return of abdominal pain. He has been tolerating meals well and has no current complaints.     Allergies:  No Known Allergies      Hospital Medications:  sodium chloride 0.9%. 1000 milliLiter(s) IV Continuous <Continuous>      PMHX/PSHX:  Eczema, unspecified type  No pertinent past medical history  No significant past surgical history      Family history:  Family history of gallbladder disease (Father, Grandparent)  Family history of coronary artery disease in mother (Mother)      ROS:     General:  No wt loss, fevers, chills, night sweats, fatigue,   Eyes:  Good vision, no reported pain  ENT:  No sore throat, pain, runny nose, dysphagia  CV:  No pain, palpitations, hypo/hypertension  Resp:  No dyspnea, cough, tachypnea, wheezing  GI:  See HPI  :  No pain, bleeding, incontinence, nocturia  Muscle:  No pain, weakness  Neuro:  No weakness, tingling, memory problems  Psych:  No fatigue, insomnia, mood problems, depression  Endocrine:  No polyuria, polydipsia, cold/heat intolerance  Heme:  No petechiae, ecchymosis, easy bruisability  Skin:  No rash, edema      PHYSICAL EXAM:     GENERAL:  Appears stated age, well-groomed, well-nourished, no distress  HEENT:  NC/AT,  conjunctivae clear, scleral icterus.   CHEST:  Full & symmetric excursion, no increased effort, breath sounds clear  HEART:  Regular rhythm, S1, S2, no murmur/rub/S3/S4,  no edema  ABDOMEN:  Soft, non-tender, non-distended, normoactive bowel sounds,  no masses ,no hepato-splenomegaly,   EXTREMITIES:  no cyanosis,clubbing or edema  SKIN:  +Jaundice  No rash/erythema/ecchymoses/petechiae/wounds/abscess/warm/dry  NEURO:  Alert, oriented    Vital Signs:  Vital Signs Last 24 Hrs  T(C): 36.6 (24 Jul 2017 13:05), Max: 36.7 (23 Jul 2017 21:04)  T(F): 97.8 (24 Jul 2017 13:05), Max: 98 (23 Jul 2017 21:04)  HR: 76 (24 Jul 2017 13:05) (75 - 87)  BP: 122/75 (24 Jul 2017 13:05) (118/77 - 132/78)  BP(mean): --  RR: 18 (24 Jul 2017 13:05) (18 - 18)  SpO2: 97% (24 Jul 2017 13:05) (95% - 97%)  Daily     Daily     LABS:                        15.7   7.6   )-----------( 224      ( 23 Jul 2017 07:12 )             48.9     07-24    139  |  99  |  15  ----------------------------<  131<H>  4.1   |  24  |  0.99    Ca    10.5      24 Jul 2017 06:28  Phos  4.0     07-24  Mg     2.3     07-24    TPro  8.1  /  Alb  4.2  /  TBili  10.9<H>  /  DBili  8.0<H>  /  AST  281<H>  /  ALT  762<H>  /  AlkPhos  287<H>  07-24    LIVER FUNCTIONS - ( 24 Jul 2017 06:28 )  Alb: 4.2 g/dL / Pro: 8.1 g/dL / ALK PHOS: 287 U/L / ALT: 762 U/L RC / AST: 281 U/L / GGT: x           PT/INR - ( 24 Jul 2017 11:11 )   PT: 11.6 sec;   INR: 1.06 ratio         PTT - ( 24 Jul 2017 11:11 )  PTT:40.3 sec        Imaging:    < from: US Abdomen Complete (07.24.17 @ 12:08) >  FINDINGS:    Liver: Increased echogenicity, compatible with steatosis. Focal fatty   sparing adjacent to the gallbladder fossa. Enlarged.    Bile ducts: Intrahepatic and extrahepaticductal dilatation. Common bile   duct measures 13 mm. No CBD stone is visible.    Gallbladder: Distended. There is cholelithiasis and sludge. No   gallbladder wall thickening.    Pancreas: Tail obscured. Visualized portions are within normal limits.    Spleen: 12.0 cm. Within normal limits.    Right kidney: 10.6 cm. No hydronephrosis.        Left kidney: 11.8 cm.  No hydronephrosis.        Ascites: None.    Aorta and IVC: Visualized portions are within normal limits.    IMPRESSION:  Hepatomegaly and hepatic steatosis.    Intrahepatic and extrahepatic biliary ductal dilatation. No CBD stone is   evident.    Cholelithiasis and sludge.    < end of copied text >

## 2017-07-24 NOTE — PROGRESS NOTE ADULT - SUBJECTIVE AND OBJECTIVE BOX
Subjective:  No acute events overnight.  Currently in no pain or discomfort.    VITAL SIGNS:  Vital Signs Last 24 Hrs  T(C): 36.6 (24 Jul 2017 13:05), Max: 36.7 (23 Jul 2017 21:04)  T(F): 97.8 (24 Jul 2017 13:05), Max: 98 (23 Jul 2017 21:04)  HR: 76 (24 Jul 2017 13:05) (76 - 87)  BP: 122/75 (24 Jul 2017 13:05) (118/77 - 123/73)  BP(mean): --  RR: 18 (24 Jul 2017 13:05) (18 - 18)  SpO2: 97% (24 Jul 2017 13:05) (95% - 97%)      PHYSICAL EXAM:  GENERAL: NAD, well-developed  HEAD:  Atraumatic  EYES: EOMI, PERRL, scleral icterus present  NECK: Supple, No JVD  CHEST/LUNG: Clear to auscultation bilaterally; No wheezes, rales, or rhonchi  HEART: Regular rate and rhythm; No murmurs, rubs, or gallops  ABDOMEN: Soft, Nontender, Nondistended; Bowel sounds present  EXTREMITIES:  2+ Peripheral Pulses, No clubbing, cyanosis, or edema  NEUROLOGY: A&O x3  SKIN: No rashes or lesions                          15.7   7.6   )-----------( 224      ( 23 Jul 2017 07:12 )             48.9     07-24    139  |  99  |  15  ----------------------------<  131<H>  4.1   |  24  |  0.99    Ca    10.5      24 Jul 2017 06:28  Phos  4.0     07-24  Mg     2.3     07-24    TPro  8.1  /  Alb  4.2  /  TBili  10.9<H>  /  DBili  8.0<H>  /  AST  281<H>  /  ALT  762<H>  /  AlkPhos  287<H>  07-24      CAPILLARY BLOOD GLUCOSE          MEDICATIONS  (STANDING):  sodium chloride 0.9%. 1000 milliLiter(s) (75 mL/Hr) IV Continuous <Continuous> Subjective:  No acute events overnight.  Currently in no pain or discomfort.    VITAL SIGNS:  Vital Signs Last 24 Hrs  T(C): 36.6 (24 Jul 2017 13:05), Max: 36.7 (23 Jul 2017 21:04)  T(F): 97.8 (24 Jul 2017 13:05), Max: 98 (23 Jul 2017 21:04)  HR: 76 (24 Jul 2017 13:05) (76 - 87)  BP: 122/75 (24 Jul 2017 13:05) (118/77 - 123/73)  BP(mean): --  RR: 18 (24 Jul 2017 13:05) (18 - 18)  SpO2: 97% (24 Jul 2017 13:05) (95% - 97%)      PHYSICAL EXAM:  GENERAL: NAD, well-developed  HEAD:  Atraumatic  EYES: EOMI, PERRL, scleral icterus present  NECK: Supple, No JVD  CHEST/LUNG: Clear to auscultation bilaterally; No wheezes, rales, or rhonchi  HEART: Regular rate and rhythm; No murmurs, rubs, or gallops  ABDOMEN: Soft, Nontender, Nondistended; Bowel sounds present  EXTREMITIES:  2+ Peripheral Pulses, No clubbing, cyanosis, or edema  NEUROLOGY: A&O x3  SKIN: No rashes or lesions                          15.7   7.6   )-----------( 224      ( 23 Jul 2017 07:12 )             48.9     07-24    139  |  99  |  15  ----------------------------<  131<H>  4.1   |  24  |  0.99    Ca    10.5      24 Jul 2017 06:28  Phos  4.0     07-24  Mg     2.3     07-24    TPro  8.1  /  Alb  4.2  /  TBili  10.9<H>  /  DBili  8.0<H>  /  AST  281<H>  /  ALT  762<H>  /  AlkPhos  287<H>  07-24      CAPILLARY BLOOD GLUCOSE          MEDICATIONS  (STANDING):  sodium chloride 0.9%. 1000 milliLiter(s) (75 mL/Hr) IV Continuous <Continuous>    imaging reviewed: < from: US Abdomen Complete (07.24.17 @ 12:08) >  FINDINGS:    Liver: Increased echogenicity, compatible with steatosis. Focal fatty   sparing adjacent to the gallbladder fossa. Enlarged.    Bile ducts: Intrahepatic and extrahepaticductal dilatation. Common bile   duct measures 13 mm. No CBD stone is visible.    Gallbladder: Distended. There is cholelithiasis and sludge. No   gallbladder wall thickening.    Pancreas: Tail obscured. Visualized portions are within normal limits.    Spleen: 12.0 cm. Within normal limits.    Right kidney: 10.6 cm. No hydronephrosis.        Left kidney: 11.8 cm.  No hydronephrosis.        Ascites: None.    Aorta and IVC: Visualized portions are within normal limits.    IMPRESSION:  Hepatomegaly and hepatic steatosis.    Intrahepatic and extrahepatic biliary ductal dilatation. No CBD stone is   evident.    Cholelithiasis and sludge.      < end of copied text > Subjective:  No acute events overnight.  Currently in no pain or discomfort.    VITAL SIGNS:  Vital Signs Last 24 Hrs  T(C): 36.6 (24 Jul 2017 13:05), Max: 36.7 (23 Jul 2017 21:04)  T(F): 97.8 (24 Jul 2017 13:05), Max: 98 (23 Jul 2017 21:04)  HR: 76 (24 Jul 2017 13:05) (76 - 87)  BP: 122/75 (24 Jul 2017 13:05) (118/77 - 123/73)  BP(mean): --  RR: 18 (24 Jul 2017 13:05) (18 - 18)  SpO2: 97% (24 Jul 2017 13:05) (95% - 97%)      PHYSICAL EXAM:  GENERAL: NAD, well-developed  HEAD:  Atraumatic  EYES: EOMI, PERRL, scleral icterus present  NECK: Supple, No JVD  CHEST/LUNG: Clear to auscultation bilaterally; No wheezes, rales, or rhonchi  HEART: Regular rate and rhythm; No murmurs, rubs, or gallops  ABDOMEN: Soft, Nontender, Nondistended; Bowel sounds present  EXTREMITIES:  2+ Peripheral Pulses, No clubbing, cyanosis, or edema  NEUROLOGY: A&O x3  SKIN: eczema                          15.7   7.6   )-----------( 224      ( 23 Jul 2017 07:12 )             48.9     07-24    139  |  99  |  15  ----------------------------<  131<H>  4.1   |  24  |  0.99    Ca    10.5      24 Jul 2017 06:28  Phos  4.0     07-24  Mg     2.3     07-24    TPro  8.1  /  Alb  4.2  /  TBili  10.9<H>  /  DBili  8.0<H>  /  AST  281<H>  /  ALT  762<H>  /  AlkPhos  287<H>  07-24      CAPILLARY BLOOD GLUCOSE          MEDICATIONS  (STANDING):  sodium chloride 0.9%. 1000 milliLiter(s) (75 mL/Hr) IV Continuous <Continuous>    imaging reviewed: < from: US Abdomen Complete (07.24.17 @ 12:08) >  FINDINGS:    Liver: Increased echogenicity, compatible with steatosis. Focal fatty   sparing adjacent to the gallbladder fossa. Enlarged.    Bile ducts: Intrahepatic and extrahepaticductal dilatation. Common bile   duct measures 13 mm. No CBD stone is visible.    Gallbladder: Distended. There is cholelithiasis and sludge. No   gallbladder wall thickening.    Pancreas: Tail obscured. Visualized portions are within normal limits.    Spleen: 12.0 cm. Within normal limits.    Right kidney: 10.6 cm. No hydronephrosis.        Left kidney: 11.8 cm.  No hydronephrosis.        Ascites: None.    Aorta and IVC: Visualized portions are within normal limits.    IMPRESSION:  Hepatomegaly and hepatic steatosis.    Intrahepatic and extrahepatic biliary ductal dilatation. No CBD stone is   evident.    Cholelithiasis and sludge.      < end of copied text >

## 2017-07-24 NOTE — PROGRESS NOTE ADULT - ASSESSMENT
31 y/o M w/ no sig PMHx p/w R epigastric pain x 3 days and dark urine, found to have US finding of choledocholithiasis.

## 2017-07-25 LAB
ALBUMIN SERPL ELPH-MCNC: 3.9 G/DL — SIGNIFICANT CHANGE UP (ref 3.3–5)
ALP SERPL-CCNC: 293 U/L — HIGH (ref 40–120)
ALT FLD-CCNC: 627 U/L RC — HIGH (ref 10–45)
ANION GAP SERPL CALC-SCNC: 14 MMOL/L — SIGNIFICANT CHANGE UP (ref 5–17)
APTT BLD: 35.4 SEC — SIGNIFICANT CHANGE UP (ref 27.5–37.4)
AST SERPL-CCNC: 206 U/L — HIGH (ref 10–40)
BILIRUB SERPL-MCNC: 10.6 MG/DL — HIGH (ref 0.2–1.2)
BUN SERPL-MCNC: 21 MG/DL — SIGNIFICANT CHANGE UP (ref 7–23)
CALCIUM SERPL-MCNC: 10 MG/DL — SIGNIFICANT CHANGE UP (ref 8.4–10.5)
CHLORIDE SERPL-SCNC: 97 MMOL/L — SIGNIFICANT CHANGE UP (ref 96–108)
CO2 SERPL-SCNC: 25 MMOL/L — SIGNIFICANT CHANGE UP (ref 22–31)
CREAT SERPL-MCNC: 1.03 MG/DL — SIGNIFICANT CHANGE UP (ref 0.5–1.3)
GLUCOSE SERPL-MCNC: 111 MG/DL — HIGH (ref 70–99)
INR BLD: 1 RATIO — SIGNIFICANT CHANGE UP (ref 0.88–1.16)
POTASSIUM SERPL-MCNC: 3.9 MMOL/L — SIGNIFICANT CHANGE UP (ref 3.5–5.3)
POTASSIUM SERPL-SCNC: 3.9 MMOL/L — SIGNIFICANT CHANGE UP (ref 3.5–5.3)
PROT SERPL-MCNC: 7.7 G/DL — SIGNIFICANT CHANGE UP (ref 6–8.3)
PROTHROM AB SERPL-ACNC: 10.9 SEC — SIGNIFICANT CHANGE UP (ref 9.8–12.7)
SODIUM SERPL-SCNC: 136 MMOL/L — SIGNIFICANT CHANGE UP (ref 135–145)

## 2017-07-25 PROCEDURE — 99232 SBSQ HOSP IP/OBS MODERATE 35: CPT | Mod: GC

## 2017-07-25 NOTE — PROGRESS NOTE ADULT - SUBJECTIVE AND OBJECTIVE BOX
Chief Complaint:  Patient is a 32y old  Male who presents with a chief complaint of R Epigastric pain (22 Jul 2017 17:38)      Interval Events:   No overnight events. Pt denies any abdominal pain, nausea, vomiting, or diarrhea.     Allergies:  No Known Allergies      Hospital Medications:  sodium chloride 0.9%. 1000 milliLiter(s) IV Continuous <Continuous>      PMHX/PSHX:  Eczema, unspecified type  No pertinent past medical history  No significant past surgical history      Family history:  Family history of gallbladder disease (Father, Grandparent)  Family history of coronary artery disease in mother (Mother)      ROS:     General:  No wt loss, fevers, chills, night sweats, fatigue,   Eyes:  Good vision, no reported pain  ENT:  No sore throat, pain, runny nose, dysphagia  CV:  No pain, palpitations, hypo/hypertension  Resp:  No dyspnea, cough, tachypnea, wheezing  GI:  See HPI  :  No pain, bleeding, incontinence, nocturia  Muscle:  No pain, weakness  Neuro:  No weakness, tingling, memory problems  Psych:  No fatigue, insomnia, mood problems, depression  Endocrine:  No polyuria, polydipsia, cold/heat intolerance  Heme:  No petechiae, ecchymosis, easy bruisability  Skin:  No rash, edema      PHYSICAL EXAM:     GENERAL:  Appears stated age, well-groomed, well-nourished, no distress  HEENT:  NC/AT,  conjunctivae clear, sclera -anicteric  CHEST:  Full & symmetric excursion, no increased effort, breath sounds clear  HEART:  Regular rhythm, S1, S2, no murmur/rub/S3/S4,  no edema  ABDOMEN:  Soft, non-tender, non-distended, normoactive bowel sounds,  no masses ,no hepato-splenomegaly,   EXTREMITIES:  no cyanosis,clubbing or edema  SKIN:  No rash/erythema/ecchymoses/petechiae/wounds/abscess/warm/dry  NEURO:  Alert, oriented    Vital Signs:  Vital Signs Last 24 Hrs  T(C): 36.8 (25 Jul 2017 04:51), Max: 36.8 (25 Jul 2017 04:51)  T(F): 98.2 (25 Jul 2017 04:51), Max: 98.2 (25 Jul 2017 04:51)  HR: 76 (25 Jul 2017 04:51) (76 - 99)  BP: 129/80 (25 Jul 2017 04:51) (129/80 - 137/82)  BP(mean): --  RR: 18 (25 Jul 2017 04:51) (18 - 18)  SpO2: 96% (25 Jul 2017 04:51) (96% - 96%)  Daily     Daily     LABS:    07-25    136  |  97  |  21  ----------------------------<  111<H>  3.9   |  25  |  1.03    Ca    10.0      25 Jul 2017 07:44  Phos  4.0     07-24  Mg     2.3     07-24    TPro  7.7  /  Alb  3.9  /  TBili  10.6<H>  /  DBili  x   /  AST  206<H>  /  ALT  627<H>  /  AlkPhos  293<H>  07-25    LIVER FUNCTIONS - ( 25 Jul 2017 07:44 )  Alb: 3.9 g/dL / Pro: 7.7 g/dL / ALK PHOS: 293 U/L / ALT: 627 U/L RC / AST: 206 U/L / GGT: x           PT/INR - ( 25 Jul 2017 07:44 )   PT: 10.9 sec;   INR: 1.00 ratio         PTT - ( 25 Jul 2017 07:44 )  PTT:35.4 sec        Imaging:

## 2017-07-25 NOTE — DIETITIAN INITIAL EVALUATION ADULT. - ADHERENCE
Pt doesn't follow any specific therapeutic diet PTA but tries to eat generally healthy. Confirms NKFA, Takes whey protein on days he exercises (4-5days per week)

## 2017-07-25 NOTE — DIETITIAN INITIAL EVALUATION ADULT. - ORAL INTAKE PTA
good/eats 4-6 meals per day. Snack- water and a banana/apple; Breakfast- turkey mobley and eggs with toast; Snack- quest bar; Lunch- skinless chicken/turkey c vegetables and protein or brown rice; Dinner- fish with vegetables and whole grains or hamburger; other snacks could be a good fat (avocado, nuts) or whey protein mixed with water or Gatorade. Drinks water or black coffee. He eats pizza once weekly and eats grilled options at a fast food establishment once weekly.

## 2017-07-25 NOTE — PROGRESS NOTE ADULT - ASSESSMENT
31 y/o M w/ no sig PMHx p/w R epigastric pain x 3 days and dark urine, found to have US finding of choledocholithiasis.  Continued to have elevated LFTs post-ERCP.

## 2017-07-25 NOTE — PROGRESS NOTE ADULT - ATTENDING COMMENTS
Patient feels well.  No abdominal tenderness.  Agree with repeat MRI to assess biliary anatomy given persisting jaundice.

## 2017-07-25 NOTE — DIETITIAN INITIAL EVALUATION ADULT. - NS AS NUTRI INTERV ED CONTENT
Recommended modifications/Purpose of the nutrition education/Nutrition relationship to health/disease/1) Low fat diet education provided including foods recommended and foods to avoid. Also discussed general healthful eating per Pt request.

## 2017-07-25 NOTE — DIETITIAN INITIAL EVALUATION ADULT. - ENERGY NEEDS
ht: 68 inches, wt: 246.9 pounds, BMI: 38.7 kg/m2, IBW: 148 pounds (+/- 10%), 167 %IBW  Edema: none noted. Skin: intact.  Other pertinent information: 31 y/o male presented c epigastric pain. S/p ERCP, stone and sludge removed. Per chart plan for cholecystectomy in 6 weeks.

## 2017-07-25 NOTE — PROGRESS NOTE ADULT - ASSESSMENT
Choledocholithiasis s/p ERCP with extraction of stones/sludge by sphincterotomy and balloon sweeps 3 days ago, bilirubin and alk phos continuing to uptrend since procedure and repeat abd us now showing increasing CBD dilation.     PLAN:  -still concerned for choledocolithiasis given worsening bilirubin and enlarged CBD.  -recommend getting repeat MRCP to further evaluate.   -no plans for procedure from advanced endosocpy, will continue to monitor LFT"s. .   -no plans for liver biopsy at this moment.   -pt will require elective CCY as an outpatient.   -continue to trend LFT's and INR.

## 2017-07-25 NOTE — PROGRESS NOTE ADULT - SUBJECTIVE AND OBJECTIVE BOX
Subjective:  No acute events overnight.  Currently in no pain or discomfort.    VITAL SIGNS:  Vital Signs Last 24 Hrs  T(C): 36.7 (25 Jul 2017 13:51), Max: 36.8 (25 Jul 2017 04:51)  T(F): 98 (25 Jul 2017 13:51), Max: 98.2 (25 Jul 2017 04:51)  HR: 72 (25 Jul 2017 13:51) (72 - 99)  BP: 131/78 (25 Jul 2017 13:51) (129/80 - 137/82)  BP(mean): --  RR: 18 (25 Jul 2017 13:51) (18 - 18)  SpO2: 97% (25 Jul 2017 13:51) (96% - 97%)      PHYSICAL EXAM:  GENERAL: NAD, well-developed  HEAD:  Atraumatic  EYES: EOMI, PERRL, scleral icterus present  NECK: Supple, No JVD  CHEST/LUNG: Clear to auscultation bilaterally; No wheezes, rales, or rhonchi  HEART: Regular rate and rhythm; No murmurs, rubs, or gallops  ABDOMEN: Soft, Nontender, Nondistended; Bowel sounds present  EXTREMITIES:  2+ Peripheral Pulses, No clubbing, cyanosis, or edema  NEUROLOGY: A&O x3  SKIN: eczema      07-25    136  |  97  |  21  ----------------------------<  111<H>  3.9   |  25  |  1.03    Ca    10.0      25 Jul 2017 07:44  Phos  4.0     07-24  Mg     2.3     07-24    TPro  7.7  /  Alb  3.9  /  TBili  10.6<H>  /  DBili  x   /  AST  206<H>  /  ALT  627<H>  /  AlkPhos  293<H>  07-25        MEDICATIONS  (STANDING):  sodium chloride 0.9%. 1000 milliLiter(s) (75 mL/Hr) IV Continuous <Continuous> Subjective:  No acute events overnight.  Currently in no pain or discomfort.    VITAL SIGNS:  Vital Signs Last 24 Hrs  T(C): 36.7 (25 Jul 2017 13:51), Max: 36.8 (25 Jul 2017 04:51)  T(F): 98 (25 Jul 2017 13:51), Max: 98.2 (25 Jul 2017 04:51)  HR: 72 (25 Jul 2017 13:51) (72 - 99)  BP: 131/78 (25 Jul 2017 13:51) (129/80 - 137/82)  BP(mean): --  RR: 18 (25 Jul 2017 13:51) (18 - 18)  SpO2: 97% (25 Jul 2017 13:51) (96% - 97%)      PHYSICAL EXAM:  GENERAL: NAD, well-developed  HEAD:  Atraumatic  EYES: EOMI, PERRL, scleral icterus present  NECK: Supple, No JVD  CHEST/LUNG: Clear to auscultation bilaterally; No wheezes, rales, or rhonchi  HEART: Regular rate and rhythm; No murmurs, rubs, or gallops  ABDOMEN: Soft, Nontender, Nondistended; Bowel sounds present  EXTREMITIES:  2+ Peripheral Pulses, No clubbing, cyanosis, or edema  NEUROLOGY: A&O x3  SKIN: eczema      07-25    136  |  97  |  21  ----------------------------<  111<H>  3.9   |  25  |  1.03    Ca    10.0      25 Jul 2017 07:44  Phos  4.0     07-24  Mg     2.3     07-24    TPro  7.7  /  Alb  3.9  /  TBili  10.6<H>  /  DBili  x   /  AST  206<H>  /  ALT  627<H>  /  AlkPhos  293<H>  07-25        MEDICATIONS  (STANDING):  sodium chloride 0.9%. 1000 milliLiter(s) (75 mL/Hr) IV Continuous <Continuous>      consult note reviewed: Gastroenterology--> repeat MRI to assess biliary anatomy given persisting jaundice..

## 2017-07-25 NOTE — PROGRESS NOTE ADULT - PROBLEM SELECTOR PLAN 2
- S/p ERCP Fri,   - Hep panel unremarkable  - US done shows Hepatomegaly and hepatic steatosis, Intrahepatic and extrahepatic biliary ductal dilatation, No CBD stone is evident, Cholelithiasis and sludge - based on these results waiting on Gastroenterology to determine if they would like to repeat ERCP - as of now no plan for biopsy

## 2017-07-25 NOTE — PROGRESS NOTE ADULT - PROBLEM SELECTOR PLAN 3
- Prior concern of portal vein thrombosis on U/S, however no evidence of thrombosis on MRI imaging, heparin discontinued  - Lupus anticoag + with elevated AT3, also elevated s/c ratio and increased silica clotting time so will confirm with Heme that this is nothing to worry about while inpatient  - per heme, will need to be off anticoagulation and will need to f/u with heme 12 weeks from discharge for repeat blood work - Prior concern of portal vein thrombosis on U/S, however no evidence of thrombosis on MRI imaging, heparin discontinued  - Lupus anticoag + with elevated AT3, also elevated s/c ratio and increased silica clotting time x2 so spoke to heme today and they will get back to us to ensure that this is nothing to worry about while inpatient  - per heme, will need to be off anticoagulation and will need to f/u with heme 12 weeks from discharge for repeat blood work

## 2017-07-25 NOTE — PROGRESS NOTE ADULT - PROBLEM SELECTOR PLAN 1
- s/p ERCP, trending LFTs (still elevated). Tbili and alk phos both still high and not much change since yesterday, which is unusual after ERCP. Direct bili elevated to 8 on 7/24.  - Surgery (Dr. Trevizo) will f/u w/ patient 6 weeks from discharge for discussion of elective cholecystectomy

## 2017-07-25 NOTE — DIETITIAN INITIAL EVALUATION ADULT. - NS AS NUTRI INTERV MEALS SNACK
Fat - modified diet/General/healthful diet/1) Continue current diet as ordered. 2) Provide food preferences within diet restrictions as feasible. 3) Monitor po intake, Wt, labs, GI tolerance, skin integrity

## 2017-07-25 NOTE — DIETITIAN INITIAL EVALUATION ADULT. - OTHER INFO
Pt seen for length of stay. Pt reports UBW ~250lbs "give or take a few pounds". Bed scale Wt 7/18: 159.9lbs, standing Wt 7/19: 146.9lbs. Pt reports a good appetite, noted c % intake. Pt admitted c abdominal pain but denies any GI distress for the past 5-6 days. Denies any difficulty chewing/swallowing.

## 2017-07-26 LAB
ALBUMIN SERPL ELPH-MCNC: 3.7 G/DL — SIGNIFICANT CHANGE UP (ref 3.3–5)
ALP SERPL-CCNC: 296 U/L — HIGH (ref 40–120)
ALT FLD-CCNC: 553 U/L RC — HIGH (ref 10–45)
ANION GAP SERPL CALC-SCNC: 13 MMOL/L — SIGNIFICANT CHANGE UP (ref 5–17)
APTT BLD: 34.6 SEC — SIGNIFICANT CHANGE UP (ref 27.5–37.4)
AST SERPL-CCNC: 196 U/L — HIGH (ref 10–40)
BILIRUB SERPL-MCNC: 12.3 MG/DL — HIGH (ref 0.2–1.2)
BUN SERPL-MCNC: 21 MG/DL — SIGNIFICANT CHANGE UP (ref 7–23)
CALCIUM SERPL-MCNC: 9.9 MG/DL — SIGNIFICANT CHANGE UP (ref 8.4–10.5)
CHLORIDE SERPL-SCNC: 96 MMOL/L — SIGNIFICANT CHANGE UP (ref 96–108)
CO2 SERPL-SCNC: 25 MMOL/L — SIGNIFICANT CHANGE UP (ref 22–31)
CREAT SERPL-MCNC: 0.96 MG/DL — SIGNIFICANT CHANGE UP (ref 0.5–1.3)
GLUCOSE SERPL-MCNC: 120 MG/DL — HIGH (ref 70–99)
INR BLD: 1.05 RATIO — SIGNIFICANT CHANGE UP (ref 0.88–1.16)
POTASSIUM SERPL-MCNC: 4 MMOL/L — SIGNIFICANT CHANGE UP (ref 3.5–5.3)
POTASSIUM SERPL-SCNC: 4 MMOL/L — SIGNIFICANT CHANGE UP (ref 3.5–5.3)
PROT SERPL-MCNC: 7.5 G/DL — SIGNIFICANT CHANGE UP (ref 6–8.3)
PROTHROM AB SERPL-ACNC: 11.3 SEC — SIGNIFICANT CHANGE UP (ref 9.8–12.7)
SODIUM SERPL-SCNC: 134 MMOL/L — LOW (ref 135–145)

## 2017-07-26 PROCEDURE — 99232 SBSQ HOSP IP/OBS MODERATE 35: CPT | Mod: GC

## 2017-07-26 PROCEDURE — 74181 MRI ABDOMEN W/O CONTRAST: CPT | Mod: 26

## 2017-07-26 NOTE — PROGRESS NOTE ADULT - ASSESSMENT
Impression:    1. Choledocholithiasis: s/p ERCP and sphincterotomy w stone removal but CBD diameter increased from 8mm to 13 mm based on recent ultrasound and bilirubin continues to trend up.    2. Mixed pattern LFT: initial presentation was consistent with hepatocellular injury h/e aminotransferases continue to trend down but bilirubin trends up. The ddx includes persistent CBD obstruction vs. resolving hepatocellular injury with delayed hyperbilirubinemia.    Recommendations  -Repeat MRCP to assess for persistent CBD obstruction  -please check reticulocytes and haptoglobing tomorrow as well as direct bilirubin to asses for any hemolytic componenet to hyperbilirubinemia

## 2017-07-26 NOTE — PROGRESS NOTE ADULT - PROBLEM SELECTOR PLAN 3
- Prior concern of portal vein thrombosis on U/S, however no evidence of thrombosis on MRI imaging, heparin discontinued  - Lupus anticoag + with elevated AT3, also elevated s/c ratio and increased silica clotting time x2 so heme will get back to us to ensure that this is nothing to worry about while inpatient  - per heme, will need to be off anticoagulation and will need to f/u with heme 12 weeks from discharge for repeat blood work - Prior concern of portal vein thrombosis on U/S, however no evidence of thrombosis on MRI imaging, heparin discontinued  - Lupus anticoag + with elevated AT3, also elevated s/c ratio and increased silica clotting time x2  - per heme, will need to be off anticoagulation and will need to f/u with heme 12 weeks from discharge for repeat blood work but at this point there is no need for anticoagulation

## 2017-07-26 NOTE — CHART NOTE - NSCHARTNOTEFT_GEN_A_CORE
Originally consulted for hypercoaguable workup for portal vein thrombosis, however there was no portal vein thrombosis on further imaging with MRI. Prior to the MRI result there was indeterminate Lupus Anticoagulant which was repeated and remained indeterminate, other lupus antiphospholipid antibodies negative. Patient was given a script to have his Lupus Anticoagulant rechecked in 12 weeks, however even if positive patient has no thrombus and would not recommend anticoagulation. Discussed with primary team resident, please reconsult as needed.    Mitch West  PGY-5, Hematology-Oncology Fellow  [phone] ([place]) [phone] ([facility])

## 2017-07-26 NOTE — PROGRESS NOTE ADULT - PROBLEM SELECTOR PLAN 1
- s/p ERCP, trending LFTs (still elevated). Tbili and alk phos both still high with T bili increased from 10.6 to 12.3.  AST and ALT improving.  - Surgery (Dr. Trevizo) will f/u w/ patient 6 weeks from discharge for discussion of elective cholecystectomy

## 2017-07-26 NOTE — PROGRESS NOTE ADULT - SUBJECTIVE AND OBJECTIVE BOX
HEPATOLOGY SERVICE PROGRESS NOTE    Patient is a 32y old  Male who presents with a chief complaint of R Epigastric pain (22 Jul 2017 17:38)      SUBJECTIVE / OVERNIGHT EVENTS: Denies abdominal pain or nausea. Still w dark colored urine.    MEDICATIONS  (STANDING):  sodium chloride 0.9%. 1000 milliLiter(s) (75 mL/Hr) IV Continuous <Continuous>    MEDICATIONS  (PRN):        CAPILLARY BLOOD GLUCOSE        I&O's Summary    25 Jul 2017 07:01  -  26 Jul 2017 07:00  --------------------------------------------------------  IN: 360 mL / OUT: 0 mL / NET: 360 mL        ROS:  General: no fevers chills  Neuro: No headache  MSK: No back pain  Cardiac: No chest pain, palpitations  Pulmonary: No SOB or cough  GI: As per HPI  : No dysuria or hesitancy  Skin: No rash or pruritis      PHYSICAL EXAM:  GENERAL: NAD, well-developed  HEAD:  Atraumatic, Normocephalic  EYES: EOMI, PERRLA, conjunctiva and sclera icteric  NECK: Supple, No JVD  CHEST/LUNG: Clear to auscultation bilaterally; No wheeze  HEART: Regular rate and rhythm; No murmurs, rubs, or gallops  ABDOMEN: Soft, Nontender, Nondistended; Bowel sounds present, no hepatosplenomegaly, no rebound or guarding  EXTREMITIES:  2+ Peripheral Pulses, No clubbing, cyanosis, or edema  PSYCH: AAOx3  NEUROLOGY: non-focal, no asterixis  SKIN: jaundice  LABS:    07-26    134<L>  |  96  |  21  ----------------------------<  120<H>  4.0   |  25  |  0.96    Ca    9.9      26 Jul 2017 06:45    TPro  7.5  /  Alb  3.7  /  TBili  12.3<H>  /  DBili  x   /  AST  196<H>  /  ALT  553<H>  /  AlkPhos  296<H>  07-26    LIVER FUNCTIONS - ( 26 Jul 2017 06:45 )  Alb: 3.7 g/dL / Pro: 7.5 g/dL / ALK PHOS: 296 U/L / ALT: 553 U/L RC / AST: 196 U/L / GGT: x           PT/INR - ( 26 Jul 2017 06:45 )   PT: 11.3 sec;   INR: 1.05 ratio         PTT - ( 26 Jul 2017 06:45 )  PTT:34.6 sec          RADIOLOGY & ADDITIONAL TESTS:

## 2017-07-26 NOTE — PROGRESS NOTE ADULT - SUBJECTIVE AND OBJECTIVE BOX
Subjective:  No acute events overnight.  Currently in no pain or discomfort.    VITAL SIGNS:  Vital Signs Last 24 Hrs  T(C): 37 (26 Jul 2017 04:38), Max: 37 (26 Jul 2017 04:38)  T(F): 98.6 (26 Jul 2017 04:38), Max: 98.6 (26 Jul 2017 04:38)  HR: 86 (26 Jul 2017 04:38) (72 - 86)  BP: 121/83 (26 Jul 2017 04:38) (121/83 - 131/78)  BP(mean): --  RR: 18 (26 Jul 2017 04:38) (18 - 18)  SpO2: 99% (26 Jul 2017 04:38) (97% - 99%)      PHYSICAL EXAM:  GENERAL: NAD, well-developed  HEAD:  Atraumatic  EYES: EOMI, PERRL, scleral icterus present  NECK: Supple, No JVD  CHEST/LUNG: Clear to auscultation bilaterally; No wheezes, rales, or rhonchi  HEART: Regular rate and rhythm; No murmurs, rubs, or gallops  ABDOMEN: Soft, Nontender, Nondistended; Bowel sounds present  EXTREMITIES:  2+ Peripheral Pulses, No clubbing, cyanosis, or edema  NEUROLOGY: A&O x3  SKIN: eczema      07-26    134<L>  |  96  |  21  ----------------------------<  120<H>  4.0   |  25  |  0.96    Ca    9.9      26 Jul 2017 06:45    TPro  7.5  /  Alb  3.7  /  TBili  12.3<H>  /  DBili  8.5<H>  /  AST  196<H>  /  ALT  553<H>  /  AlkPhos  296<H>  07-26        MEDICATIONS  (STANDING):  sodium chloride 0.9%. 1000 milliLiter(s) (75 mL/Hr) IV Continuous <Continuous> Subjective:  No acute events overnight.  Currently in no pain or discomfort.    VITAL SIGNS:  Vital Signs Last 24 Hrs  T(C): 37 (26 Jul 2017 04:38), Max: 37 (26 Jul 2017 04:38)  T(F): 98.6 (26 Jul 2017 04:38), Max: 98.6 (26 Jul 2017 04:38)  HR: 86 (26 Jul 2017 04:38) (72 - 86)  BP: 121/83 (26 Jul 2017 04:38) (121/83 - 131/78)  BP(mean): --  RR: 18 (26 Jul 2017 04:38) (18 - 18)  SpO2: 99% (26 Jul 2017 04:38) (97% - 99%)      PHYSICAL EXAM:  GENERAL: NAD, well-developed  EYES: EOMI, PERRL, scleral icterus present  CHEST/LUNG: Clear to auscultation bilaterally; No wheezes, rales, or rhonchi  HEART: Regular rate and rhythm; No murmurs, rubs, or gallops  ABDOMEN: Soft, Nontender, Nondistended; Bowel sounds present  EXTREMITIES:  2+ Peripheral Pulses, No clubbing, cyanosis, or edema  NEUROLOGY: A&O x3  SKIN: eczema      07-26    134<L>  |  96  |  21  ----------------------------<  120<H>  4.0   |  25  |  0.96    Ca    9.9      26 Jul 2017 06:45    TPro  7.5  /  Alb  3.7  /  TBili  12.3<H>  /  DBili  8.5<H>  /  AST  196<H>  /  ALT  553<H>  /  AlkPhos  296<H>  07-26        MEDICATIONS  (STANDING):  sodium chloride 0.9%. 1000 milliLiter(s) (75 mL/Hr) IV Continuous <Continuous>

## 2017-07-26 NOTE — PROGRESS NOTE ADULT - PROBLEM SELECTOR PLAN 2
- S/p ERCP  - Hep panel unremarkable  - US done shows Hepatomegaly and hepatic steatosis, Intrahepatic and extrahepatic biliary ductal dilatation, No CBD stone is evident, Cholelithiasis and sludge  - plan for MRCP today - as of now no plan for biopsy - S/p ERCP  - US done shows Hepatomegaly and hepatic steatosis, Intrahepatic and extrahepatic biliary ductal dilatation, No CBD stone is evident, Cholelithiasis and sludge  - plan for repeat MRCP today - as of now no plan for biopsy

## 2017-07-27 LAB
ALBUMIN SERPL ELPH-MCNC: 3.8 G/DL — SIGNIFICANT CHANGE UP (ref 3.3–5)
ALP SERPL-CCNC: 297 U/L — HIGH (ref 40–120)
ALT FLD-CCNC: 490 U/L RC — HIGH (ref 10–45)
ANION GAP SERPL CALC-SCNC: 15 MMOL/L — SIGNIFICANT CHANGE UP (ref 5–17)
AST SERPL-CCNC: 171 U/L — HIGH (ref 10–40)
BILIRUB DIRECT SERPL-MCNC: 9.1 MG/DL — HIGH (ref 0–0.2)
BILIRUB SERPL-MCNC: 12.8 MG/DL — HIGH (ref 0.2–1.2)
BUN SERPL-MCNC: 22 MG/DL — SIGNIFICANT CHANGE UP (ref 7–23)
CALCIUM SERPL-MCNC: 9.8 MG/DL — SIGNIFICANT CHANGE UP (ref 8.4–10.5)
CHLORIDE SERPL-SCNC: 97 MMOL/L — SIGNIFICANT CHANGE UP (ref 96–108)
CO2 SERPL-SCNC: 24 MMOL/L — SIGNIFICANT CHANGE UP (ref 22–31)
CREAT SERPL-MCNC: 0.96 MG/DL — SIGNIFICANT CHANGE UP (ref 0.5–1.3)
GLUCOSE SERPL-MCNC: 126 MG/DL — HIGH (ref 70–99)
HAPTOGLOB SERPL-MCNC: 130 MG/DL — SIGNIFICANT CHANGE UP (ref 34–200)
POTASSIUM SERPL-MCNC: 4 MMOL/L — SIGNIFICANT CHANGE UP (ref 3.5–5.3)
POTASSIUM SERPL-SCNC: 4 MMOL/L — SIGNIFICANT CHANGE UP (ref 3.5–5.3)
PROT SERPL-MCNC: 7.4 G/DL — SIGNIFICANT CHANGE UP (ref 6–8.3)
RBC # BLD: 4.7 M/UL — SIGNIFICANT CHANGE UP (ref 4.2–5.8)
RETICS #: 55.1 K/UL — SIGNIFICANT CHANGE UP (ref 25–125)
RETICS/RBC NFR: 1.2 % — SIGNIFICANT CHANGE UP (ref 0.5–2.5)
SODIUM SERPL-SCNC: 136 MMOL/L — SIGNIFICANT CHANGE UP (ref 135–145)

## 2017-07-27 PROCEDURE — 99232 SBSQ HOSP IP/OBS MODERATE 35: CPT | Mod: GC

## 2017-07-27 NOTE — PROGRESS NOTE ADULT - ASSESSMENT
Impression:    1. Choledocholithiasis: s/p ERCP and sphincterotomy w stone removal but CBD diameter increased from 8mm to 13 mm based on recent ultrasound and bilirubin continues to trend up. MRCP pending    2. Mixed pattern LFT: initial presentation was consistent with hepatocellular injury h/e aminotransferases continue to trend down but bilirubin trends up. The ddx includes persistent CBD obstruction vs. resolving hepatocellular injury with delayed hyperbilirubinemia.    Recommendations Impression:    1. Choledocholithiasis: s/p ERCP and sphincterotomy w stone removal but CBD diameter increased from 8mm to 13 mm based on recent ultrasound and bilirubin continues to trend up. MRCP completed by read pending. Consider repeat ERCP depending on results.     2. Mixed pattern LFT: initial presentation was consistent with hepatocellular injury h/e aminotransferases continue to trend down but bilirubin trends up. The ddx includes persistent CBD obstruction vs. resolving hepatocellular injury with delayed hyperbilirubinemia.    Recommendations  Follow up MRCP. consider repeat ERCP if shows signs of obstruction.

## 2017-07-27 NOTE — PROGRESS NOTE ADULT - PROBLEM SELECTOR PLAN 2
- S/p ERCP  - US done shows Hepatomegaly and hepatic steatosis, Intrahepatic and extrahepatic biliary ductal dilatation, No CBD stone is evident, Cholelithiasis and sludge  - plan for repeat MRCP today - as of now no plan for biopsy - S/p ERCP  - US done shows Hepatomegaly and hepatic steatosis, Intrahepatic and extrahepatic biliary ductal dilatation, No CBD stone is evident, Cholelithiasis and sludge  - MRCP done with results pending - possible repeat ERCP with stent placement and as of now no plan for biopsy

## 2017-07-27 NOTE — PROGRESS NOTE ADULT - PROBLEM SELECTOR PLAN 1
- s/p ERCP, trending LFTs (still elevated). Tbili and alk phos both still high with T bili increased from 10.6 to 12.3.  AST and ALT improving.  - Surgery (Dr. Trevizo) will f/u w/ patient 6 weeks from discharge for discussion of elective cholecystectomy - s/p ERCP, trending LFTs (still elevated). Tbili and alk phos both not improving.  AST and ALT improving.  - Surgery (Dr. Trevizo) will f/u w/ patient 6 weeks from discharge for discussion of elective cholecystectomy

## 2017-07-27 NOTE — PROGRESS NOTE ADULT - SUBJECTIVE AND OBJECTIVE BOX
Manny Dale MD  Cell: [phone]  Pager: [phone]    Subjective:  No acute events overnight.  Currently in no pain or discomfort.      VITAL SIGNS:  Vital Signs Last 24 Hrs  T(C): 36.8 (27 Jul 2017 05:12), Max: 37 (26 Jul 2017 13:44)  T(F): 98.3 (27 Jul 2017 05:12), Max: 98.6 (26 Jul 2017 13:44)  HR: 74 (27 Jul 2017 05:12) (74 - 96)  BP: 134/79 (27 Jul 2017 05:12) (128/78 - 134/79)  BP(mean): --  RR: 16 (27 Jul 2017 05:12) (16 - 20)  SpO2: 96% (27 Jul 2017 05:12) (96% - 99%)      PHYSICAL EXAM:  GENERAL: NAD, well-developed  EYES: EOMI, PERRL, scleral icterus present  CHEST/LUNG: Clear to auscultation bilaterally; No wheezes, rales, or rhonchi  HEART: Regular rate and rhythm; No murmurs, rubs, or gallops  ABDOMEN: Soft, Nontender, Nondistended; Bowel sounds present  EXTREMITIES:  2+ Peripheral Pulses, No clubbing, cyanosis, or edema  NEUROLOGY: A&O x3  SKIN: eczema, jaundice      07-27    136  |  97  |  22  ----------------------------<  126<H>  4.0   |  24  |  0.96    Ca    9.8      27 Jul 2017 07:01    TPro  7.4  /  Alb  3.8  /  TBili  12.8<H>  /  DBili  9.1<H>  /  AST  171<H>  /  ALT  490<H>  /  AlkPhos  297<H>  07-27      CAPILLARY BLOOD GLUCOSE          MEDICATIONS  (STANDING):  sodium chloride 0.9%. 1000 milliLiter(s) (75 mL/Hr) IV Continuous <Continuous> Manny Dale MD  Cell: [phone]  Pager: [phone]    Subjective:  No acute events overnight.  Currently in no pain or discomfort.      VITAL SIGNS:  Vital Signs Last 24 Hrs  T(C): 36.8 (27 Jul 2017 05:12), Max: 37 (26 Jul 2017 13:44)  T(F): 98.3 (27 Jul 2017 05:12), Max: 98.6 (26 Jul 2017 13:44)  HR: 74 (27 Jul 2017 05:12) (74 - 96)  BP: 134/79 (27 Jul 2017 05:12) (128/78 - 134/79)  BP(mean): --  RR: 16 (27 Jul 2017 05:12) (16 - 20)  SpO2: 96% (27 Jul 2017 05:12) (96% - 99%)      PHYSICAL EXAM:  GENERAL: NAD, well-developed  EYES: EOMI, PERRL, scleral icterus present  CHEST/LUNG: Clear to auscultation bilaterally; No wheezes, rales, or rhonchi  HEART: Regular rate and rhythm; No murmurs, rubs, or gallops  ABDOMEN: Soft, Nontender, Nondistended; Bowel sounds present  EXTREMITIES:  2+ Peripheral Pulses, No clubbing, cyanosis, or edema  NEUROLOGY: A&O x3  SKIN: eczema, jaundice      07-27    136  |  97  |  22  ----------------------------<  126<H>  4.0   |  24  |  0.96    Ca    9.8      27 Jul 2017 07:01    TPro  7.4  /  Alb  3.8  /  TBili  12.8<H>  /  DBili  9.1<H>  /  AST  171<H>  /  ALT  490<H>  /  AlkPhos  297<H>  07-27      CAPILLARY BLOOD GLUCOSE      MEDICATIONS  (STANDING):  sodium chloride 0.9%. 1000 milliLiter(s) (75 mL/Hr) IV Continuous <Continuous>

## 2017-07-27 NOTE — PROGRESS NOTE ADULT - SUBJECTIVE AND OBJECTIVE BOX
Chief Complaint:  Patient is a 32y old  Male who presents with a chief complaint of R Epigastric pain (22 Jul 2017 17:38)      Interval Events:   No overnight events. Patient continues to feel well. Denies abdominal pain or n/v/d.     Allergies:  No Known Allergies      Hospital Medications:  sodium chloride 0.9%. 1000 milliLiter(s) IV Continuous <Continuous>      PMHX/PSHX:  Eczema, unspecified type  No pertinent past medical history  No significant past surgical history      Family history:  Family history of gallbladder disease (Father, Grandparent)  Family history of coronary artery disease in mother (Mother)      ROS:     General:  No wt loss, fevers, chills, night sweats, fatigue,   Eyes:  Good vision, no reported pain  ENT:  No sore throat, pain, runny nose, dysphagia  CV:  No pain, palpitations, hypo/hypertension  Resp:  No dyspnea, cough, tachypnea, wheezing  GI:  See HPI  :  No pain, bleeding, incontinence, nocturia  Muscle:  No pain, weakness  Neuro:  No weakness, tingling, memory problems  Psych:  No fatigue, insomnia, mood problems, depression  Endocrine:  No polyuria, polydipsia, cold/heat intolerance  Heme:  No petechiae, ecchymosis, easy bruisability  Skin:  No rash, edema      PHYSICAL EXAM:     GENERAL:  Appears stated age, well-groomed, well-nourished, no distress  HEENT:  NC/AT,  conjunctivae clear, +Scleral Icterus  CHEST:  Full & symmetric excursion, no increased effort, breath sounds clear  HEART:  Regular rhythm, S1, S2, no murmur/rub/S3/S4,  no edema  ABDOMEN:  Soft, non-tender, non-distended, normoactive bowel sounds,  no masses ,no hepato-splenomegaly,   EXTREMITIES:  no cyanosis,clubbing or edema  SKIN:  +Jaundice. No rash/erythema/ecchymoses/petechiae/wounds/abscess/warm/dry  NEURO:  Alert, oriented    Vital Signs:  Vital Signs Last 24 Hrs  T(C): 36.8 (27 Jul 2017 05:12), Max: 37 (26 Jul 2017 13:44)  T(F): 98.3 (27 Jul 2017 05:12), Max: 98.6 (26 Jul 2017 13:44)  HR: 74 (27 Jul 2017 05:12) (74 - 96)  BP: 134/79 (27 Jul 2017 05:12) (128/78 - 134/79)  BP(mean): --  RR: 16 (27 Jul 2017 05:12) (16 - 20)  SpO2: 96% (27 Jul 2017 05:12) (96% - 99%)  Daily     Daily     LABS:    07-27    136  |  97  |  22  ----------------------------<  126<H>  4.0   |  24  |  0.96    Ca    9.8      27 Jul 2017 07:01    TPro  7.4  /  Alb  3.8  /  TBili  12.8<H>  /  DBili  9.1<H>  /  AST  171<H>  /  ALT  490<H>  /  AlkPhos  297<H>  07-27    LIVER FUNCTIONS - ( 27 Jul 2017 07:01 )  Alb: 3.8 g/dL / Pro: 7.4 g/dL / ALK PHOS: 297 U/L / ALT: 490 U/L RC / AST: 171 U/L / GGT: x           PT/INR - ( 26 Jul 2017 06:45 )   PT: 11.3 sec;   INR: 1.05 ratio         PTT - ( 26 Jul 2017 06:45 )  PTT:34.6 sec        Imaging:

## 2017-07-27 NOTE — PROGRESS NOTE ADULT - PROBLEM SELECTOR PLAN 3
- Prior concern of portal vein thrombosis on U/S, however no evidence of thrombosis on MRI imaging, heparin discontinued  - Lupus anticoag + with elevated AT3, also elevated s/c ratio and increased silica clotting time x2  - per heme, will need to be off anticoagulation and will need to f/u with heme 12 weeks from discharge for repeat blood work but at this point there is no need for anticoagulation - Prior concern of portal vein thrombosis on U/S, however no evidence of thrombosis on MRI imaging, heparin discontinued  - Lupus anticoag + with elevated AT3, also elevated s/c ratio and increased silica clotting time x2  - f/u with heme 12 weeks from discharge for repeat blood work but at this point there is no need for anticoagulation.

## 2017-07-28 LAB
ALBUMIN SERPL ELPH-MCNC: 3.7 G/DL — SIGNIFICANT CHANGE UP (ref 3.3–5)
ALP SERPL-CCNC: 281 U/L — HIGH (ref 40–120)
ALT FLD-CCNC: 439 U/L RC — HIGH (ref 10–45)
ANION GAP SERPL CALC-SCNC: 12 MMOL/L — SIGNIFICANT CHANGE UP (ref 5–17)
APTT BLD: 32.3 SEC — SIGNIFICANT CHANGE UP (ref 27.5–37.4)
AST SERPL-CCNC: 166 U/L — HIGH (ref 10–40)
BILIRUB SERPL-MCNC: 12.5 MG/DL — HIGH (ref 0.2–1.2)
BUN SERPL-MCNC: 24 MG/DL — HIGH (ref 7–23)
CALCIUM SERPL-MCNC: 9.6 MG/DL — SIGNIFICANT CHANGE UP (ref 8.4–10.5)
CHLORIDE SERPL-SCNC: 96 MMOL/L — SIGNIFICANT CHANGE UP (ref 96–108)
CO2 SERPL-SCNC: 26 MMOL/L — SIGNIFICANT CHANGE UP (ref 22–31)
CREAT SERPL-MCNC: 0.92 MG/DL — SIGNIFICANT CHANGE UP (ref 0.5–1.3)
GLUCOSE SERPL-MCNC: 121 MG/DL — HIGH (ref 70–99)
INR BLD: 1 RATIO — SIGNIFICANT CHANGE UP (ref 0.88–1.16)
POTASSIUM SERPL-MCNC: 3.8 MMOL/L — SIGNIFICANT CHANGE UP (ref 3.5–5.3)
POTASSIUM SERPL-SCNC: 3.8 MMOL/L — SIGNIFICANT CHANGE UP (ref 3.5–5.3)
PROT SERPL-MCNC: 7.1 G/DL — SIGNIFICANT CHANGE UP (ref 6–8.3)
PROTHROM AB SERPL-ACNC: 10.8 SEC — SIGNIFICANT CHANGE UP (ref 9.8–12.7)
SODIUM SERPL-SCNC: 134 MMOL/L — LOW (ref 135–145)

## 2017-07-28 PROCEDURE — 74328 X-RAY BILE DUCT ENDOSCOPY: CPT | Mod: 26,GC

## 2017-07-28 PROCEDURE — 99232 SBSQ HOSP IP/OBS MODERATE 35: CPT | Mod: GC

## 2017-07-28 PROCEDURE — 43264 ERCP REMOVE DUCT CALCULI: CPT | Mod: GC

## 2017-07-28 PROCEDURE — 43277 ERCP EA DUCT/AMPULLA DILATE: CPT | Mod: 59,GC

## 2017-07-28 RX ORDER — SODIUM CHLORIDE 9 MG/ML
1000 INJECTION INTRAMUSCULAR; INTRAVENOUS; SUBCUTANEOUS
Qty: 0 | Refills: 0 | Status: DISCONTINUED | OUTPATIENT
Start: 2017-07-28 | End: 2017-08-01

## 2017-07-28 RX ADMIN — SODIUM CHLORIDE 75 MILLILITER(S): 9 INJECTION INTRAMUSCULAR; INTRAVENOUS; SUBCUTANEOUS at 11:10

## 2017-07-28 NOTE — PROGRESS NOTE ADULT - ASSESSMENT
33 y/o M w/ no sig PMHx p/w R epigastric pain x 3 days and dark urine, found to have US finding of choledocholithiasis.  Continued to have elevated LFTs post-ERCP.

## 2017-07-28 NOTE — PROGRESS NOTE ADULT - PROBLEM SELECTOR PLAN 2
- S/p ERCP  - US done shows Hepatomegaly and hepatic steatosis, Intrahepatic and extrahepatic biliary ductal dilatation, No CBD stone is evident, Cholelithiasis and sludge  - MRCP done with results pending - possible repeat ERCP with stent placement and as of now no plan for biopsy - S/p ERCP and MRCP  - Original US showed Hepatomegaly and hepatic steatosis, Intrahepatic and extrahepatic biliary ductal dilatation, No CBD stone evident, Cholelithiasis and sludge  - MRCP done 7/27 showed persistent progressive dilatation of intrahepatic and extrahepatic biliary ducts with 4 mm calculus at the distal CBD   - plan to repeat ERCP with probable stent placement today and as of now no plan for biopsy - S/p ERCP and MRCP  - MRCP done 7/27 showed persistent progressive dilatation of intrahepatic and extrahepatic biliary ducts with 4 mm calculus at the distal CBD   - plan to repeat ERCP with probable stent placement today and as of now no plan for biopsy

## 2017-07-28 NOTE — PROGRESS NOTE ADULT - ASSESSMENT
Impression:    1. Choledocholithiasis: s/p ERCP and sphincterotomy w stone removal but CBD diameter increased from 8mm to 13 mm based on recent ultrasound. MRCP again shows ductal dilation with cholelithiasis in distal CBD.     2. Mixed pattern LFT: initial presentation was consistent with hepatocellular injury h/e aminotransferases continue to trend down but bilirubin trends up. Initial transamnitis likley 2/2 CBD obstruction. Improing.     Recommendations  Pt will go for repeat ERCP today.   Recommend input from surgery whether needs CCY inpatient vs outpatient given recurrent cholelithiasis.

## 2017-07-28 NOTE — PROGRESS NOTE ADULT - SUBJECTIVE AND OBJECTIVE BOX
Manny Dale MD  Cell: [phone]  Pager: [phone]    S:  No acute events overnight.  Currently in no pain or discomfort.    VITAL SIGNS:  Vital Signs Last 24 Hrs  T(C): 37 (28 Jul 2017 04:32), Max: 37 (28 Jul 2017 04:32)  T(F): 98.6 (28 Jul 2017 04:32), Max: 98.6 (28 Jul 2017 04:32)  HR: 67 (28 Jul 2017 04:32) (67 - 99)  BP: 126/70 (28 Jul 2017 04:32) (121/84 - 126/79)  BP(mean): --  RR: 18 (28 Jul 2017 04:32) (16 - 18)  SpO2: 96% (28 Jul 2017 04:32) (96% - 100%)      PHYSICAL EXAM:  GENERAL: NAD, well-developed  EYES: EOMI, PERRL, scleral icterus present  CHEST/LUNG: Clear to auscultation bilaterally; No wheezes, rales, or rhonchi  HEART: Regular rate and rhythm; No murmurs, rubs, or gallops  ABDOMEN: Soft, Nontender, Nondistended; Bowel sounds present  EXTREMITIES:  2+ Peripheral Pulses, No clubbing, cyanosis, or edema  NEUROLOGY: A&O x3  SKIN: eczema, jaundice    07-28    134<L>  |  96  |  24<H>  ----------------------------<  121<H>  3.8   |  26  |  0.92    Ca    9.6      28 Jul 2017 06:37    TPro  7.1  /  Alb  3.7  /  TBili  12.5<H>  /  DBili  x   /  AST  166<H>  /  ALT  439<H>  /  AlkPhos  281<H>  07-28      CAPILLARY BLOOD GLUCOSE          MEDICATIONS  (STANDING):  sodium chloride 0.9%. 1000 milliLiter(s) (75 mL/Hr) IV Continuous <Continuous>

## 2017-07-28 NOTE — PROGRESS NOTE ADULT - PROBLEM SELECTOR PLAN 3
- Prior concern of portal vein thrombosis on U/S, however no evidence of thrombosis on MRI imaging, heparin discontinued  - Lupus anticoag + with elevated AT3, also elevated s/c ratio and increased silica clotting time x2  - f/u with heme 12 weeks from discharge for repeat blood work but at this point there is no need for anticoagulation.

## 2017-07-28 NOTE — PROGRESS NOTE ADULT - ATTENDING COMMENTS
Patient with bile duct stones responsible for jaundice.  will need consideration of cholecystectomy in near future to assure no recurrence of stone migration to common duct.

## 2017-07-28 NOTE — PROGRESS NOTE ADULT - SUBJECTIVE AND OBJECTIVE BOX
Chief Complaint:  Patient is a 32y old  Male who presents with a chief complaint of R Epigastric pain (22 Jul 2017 17:38)      Interval Events:   No overnight events. Pt afebrile. Pt denies abdominal pain, nausea/vomiting or diarrhea.    Allergies:  No Known Allergies      Hospital Medications:  sodium chloride 0.9%. 1000 milliLiter(s) IV Continuous <Continuous>      PMHX/PSHX:  Eczema, unspecified type  No pertinent past medical history  No significant past surgical history      Family history:  Family history of gallbladder disease (Father, Grandparent)  Family history of coronary artery disease in mother (Mother)      ROS:     General:  No wt loss, fevers, chills, night sweats, fatigue,   Eyes:  Good vision, no reported pain  ENT:  No sore throat, pain, runny nose, dysphagia  CV:  No pain, palpitations, hypo/hypertension  Resp:  No dyspnea, cough, tachypnea, wheezing  GI:  See HPI  :  No pain, bleeding, incontinence, nocturia  Muscle:  No pain, weakness  Neuro:  No weakness, tingling, memory problems  Psych:  No fatigue, insomnia, mood problems, depression  Endocrine:  No polyuria, polydipsia, cold/heat intolerance  Heme:  No petechiae, ecchymosis, easy bruisability  Skin:  No rash, edema      PHYSICAL EXAM:     GENERAL:  Appears stated age, well-groomed, well-nourished, no distress  HEENT:  NC/AT,  conjunctivae clear, sclera -anicteric  CHEST:  Full & symmetric excursion, no increased effort, breath sounds clear  HEART:  Regular rhythm, S1, S2, no murmur/rub/S3/S4,  no edema  ABDOMEN:  Soft, non-tender, non-distended, normoactive bowel sounds,  no masses ,no hepato-splenomegaly,   EXTREMITIES:  no cyanosis,clubbing or edema  SKIN:  No rash/erythema/ecchymoses/petechiae/wounds/abscess/warm/dry  NEURO:  Alert, oriented    Vital Signs:  Vital Signs Last 24 Hrs  T(C): 37 (28 Jul 2017 04:32), Max: 37 (28 Jul 2017 04:32)  T(F): 98.6 (28 Jul 2017 04:32), Max: 98.6 (28 Jul 2017 04:32)  HR: 67 (28 Jul 2017 04:32) (67 - 99)  BP: 126/70 (28 Jul 2017 04:32) (121/84 - 126/79)  BP(mean): --  RR: 18 (28 Jul 2017 04:32) (16 - 18)  SpO2: 96% (28 Jul 2017 04:32) (96% - 100%)  Daily     Daily     LABS:    07-28    134<L>  |  96  |  24<H>  ----------------------------<  121<H>  3.8   |  26  |  0.92    Ca    9.6      28 Jul 2017 06:37    TPro  7.1  /  Alb  3.7  /  TBili  12.5<H>  /  DBili  x   /  AST  166<H>  /  ALT  439<H>  /  AlkPhos  281<H>  07-28    LIVER FUNCTIONS - ( 28 Jul 2017 06:37 )  Alb: 3.7 g/dL / Pro: 7.1 g/dL / ALK PHOS: 281 U/L / ALT: 439 U/L RC / AST: 166 U/L / GGT: x           PT/INR - ( 28 Jul 2017 06:37 )   PT: 10.8 sec;   INR: 1.00 ratio         PTT - ( 28 Jul 2017 06:37 )  PTT:32.3 sec        Imaging:  < from: MRI Abdomen w/o Cont (07.26.17 @ 18:41) >  FINDINGS:    LOWER CHEST: Within normal limits.    LIVER: Enlarged liver with hepatic steatosis.   BILE DUCTS: Persistent progressive dilatation of intrahepatic and   extrahepatic biliary ducts. The CBD measures up to 1 cm in diameter with   narrowing distally. Choledocholithiasis with 4 mm filling defect within   the distal CBD, image 25 series 8.  GALLBLADDER: Cholelithiasis. Mildly distended gallbladder.  SPLEEN: Within normal limits.  PANCREAS: Within normal limits.  ADRENALS: Within normal limits.  KIDNEYS/URETERS: Within normal limits.    VISUALIZED PORTIONS:    BOWEL: Within normal limits.  PERITONEUM: No ascites.  VESSELS: No portal vein thrombus.  RETROPERITONEUM: No lymphadenopathy.  ABDOMINAL WALL: Within normal limits.  BONES: Within normal limits.    IMPRESSION    Cholelithiasis and choledocholithiasis. Persistent progressive dilatation   of intrahepatic and extrahepatic biliary ducts with 4 mm calculus at the   distal CBD.    < end of copied text >

## 2017-07-28 NOTE — PROGRESS NOTE ADULT - PROBLEM SELECTOR PLAN 1
- s/p ERCP, trending LFTs (still elevated). Tbili and alk phos both not improving.  AST and ALT improving.  - Surgery (Dr. Trevizo) will f/u w/ patient 6 weeks from discharge for discussion of elective cholecystectomy - LFTs still elevated with little to no improvement of alk phos and T Bili  - Consulted surgery to see pt again and do cholecystectomy while inpt at the request of gastroenterology - if not pt is planned to see surgery (Dr. Trevizo) 6 weeks from discharge for discussion of cholecystectomy

## 2017-07-29 LAB
ALBUMIN SERPL ELPH-MCNC: 3.7 G/DL — SIGNIFICANT CHANGE UP (ref 3.3–5)
ALP SERPL-CCNC: 314 U/L — HIGH (ref 40–120)
ALT FLD-CCNC: 390 U/L RC — HIGH (ref 10–45)
ANION GAP SERPL CALC-SCNC: 14 MMOL/L — SIGNIFICANT CHANGE UP (ref 5–17)
APTT BLD: 33.2 SEC — SIGNIFICANT CHANGE UP (ref 27.5–37.4)
AST SERPL-CCNC: 109 U/L — HIGH (ref 10–40)
BILIRUB DIRECT SERPL-MCNC: 4.7 MG/DL — HIGH (ref 0–0.2)
BILIRUB SERPL-MCNC: 8.4 MG/DL — HIGH (ref 0.2–1.2)
BUN SERPL-MCNC: 14 MG/DL — SIGNIFICANT CHANGE UP (ref 7–23)
CALCIUM SERPL-MCNC: 9.6 MG/DL — SIGNIFICANT CHANGE UP (ref 8.4–10.5)
CHLORIDE SERPL-SCNC: 98 MMOL/L — SIGNIFICANT CHANGE UP (ref 96–108)
CO2 SERPL-SCNC: 25 MMOL/L — SIGNIFICANT CHANGE UP (ref 22–31)
CREAT SERPL-MCNC: 0.86 MG/DL — SIGNIFICANT CHANGE UP (ref 0.5–1.3)
GLUCOSE SERPL-MCNC: 109 MG/DL — HIGH (ref 70–99)
INR BLD: 1.06 RATIO — SIGNIFICANT CHANGE UP (ref 0.88–1.16)
POTASSIUM SERPL-MCNC: 3.9 MMOL/L — SIGNIFICANT CHANGE UP (ref 3.5–5.3)
POTASSIUM SERPL-SCNC: 3.9 MMOL/L — SIGNIFICANT CHANGE UP (ref 3.5–5.3)
PROT SERPL-MCNC: 7.3 G/DL — SIGNIFICANT CHANGE UP (ref 6–8.3)
PROTHROM AB SERPL-ACNC: 11.6 SEC — SIGNIFICANT CHANGE UP (ref 9.8–12.7)
SODIUM SERPL-SCNC: 137 MMOL/L — SIGNIFICANT CHANGE UP (ref 135–145)

## 2017-07-29 PROCEDURE — 99232 SBSQ HOSP IP/OBS MODERATE 35: CPT | Mod: GC

## 2017-07-29 RX ORDER — CIPROFLOXACIN LACTATE 400MG/40ML
500 VIAL (ML) INTRAVENOUS EVERY 12 HOURS
Qty: 0 | Refills: 0 | Status: DISCONTINUED | OUTPATIENT
Start: 2017-07-29 | End: 2017-08-01

## 2017-07-29 RX ADMIN — Medication 500 MILLIGRAM(S): at 17:47

## 2017-07-29 NOTE — PROGRESS NOTE ADULT - ASSESSMENT
Impression:  1. CBD stone and evidence of cholangitis on ERCP- s/ sphincteroplasty and balloon sweep.    Plan:  1. C/w atbx  2. May adv diet if tolerating  3. Trend CBC, CMP Impression:  1. CBD stone and evidence of cholangitis on ERCP- s/p complete stone extraction by biliary sphincteroplasty and balloon sweep yesterday.  2. Jaundice, improved after ERCP.    Plan:  1. Antibiotics.  2. May advance diet if tolerating  3. Trend CBC, CMP  4. Cholecystectomy, timing to be determined by surgical team.

## 2017-07-29 NOTE — PROGRESS NOTE ADULT - PROBLEM SELECTOR PLAN 3
- Lupus anticoag + with elevated AT3, also elevated s/c ratio and increased silica clotting time x2  - f/u with heme 12 weeks from discharge for repeat blood work but at this point there is no need for anticoagulation.

## 2017-07-29 NOTE — PROGRESS NOTE ADULT - SUBJECTIVE AND OBJECTIVE BOX
Patient is a 32y old  Male who presents with a chief complaint of R Epigastric pain (22 Jul 2017 17:38)      SUBJECTIVE / OVERNIGHT EVENTS:  Pt doing well, no events overnight. Pt tolerated ERCP well, asking to advance diet. No abdominal pain or fevers.      Constitutional: denies fevers, chills, night sweats, weight loss  HEENT: denies visual changes, hearing changes, rhinitis, odynophagia, or dysphagia  Cardiovascular: denies palpitations, chest pain, edema  Respiratory: denies SOB, wheezing  Gastrointestinal: denies N/V/D, abdominal pain, hematochezia, melena  : denies dysuria, hematuria  MSK: denies weakness, joint pain  Skin: denies new rashes or masses      MEDICATIONS  (STANDING):  sodium chloride 0.9%. 1000 milliLiter(s) (75 mL/Hr) IV Continuous <Continuous>  ciprofloxacin     Tablet 500 milliGRAM(s) Oral every 12 hours    I&O's Summary      PHYSICAL EXAM:  GENERAL: NAD, well-developed  HEAD:  Atraumatic, Normocephalic  EYES: EOMI, PERRLA, conjunctiva and sclera clear  NECK: Supple, No JVD  CHEST/LUNG: Clear to auscultation bilaterally; No wheeze  HEART: Regular rate and rhythm; No murmurs, rubs, or gallops  ABDOMEN: Soft, Nontender, Nondistended; Bowel sounds present  EXTREMITIES:  2+ Peripheral Pulses, No clubbing, cyanosis, or edema  PSYCH: AAOx3  NEUROLOGY: non-focal  SKIN: No rashes or lesions    LABS:    07-29    137  |  98  |  14  ----------------------------<  109<H>  3.9   |  25  |  0.86    Ca    9.6      29 Jul 2017 06:38    TPro  7.3  /  Alb  3.7  /  TBili  8.4<H>  /  DBili  4.7<H>  /  AST  109<H>  /  ALT  390<H>  /  AlkPhos  314<H>  07-29    PT/INR - ( 29 Jul 2017 06:38 )   PT: 11.6 sec;   INR: 1.06 ratio         PTT - ( 29 Jul 2017 06:38 )  PTT:33.2 sec          RADIOLOGY & ADDITIONAL TESTS:    Imaging Personally Reviewed:    Consultant(s) Notes Reviewed:      Care Discussed with Consultants/Other Providers: Patient is a 32y old  Male who presents with a chief complaint of R Epigastric pain (22 Jul 2017 17:38)      SUBJECTIVE / OVERNIGHT EVENTS:  Pt doing well, no events overnight. Pt tolerated ERCP well, asking to advance diet. No abdominal pain or fevers.      Constitutional: denies fevers, chills, night sweats, weight loss  HEENT: denies visual changes, hearing changes, rhinitis, odynophagia, or dysphagia  Cardiovascular: denies palpitations, chest pain, edema  Respiratory: denies SOB, wheezing  Gastrointestinal: denies N/V/D, abdominal pain, hematochezia, melena  : denies dysuria, hematuria  MSK: denies weakness, joint pain  Skin: denies new rashes or masses      MEDICATIONS  (STANDING):  sodium chloride 0.9%. 1000 milliLiter(s) (75 mL/Hr) IV Continuous <Continuous>  ciprofloxacin     Tablet 500 milliGRAM(s) Oral every 12 hours    I&O's Summary      PHYSICAL EXAM:  GENERAL: NAD, well-developed, +jaundice  HEAD:  Atraumatic, Normocephalic  EYES: EOMI, PERRLA, +scleral icterus  NECK: Supple, No JVD  CHEST/LUNG: Clear to auscultation bilaterally; No wheeze  HEART: Regular rate and rhythm; No murmurs, rubs, or gallops  ABDOMEN: Soft, Nontender, Nondistended; Bowel sounds present  EXTREMITIES:  2+ Peripheral Pulses, No clubbing, cyanosis, or edema  PSYCH: AAOx3  NEUROLOGY: non-focal  LABS:    07-29    137  |  98  |  14  ----------------------------<  109<H>  3.9   |  25  |  0.86    Ca    9.6      29 Jul 2017 06:38    TPro  7.3  /  Alb  3.7  /  TBili  8.4<H>  /  DBili  4.7<H>  /  AST  109<H>  /  ALT  390<H>  /  AlkPhos  314<H>  07-29    PT/INR - ( 29 Jul 2017 06:38 )   PT: 11.6 sec;   INR: 1.06 ratio         PTT - ( 29 Jul 2017 06:38 )  PTT:33.2 sec          RADIOLOGY & ADDITIONAL TESTS:    Imaging Personally Reviewed:    Consultant(s) Notes Reviewed:      Care Discussed with Consultants/Other Providers: Patient is a 32y old  Male who presents with a chief complaint of R Epigastric pain (22 Jul 2017 17:38)      SUBJECTIVE / OVERNIGHT EVENTS:  Pt doing well, no events overnight. Pt tolerated ERCP well, asking to advance diet. No abdominal pain or fevers.    Vital Signs Last 24 Hrs  T(C): 36.4 (29 Jul 2017 12:32), Max: 36.8 (28 Jul 2017 20:43)  T(F): 97.5 (29 Jul 2017 12:32), Max: 98.2 (28 Jul 2017 20:43)  HR: 62 (29 Jul 2017 12:32) (62 - 101)  BP: 124/67 (29 Jul 2017 12:32) (124/67 - 137/75)  BP(mean): --  RR: 18 (29 Jul 2017 12:32) (18 - 20)  SpO2: 99% (29 Jul 2017 12:32) (97% - 99%)      Constitutional: denies fevers, chills, night sweats, weight loss  HEENT: denies visual changes, hearing changes, rhinitis, odynophagia, or dysphagia  Cardiovascular: denies palpitations, chest pain, edema  Respiratory: denies SOB, wheezing  Gastrointestinal: denies N/V/D, abdominal pain, hematochezia, melena  : denies dysuria, hematuria  MSK: denies weakness, joint pain  Skin: denies new rashes or masses      MEDICATIONS  (STANDING):  sodium chloride 0.9%. 1000 milliLiter(s) (75 mL/Hr) IV Continuous <Continuous>  ciprofloxacin     Tablet 500 milliGRAM(s) Oral every 12 hours    I&O's Summary      PHYSICAL EXAM:  GENERAL: NAD, well-developed, +jaundice  HEAD:  Atraumatic, Normocephalic  EYES: EOMI, PERRLA, +scleral icterus  NECK: Supple, No JVD  CHEST/LUNG: Clear to auscultation bilaterally; No wheeze  HEART: Regular rate and rhythm; No murmurs, rubs, or gallops  ABDOMEN: Soft, Nontender, Nondistended; Bowel sounds present  EXTREMITIES:  2+ Peripheral Pulses, No clubbing, cyanosis, or edema  PSYCH: AAOx3  NEUROLOGY: non-focal  LABS:    07-29    137  |  98  |  14  ----------------------------<  109<H>  3.9   |  25  |  0.86    Ca    9.6      29 Jul 2017 06:38    TPro  7.3  /  Alb  3.7  /  TBili  8.4<H>  /  DBili  4.7<H>  /  AST  109<H>  /  ALT  390<H>  /  AlkPhos  314<H>  07-29    PT/INR - ( 29 Jul 2017 06:38 )   PT: 11.6 sec;   INR: 1.06 ratio         PTT - ( 29 Jul 2017 06:38 )  PTT:33.2 sec          RADIOLOGY & ADDITIONAL TESTS:    Imaging Personally Reviewed:    Consultant(s) Notes Reviewed:      Care Discussed with Consultants/Other Providers:

## 2017-07-29 NOTE — PROGRESS NOTE ADULT - PROBLEM SELECTOR PLAN 1
- LFTs still elevated but trending down  - Bili downtrending from 12->8 s/p ERDP with sphincteroplasty and ballonangio  - Consulted surgery to see pt again and do cholecystectomy while inpt at the request of gastroenterology - if not pt is planned to see surgery (Dr. Trevizo) 6 weeks from discharge for discussion of cholecystectomy - LFTs still elevated but trending down  - Bili downtrending from 12->8 s/p ERCP with sphincteroplasty and ballonangio  - reconsulted surgery --> cholecystectomy planned for monday

## 2017-07-29 NOTE — PROGRESS NOTE ADULT - SUBJECTIVE AND OBJECTIVE BOX
Chief Complaint:  Patient is a 32y old  Male who presents with a chief complaint of R Epigastric pain (22 Jul 2017 17:38)      Interval Events: Pt is s/p - Choledocholithiasis and cholangitis s/p complete extraction of stone/sludge                        by sphincteroplasty and balloon sweeps.                       - Mild post-sphincteroplasty bleeding s/p hemostasis with duraclips x2 at the                        apex.                        He feels well today. No abd pain. Tolerating liquids.      Allergies:  No Known Allergies      Hospital Medications:  sodium chloride 0.9%. 1000 milliLiter(s) IV Continuous <Continuous>  ciprofloxacin     Tablet 500 milliGRAM(s) Oral every 12 hours      PMHX/PSHX:  Eczema, unspecified type  No pertinent past medical history  No significant past surgical history      Family history:  Family history of gallbladder disease (Father, Grandparent)  Family history of coronary artery disease in mother (Mother)      ROS:     General:  No fevers, chills, fatigue,   Eyes:  Good vision  ENT:  No odynophagia, dysphagia  CV:  No pain, palpitations, hypo/hypertension  Resp:  No dyspnea, cough, tachypnea, wheezing  GI:  See HPI  :  No pain, bleeding, incontinence, nocturia  Muscle:  No pain, weakness  Neuro:  No weakness, memory problems  Psych:  No fatigue, insomnia, mood problems, depression  Heme:  No petechiae, ecchymosis, easy bruisability  Skin:  No rash, edema      PHYSICAL EXAM:     GENERAL:  No distress  HEENT: conjunctivae clear, sclera -anicteric  CHEST:  Full & symmetric excursion, no increased effort, breath sounds clear  HEART:  Regular rhythm  ABDOMEN:  Soft, non-tender, non-distended, normoactive bowel sounds,  no masses ,no hepato-splenomegaly  EXTREMITIES:  no edema  SKIN:  No erythema, wounds  NEURO:  Alert, oriented    Vital Signs:  Vital Signs Last 24 Hrs  T(C): 36.7 (29 Jul 2017 04:50), Max: 36.8 (28 Jul 2017 20:43)  T(F): 98.1 (29 Jul 2017 04:50), Max: 98.2 (28 Jul 2017 20:43)  HR: 71 (29 Jul 2017 04:50) (71 - 101)  BP: 137/75 (29 Jul 2017 04:50) (123/84 - 137/75)  BP(mean): --  RR: 18 (29 Jul 2017 04:50) (18 - 20)  SpO2: 97% (29 Jul 2017 04:50) (96% - 97%)  Daily     Daily     LABS:    07-29    137  |  98  |  14  ----------------------------<  109<H>  3.9   |  25  |  0.86    Ca    9.6      29 Jul 2017 06:38    TPro  7.3  /  Alb  3.7  /  TBili  8.4<H>  /  DBili  4.7<H>  /  AST  109<H>  /  ALT  390<H>  /  AlkPhos  314<H>  07-29    LIVER FUNCTIONS - ( 29 Jul 2017 06:38 )  Alb: 3.7 g/dL / Pro: 7.3 g/dL / ALK PHOS: 314 U/L / ALT: 390 U/L RC / AST: 109 U/L / GGT: x           PT/INR - ( 29 Jul 2017 06:38 )   PT: 11.6 sec;   INR: 1.06 ratio         PTT - ( 29 Jul 2017 06:38 )  PTT:33.2 sec        Imaging: Chief Complaint:  Patient is a 32y old  Male who presents with a chief complaint of R Epigastric pain (22 Jul 2017 17:38)      Interval Events: Pt is s/p - Choledocholithiasis and cholangitis s/p complete extraction of stone/sludge                        by sphincteroplasty and balloon sweeps.                       - Mild post-sphincteroplasty bleeding s/p hemostasis with duraclips x2 at the                        apex.                        He feels well today. No abd pain/n/v. Tolerating liquids.      Allergies:  No Known Allergies      Hospital Medications:  sodium chloride 0.9%. 1000 milliLiter(s) IV Continuous <Continuous>  ciprofloxacin     Tablet 500 milliGRAM(s) Oral every 12 hours      PMHX/PSHX:  Eczema, unspecified type  No pertinent past medical history  No significant past surgical history      Family history:  Family history of gallbladder disease (Father, Grandparent)  Family history of coronary artery disease in mother (Mother)      ROS:     General:  No fevers, chills, fatigue,   Eyes:  Good vision  ENT:  No odynophagia, dysphagia  CV:  No pain, palpitations, hypo/hypertension  Resp:  No dyspnea, cough, tachypnea, wheezing  GI:  See HPI  :  No pain, bleeding, incontinence, nocturia  Muscle:  No pain, weakness  Neuro:  No weakness, memory problems  Psych:  No fatigue, insomnia, mood problems, depression  Heme:  No petechiae, ecchymosis, easy bruisability  Skin:  No rash, edema      PHYSICAL EXAM:     GENERAL:  No distress  HEENT: conjunctivae clear, sclera -anicteric  CHEST:  Full & symmetric excursion, no increased effort, breath sounds clear  HEART:  Regular rhythm  ABDOMEN:  Soft, non-tender, non-distended, normoactive bowel sounds,  no masses ,no hepato-splenomegaly  EXTREMITIES:  no edema  SKIN:  No erythema, wounds  NEURO:  Alert, oriented    Vital Signs:  Vital Signs Last 24 Hrs  T(C): 36.7 (29 Jul 2017 04:50), Max: 36.8 (28 Jul 2017 20:43)  T(F): 98.1 (29 Jul 2017 04:50), Max: 98.2 (28 Jul 2017 20:43)  HR: 71 (29 Jul 2017 04:50) (71 - 101)  BP: 137/75 (29 Jul 2017 04:50) (123/84 - 137/75)  BP(mean): --  RR: 18 (29 Jul 2017 04:50) (18 - 20)  SpO2: 97% (29 Jul 2017 04:50) (96% - 97%)  Daily     Daily     LABS:    07-29    137  |  98  |  14  ----------------------------<  109<H>  3.9   |  25  |  0.86    Ca    9.6      29 Jul 2017 06:38    TPro  7.3  /  Alb  3.7  /  TBili  8.4<H>  /  DBili  4.7<H>  /  AST  109<H>  /  ALT  390<H>  /  AlkPhos  314<H>  07-29    LIVER FUNCTIONS - ( 29 Jul 2017 06:38 )  Alb: 3.7 g/dL / Pro: 7.3 g/dL / ALK PHOS: 314 U/L / ALT: 390 U/L RC / AST: 109 U/L / GGT: x           PT/INR - ( 29 Jul 2017 06:38 )   PT: 11.6 sec;   INR: 1.06 ratio         PTT - ( 29 Jul 2017 06:38 )  PTT:33.2 sec        Imaging:

## 2017-07-30 LAB
ALBUMIN SERPL ELPH-MCNC: 3.5 G/DL — SIGNIFICANT CHANGE UP (ref 3.3–5)
ALP SERPL-CCNC: 259 U/L — HIGH (ref 40–120)
ALT FLD-CCNC: 399 U/L RC — HIGH (ref 10–45)
ANION GAP SERPL CALC-SCNC: 12 MMOL/L — SIGNIFICANT CHANGE UP (ref 5–17)
AST SERPL-CCNC: 159 U/L — HIGH (ref 10–40)
BILIRUB SERPL-MCNC: 5.9 MG/DL — HIGH (ref 0.2–1.2)
BUN SERPL-MCNC: 16 MG/DL — SIGNIFICANT CHANGE UP (ref 7–23)
CALCIUM SERPL-MCNC: 9.4 MG/DL — SIGNIFICANT CHANGE UP (ref 8.4–10.5)
CHLORIDE SERPL-SCNC: 98 MMOL/L — SIGNIFICANT CHANGE UP (ref 96–108)
CO2 SERPL-SCNC: 28 MMOL/L — SIGNIFICANT CHANGE UP (ref 22–31)
CREAT SERPL-MCNC: 0.86 MG/DL — SIGNIFICANT CHANGE UP (ref 0.5–1.3)
GLUCOSE SERPL-MCNC: 90 MG/DL — SIGNIFICANT CHANGE UP (ref 70–99)
POTASSIUM SERPL-MCNC: 4 MMOL/L — SIGNIFICANT CHANGE UP (ref 3.5–5.3)
POTASSIUM SERPL-SCNC: 4 MMOL/L — SIGNIFICANT CHANGE UP (ref 3.5–5.3)
PROT SERPL-MCNC: 7.2 G/DL — SIGNIFICANT CHANGE UP (ref 6–8.3)
SODIUM SERPL-SCNC: 138 MMOL/L — SIGNIFICANT CHANGE UP (ref 135–145)

## 2017-07-30 PROCEDURE — 99232 SBSQ HOSP IP/OBS MODERATE 35: CPT | Mod: GC

## 2017-07-30 RX ORDER — CEFOTETAN DISODIUM 1 G
2 VIAL (EA) INJECTION ONCE
Qty: 0 | Refills: 0 | Status: DISCONTINUED | OUTPATIENT
Start: 2017-07-30 | End: 2017-08-01

## 2017-07-30 RX ORDER — SODIUM CHLORIDE 9 MG/ML
1000 INJECTION, SOLUTION INTRAVENOUS
Qty: 0 | Refills: 0 | Status: DISCONTINUED | OUTPATIENT
Start: 2017-07-30 | End: 2017-07-31

## 2017-07-30 RX ADMIN — Medication 500 MILLIGRAM(S): at 17:57

## 2017-07-30 RX ADMIN — Medication 500 MILLIGRAM(S): at 05:32

## 2017-07-30 NOTE — PROGRESS NOTE ADULT - ASSESSMENT
32 year old man s/p ERCP x 2 with stent  Plan:  -pre-op for tomorrow for possible laparoscopic possible open cholecystectomy  -NPOpMN  -T&S, CBC, CMP, PT/INR and PTT, EKG and CXR  #[id number] 32 year old man with choledocholithiasis s/p ERCP x 2 with stent  Plan:  -pre-op for tomorrow for possible laparoscopic possible open cholecystectomy  -NPOpMN with IVF  -T&S, CBC, CMP, PT/INR and PTT, EKG and CXR  #[id number]

## 2017-07-30 NOTE — PROGRESS NOTE ADULT - SUBJECTIVE AND OBJECTIVE BOX
No acute events overnight. Patient tolerating diet, very minimal soreness in his upper abdomen.    T(C): 36.7 (07-30-17 @ 05:27), Max: 36.7 (07-30-17 @ 05:27)  HR: 62 (07-30-17 @ 05:27) (62 - 93)  BP: 121/77 (07-30-17 @ 05:27) (121/77 - 124/77)  RR: 16 (07-30-17 @ 05:27) (16 - 18)  SpO2: 96% (07-30-17 @ 05:27) (96% - 99%)  Wt(kg): --    General: alert and oriented, NAD  Resp: airway patent, respirations unlabored  CVS: regular rate and rhythm  Abdomen: soft, very minimally tender in RUQ, otherwise nontender, nondistended  Extremities: no edema  Skin: warm, dry, appropriate color      30 Jul 2017 07:58    138    |  98     |  16     ----------------------------<  90     4.0     |  28     |  0.86     Ca    9.4        30 Jul 2017 07:58    TPro  7.2    /  Alb  3.5    /  TBili  5.9    /  DBili  x      /  AST  159    /  ALT  399    /  AlkPhos  259    30 Jul 2017 07:58    LIVER FUNCTIONS - ( 30 Jul 2017 07:58 )  Alb: 3.5 g/dL / Pro: 7.2 g/dL / ALK PHOS: 259 U/L / ALT: 399 U/L RC / AST: 159 U/L / GGT: x           PT/INR - ( 29 Jul 2017 06:38 )   PT: 11.6 sec;   INR: 1.06 ratio         PTT - ( 29 Jul 2017 06:38 )  PTT:33.2 sec  CAPILLARY BLOOD GLUCOSE

## 2017-07-30 NOTE — PROGRESS NOTE ADULT - PROBLEM SELECTOR PLAN 1
- LFTs still elevated - t bili and alk phos lower than yesterday but AST and ALT increased  - s/p ERCP with sphincteroplasty and ballon angio  - reconsulted surgery --> cholecystectomy planned for monday/Tuesday as long as LFTs continue to improve but if not pt may be told to come have the surgery an an outpt - LFTs still elevated - t bili and alk phos lower than yesterday but AST and ALT increased  - s/p ERCP with sphincteroplasty and ballon angio  - reconsulted surgery --> cholecystectomy planned for Monday/Tuesday - NPO after midnight with IVF given and labs ordered as requested by surgery - discussed with surgery the need for a CXR and EKG and they agreed it wasn't necessary

## 2017-07-30 NOTE — PROGRESS NOTE ADULT - SUBJECTIVE AND OBJECTIVE BOX
Manny Dale MD  Cell: [phone]  Pager: [phone]    S:  Pt has mild soreness in the ribs and neck.    VITAL SIGNS:  Vital Signs Last 24 Hrs  T(C): 36.7 (30 Jul 2017 05:27), Max: 36.7 (30 Jul 2017 05:27)  T(F): 98.1 (30 Jul 2017 05:27), Max: 98.1 (30 Jul 2017 05:27)  HR: 62 (30 Jul 2017 05:27) (62 - 93)  BP: 121/77 (30 Jul 2017 05:27) (121/77 - 124/77)  BP(mean): --  RR: 16 (30 Jul 2017 05:27) (16 - 18)  SpO2: 96% (30 Jul 2017 05:27) (96% - 99%)      PHYSICAL EXAM:  GENERAL: NAD, well-developed, + jaundice  HEAD:  Atraumatic, Normocephalic  EYES: EOMI, +scleral icterus  NECK: Supple, No JVD  CHEST/LUNG: Clear to auscultation bilaterally; No wheeze  HEART: RRR; No murmurs, rubs, or gallops  ABDOMEN: Soft, Nontender, Nondistended; Bowel sounds present  EXTREMITIES:  2+ Peripheral Pulses, No clubbing, cyanosis, or edema  PSYCH: AAOx3  NEUROLOGY: non-focal      07-30    138  |  98  |  16  ----------------------------<  90  4.0   |  28  |  0.86    Ca    9.4      30 Jul 2017 07:58    TPro  7.2  /  Alb  3.5  /  TBili  5.9<H>  /  DBili  x   /  AST  159<H>  /  ALT  399<H>  /  AlkPhos  259<H>  07-30        MEDICATIONS  (STANDING):  sodium chloride 0.9%. 1000 milliLiter(s) (75 mL/Hr) IV Continuous <Continuous>  ciprofloxacin     Tablet 500 milliGRAM(s) Oral every 12 hours Mnany Dale MD  Cell: [phone]  Pager: [phone]    S:  Pt has mild soreness in the ribs and neck.    VITAL SIGNS:  Vital Signs Last 24 Hrs  T(C): 36.7 (30 Jul 2017 05:27), Max: 36.7 (30 Jul 2017 05:27)  T(F): 98.1 (30 Jul 2017 05:27), Max: 98.1 (30 Jul 2017 05:27)  HR: 62 (30 Jul 2017 05:27) (62 - 93)  BP: 121/77 (30 Jul 2017 05:27) (121/77 - 124/77)  BP(mean): --  RR: 16 (30 Jul 2017 05:27) (16 - 18)  SpO2: 96% (30 Jul 2017 05:27) (96% - 99%)      PHYSICAL EXAM:  GENERAL: NAD, well-developed, + jaundice  HEAD:  Atraumatic, Normocephalic  EYES: EOMI, minimal scleral icterus  NECK: Supple, No JVD  CHEST/LUNG: Clear to auscultation bilaterally; No wheeze  HEART: RRR; No murmurs, rubs, or gallops  ABDOMEN: Soft, Nontender, Nondistended; Bowel sounds present  EXTREMITIES:  2+ Peripheral Pulses, No clubbing, cyanosis, or edema  PSYCH: AAOx3  NEUROLOGY: non-focal      07-30    138  |  98  |  16  ----------------------------<  90  4.0   |  28  |  0.86    Ca    9.4      30 Jul 2017 07:58    TPro  7.2  /  Alb  3.5  /  TBili  5.9<H>  /  DBili  x   /  AST  159<H>  /  ALT  399<H>  /  AlkPhos  259<H>  07-30        MEDICATIONS  (STANDING):  sodium chloride 0.9%. 1000 milliLiter(s) (75 mL/Hr) IV Continuous <Continuous>  ciprofloxacin     Tablet 500 milliGRAM(s) Oral every 12 hours

## 2017-07-30 NOTE — PROGRESS NOTE ADULT - ASSESSMENT
33 y/o M w/ no sig PMHx p/w R epigastric pain x 3 days and dark urine, found to have US finding of choledocholithiasis.  Continued to have elevated LFTs post-ERCP so had [facility] followed by repeat ERCP with sphincteroplasty and ballon angio.

## 2017-07-31 DIAGNOSIS — K80.31 CALCULUS OF BILE DUCT WITH CHOLANGITIS, UNSPECIFIED, WITH OBSTRUCTION: ICD-10-CM

## 2017-07-31 LAB
ANION GAP SERPL CALC-SCNC: 10 MMOL/L — SIGNIFICANT CHANGE UP (ref 5–17)
APTT BLD: 33.1 SEC — SIGNIFICANT CHANGE UP (ref 27.5–37.4)
BUN SERPL-MCNC: 13 MG/DL — SIGNIFICANT CHANGE UP (ref 7–23)
CALCIUM SERPL-MCNC: 9.8 MG/DL — SIGNIFICANT CHANGE UP (ref 8.4–10.5)
CHLORIDE SERPL-SCNC: 99 MMOL/L — SIGNIFICANT CHANGE UP (ref 96–108)
CO2 SERPL-SCNC: 29 MMOL/L — SIGNIFICANT CHANGE UP (ref 22–31)
CREAT SERPL-MCNC: 0.88 MG/DL — SIGNIFICANT CHANGE UP (ref 0.5–1.3)
GLUCOSE SERPL-MCNC: 122 MG/DL — HIGH (ref 70–99)
HCT VFR BLD CALC: 34.7 % — LOW (ref 39–50)
HGB BLD-MCNC: 11.1 G/DL — LOW (ref 13–17)
INR BLD: 1.01 RATIO — SIGNIFICANT CHANGE UP (ref 0.88–1.16)
MCHC RBC-ENTMCNC: 29.7 PG — SIGNIFICANT CHANGE UP (ref 27–34)
MCHC RBC-ENTMCNC: 31.9 GM/DL — LOW (ref 32–36)
MCV RBC AUTO: 93 FL — SIGNIFICANT CHANGE UP (ref 80–100)
PLATELET # BLD AUTO: 294 K/UL — SIGNIFICANT CHANGE UP (ref 150–400)
POTASSIUM SERPL-MCNC: 3.8 MMOL/L — SIGNIFICANT CHANGE UP (ref 3.5–5.3)
POTASSIUM SERPL-SCNC: 3.8 MMOL/L — SIGNIFICANT CHANGE UP (ref 3.5–5.3)
PROTHROM AB SERPL-ACNC: 11 SEC — SIGNIFICANT CHANGE UP (ref 9.8–12.7)
RBC # BLD: 3.73 M/UL — LOW (ref 4.2–5.8)
RBC # FLD: 13 % — SIGNIFICANT CHANGE UP (ref 10.3–14.5)
SODIUM SERPL-SCNC: 138 MMOL/L — SIGNIFICANT CHANGE UP (ref 135–145)
WBC # BLD: 7.5 K/UL — SIGNIFICANT CHANGE UP (ref 3.8–10.5)
WBC # FLD AUTO: 7.5 K/UL — SIGNIFICANT CHANGE UP (ref 3.8–10.5)

## 2017-07-31 PROCEDURE — 99232 SBSQ HOSP IP/OBS MODERATE 35: CPT | Mod: GC

## 2017-07-31 RX ORDER — SODIUM CHLORIDE 9 MG/ML
1000 INJECTION, SOLUTION INTRAVENOUS
Qty: 0 | Refills: 0 | Status: DISCONTINUED | OUTPATIENT
Start: 2017-07-31 | End: 2017-08-01

## 2017-07-31 RX ADMIN — Medication 500 MILLIGRAM(S): at 17:42

## 2017-07-31 RX ADMIN — Medication 500 MILLIGRAM(S): at 05:33

## 2017-07-31 RX ADMIN — SODIUM CHLORIDE 75 MILLILITER(S): 9 INJECTION, SOLUTION INTRAVENOUS at 00:29

## 2017-07-31 NOTE — PROGRESS NOTE ADULT - PROBLEM SELECTOR PLAN 1
- LFTs still elevated   - s/p ERCP with sphincteroplasty and ballon angio  - reconsulted surgery --> cholecystectomy planned for Monday/Tuesday - NPO after midnight with IVF given and labs ordered as requested by surgery - discussed with surgery the need for a CXR and EKG and they agreed it wasn't necessary - Tbili and alk phos downtrending  - s/p ERCP x2 now with sphincteroplasty and ballon angio  - per surgery cholecystectomy cancelled for today, possible tomorrow - NPO after midnight with IVF given and labs ordered as requested by surgery

## 2017-07-31 NOTE — PROGRESS NOTE ADULT - PROBLEM SELECTOR PLAN 3
- Lupus anticoag + with elevated AT3, also elevated s/c ratio and increased silica clotting time x2  - f/u with heme 12 weeks from discharge for repeat blood work but at this point there is no need for anticoagulation. - AST and ALT increased today but overall trend is downward, likely delayed decrease  -monitor LFT - AST and ALT increased today but overall trend is downward, likely delayed decrease   -monitor LFTs

## 2017-07-31 NOTE — PROGRESS NOTE ADULT - SUBJECTIVE AND OBJECTIVE BOX
Manny Dale MD  Cell: [phone]  Pager: [phone]    S:  No pain or discomfort this AM.  Currently NPO.    VITAL SIGNS:  Vital Signs Last 24 Hrs  T(C): 36.8 (31 Jul 2017 04:53), Max: 36.8 (30 Jul 2017 20:39)  T(F): 98.2 (31 Jul 2017 04:53), Max: 98.3 (30 Jul 2017 20:39)  HR: 64 (31 Jul 2017 04:53) (64 - 82)  BP: 120/76 (31 Jul 2017 04:53) (116/76 - 120/76)  BP(mean): --  RR: 16 (31 Jul 2017 04:53) (16 - 16)  SpO2: 99% (31 Jul 2017 04:53) (96% - 99%)      PHYSICAL EXAM:  GENERAL: NAD, well-developed, + jaundice  HEAD:  Atraumatic, Normocephalic  EYES: EOMI, scleral icterus  NECK: Supple, No JVD  CHEST/LUNG: Clear to auscultation bilaterally; No wheeze  HEART: RRR; No murmurs, rubs, or gallops  SKIN: jaundice  ABDOMEN: Soft, Nontender, Nondistended; Bowel sounds present  EXTREMITIES:  2+ Peripheral Pulses, No clubbing, cyanosis, or edema  PSYCH: AAOx3  NEUROLOGY: non-focal                          11.1   7.5   )-----------( 294      ( 31 Jul 2017 07:25 )             34.7     07-31    138  |  99  |  13  ----------------------------<  122<H>  3.8   |  29  |  0.88    Ca    9.8      31 Jul 2017 07:25    TPro  7.2  /  Alb  3.5  /  TBili  5.9<H>  /  DBili  x   /  AST  159<H>  /  ALT  399<H>  /  AlkPhos  259<H>  07-30      CAPILLARY BLOOD GLUCOSE          MEDICATIONS  (STANDING):  sodium chloride 0.9%. 1000 milliLiter(s) (75 mL/Hr) IV Continuous <Continuous>  ciprofloxacin     Tablet 500 milliGRAM(s) Oral every 12 hours  cefoTEtan  IVPB 2 Gram(s) IV Intermittent once  dextrose 5% + sodium chloride 0.45%. 1000 milliLiter(s) (75 mL/Hr) IV Continuous <Continuous> Manny Dale MD  Cell: [phone]  Pager: [phone]    S:  No pain or discomfort this AM.  Currently NPO.     VITAL SIGNS:  Vital Signs Last 24 Hrs  T(C): 36.8 (31 Jul 2017 04:53), Max: 36.8 (30 Jul 2017 20:39)  T(F): 98.2 (31 Jul 2017 04:53), Max: 98.3 (30 Jul 2017 20:39)  HR: 64 (31 Jul 2017 04:53) (64 - 82)  BP: 120/76 (31 Jul 2017 04:53) (116/76 - 120/76)  BP(mean): --  RR: 16 (31 Jul 2017 04:53) (16 - 16)  SpO2: 99% (31 Jul 2017 04:53) (96% - 99%)      PHYSICAL EXAM:  GENERAL: NAD, well-developed, + jaundice  HEAD:  Atraumatic, Normocephalic  EYES: EOMI, scleral icterus  NECK: Supple, No JVD  CHEST/LUNG: Clear to auscultation bilaterally; No wheeze  HEART: RRR; No murmurs, rubs, or gallops  SKIN: jaundice  ABDOMEN: Soft, Nontender, Nondistended; Bowel sounds present  EXTREMITIES:  2+ Peripheral Pulses, No clubbing, cyanosis, or edema  PSYCH: AAOx3  NEUROLOGY: non-focal                          11.1   7.5   )-----------( 294      ( 31 Jul 2017 07:25 )             34.7     07-31    138  |  99  |  13  ----------------------------<  122<H>  3.8   |  29  |  0.88    Ca    9.8      31 Jul 2017 07:25    TPro  7.2  /  Alb  3.5  /  TBili  5.9<H>  /  DBili  x   /  AST  159<H>  /  ALT  399<H>  /  AlkPhos  259<H>  07-30      CAPILLARY BLOOD GLUCOSE          MEDICATIONS  (STANDING):  sodium chloride 0.9%. 1000 milliLiter(s) (75 mL/Hr) IV Continuous <Continuous>  ciprofloxacin     Tablet 500 milliGRAM(s) Oral every 12 hours  cefoTEtan  IVPB 2 Gram(s) IV Intermittent once  dextrose 5% + sodium chloride 0.45%. 1000 milliLiter(s) (75 mL/Hr) IV Continuous <Continuous>

## 2017-07-31 NOTE — PROGRESS NOTE ADULT - SUBJECTIVE AND OBJECTIVE BOX
Subjective:   Pt feeling better today since repeat ERCP; no complaint of abd pain or nausea.    Vital Signs Last 24 Hrs  T(C): 36.5 (31 Jul 2017 12:50), Max: 36.8 (30 Jul 2017 20:39)  T(F): 97.7 (31 Jul 2017 12:50), Max: 98.3 (30 Jul 2017 20:39)  HR: 60 (31 Jul 2017 12:50) (60 - 82)  BP: 123/72 (31 Jul 2017 12:50) (116/76 - 123/72)  BP(mean): --  RR: 16 (31 Jul 2017 12:50) (16 - 16)  SpO2: 96% (31 Jul 2017 12:50) (96% - 99%)    I&O's Detail    31 Jul 2017 07:01  -  31 Jul 2017 18:50  --------------------------------------------------------  IN:    dextrose 5% + sodium chloride 0.45%.: 525 mL    Oral Fluid: 320 mL  Total IN: 845 mL    OUT:  Total OUT: 0 mL    Total NET: 845 mL      General: alert and oriented, NAD  Resp: airway patent, respirations unlabored  CVS: regular rate and rhythm  Abdomen: soft, nontender, nondistended. Negative Alarcon's sign  Extremities: no edema  Skin: warm, dry, appropriate color

## 2017-07-31 NOTE — PROGRESS NOTE ADULT - PROBLEM SELECTOR PLAN 4
- DVT: ambulating, low risk  - Diet: low fat.  - Dispo: pending surg c/s - Lupus anticoag + with elevated AT3, also elevated s/c ratio and increased silica clotting time x2  - f/u with heme 12 weeks from discharge for repeat blood work but at this point there is no need for anticoagulation.

## 2017-07-31 NOTE — PROGRESS NOTE ADULT - ATTENDING COMMENTS
Choledocholithiasis s/p ERCP x 2 with stent placement and ballon sweeps  Tbili, alk phos now downtrending, AST/ALT increased today but overall lower then previous few days  Per Surgery, no cholecystectomy today, possibly leticia  c/w antibiotics for cholangitis per GI given pus extraction during ECRP

## 2017-07-31 NOTE — PROGRESS NOTE ADULT - PROBLEM SELECTOR PLAN 2
- AST and ALT had been improving but today they are both higher than they were yesterday. s/p ERCP on 7/28 with pus extracted from bile duct consistent with cholangitis, no fever, or wbc  -Per GI, pt needs antibiotics for at least 7 days - s/p ERCP on 7/28 with pus extracted from bile duct consistent with cholangitis, no fever, or wbc  - Per GI, pt needs antibiotics for at least 7 days (cipro 500 BID)

## 2017-08-01 ENCOUNTER — RESULT REVIEW (OUTPATIENT)
Age: 32
End: 2017-08-01

## 2017-08-01 ENCOUNTER — TRANSCRIPTION ENCOUNTER (OUTPATIENT)
Age: 32
End: 2017-08-01

## 2017-08-01 DIAGNOSIS — K81.2 ACUTE CHOLECYSTITIS WITH CHRONIC CHOLECYSTITIS: ICD-10-CM

## 2017-08-01 LAB
ALBUMIN SERPL ELPH-MCNC: 3.8 G/DL — SIGNIFICANT CHANGE UP (ref 3.3–5)
ALBUMIN SERPL ELPH-MCNC: 4.1 G/DL — SIGNIFICANT CHANGE UP (ref 3.3–5)
ALP SERPL-CCNC: 223 U/L — HIGH (ref 40–120)
ALP SERPL-CCNC: 224 U/L — HIGH (ref 40–120)
ALT FLD-CCNC: 496 U/L RC — HIGH (ref 10–45)
ALT FLD-CCNC: 566 U/L RC — HIGH (ref 10–45)
ANION GAP SERPL CALC-SCNC: 13 MMOL/L — SIGNIFICANT CHANGE UP (ref 5–17)
ANION GAP SERPL CALC-SCNC: 13 MMOL/L — SIGNIFICANT CHANGE UP (ref 5–17)
AST SERPL-CCNC: 201 U/L — HIGH (ref 10–40)
AST SERPL-CCNC: 277 U/L — HIGH (ref 10–40)
BILIRUB SERPL-MCNC: 3.8 MG/DL — HIGH (ref 0.2–1.2)
BILIRUB SERPL-MCNC: 4 MG/DL — HIGH (ref 0.2–1.2)
BUN SERPL-MCNC: 13 MG/DL — SIGNIFICANT CHANGE UP (ref 7–23)
BUN SERPL-MCNC: 16 MG/DL — SIGNIFICANT CHANGE UP (ref 7–23)
CALCIUM SERPL-MCNC: 10.1 MG/DL — SIGNIFICANT CHANGE UP (ref 8.4–10.5)
CALCIUM SERPL-MCNC: 9.8 MG/DL — SIGNIFICANT CHANGE UP (ref 8.4–10.5)
CHLORIDE SERPL-SCNC: 99 MMOL/L — SIGNIFICANT CHANGE UP (ref 96–108)
CHLORIDE SERPL-SCNC: 99 MMOL/L — SIGNIFICANT CHANGE UP (ref 96–108)
CO2 SERPL-SCNC: 26 MMOL/L — SIGNIFICANT CHANGE UP (ref 22–31)
CO2 SERPL-SCNC: 27 MMOL/L — SIGNIFICANT CHANGE UP (ref 22–31)
CREAT SERPL-MCNC: 0.9 MG/DL — SIGNIFICANT CHANGE UP (ref 0.5–1.3)
CREAT SERPL-MCNC: 1.11 MG/DL — SIGNIFICANT CHANGE UP (ref 0.5–1.3)
GLUCOSE SERPL-MCNC: 115 MG/DL — HIGH (ref 70–99)
GLUCOSE SERPL-MCNC: 176 MG/DL — HIGH (ref 70–99)
HCT VFR BLD CALC: 32.4 % — LOW (ref 39–50)
HCT VFR BLD CALC: 36.1 % — LOW (ref 39–50)
HGB BLD-MCNC: 10.8 G/DL — LOW (ref 13–17)
HGB BLD-MCNC: 11.8 G/DL — LOW (ref 13–17)
MCHC RBC-ENTMCNC: 30.4 PG — SIGNIFICANT CHANGE UP (ref 27–34)
MCHC RBC-ENTMCNC: 30.9 PG — SIGNIFICANT CHANGE UP (ref 27–34)
MCHC RBC-ENTMCNC: 32.6 GM/DL — SIGNIFICANT CHANGE UP (ref 32–36)
MCHC RBC-ENTMCNC: 33.4 GM/DL — SIGNIFICANT CHANGE UP (ref 32–36)
MCV RBC AUTO: 92.6 FL — SIGNIFICANT CHANGE UP (ref 80–100)
MCV RBC AUTO: 93.1 FL — SIGNIFICANT CHANGE UP (ref 80–100)
PLATELET # BLD AUTO: 344 K/UL — SIGNIFICANT CHANGE UP (ref 150–400)
PLATELET # BLD AUTO: 362 K/UL — SIGNIFICANT CHANGE UP (ref 150–400)
POTASSIUM SERPL-MCNC: 4.1 MMOL/L — SIGNIFICANT CHANGE UP (ref 3.5–5.3)
POTASSIUM SERPL-MCNC: 4.7 MMOL/L — SIGNIFICANT CHANGE UP (ref 3.5–5.3)
POTASSIUM SERPL-SCNC: 4.1 MMOL/L — SIGNIFICANT CHANGE UP (ref 3.5–5.3)
POTASSIUM SERPL-SCNC: 4.7 MMOL/L — SIGNIFICANT CHANGE UP (ref 3.5–5.3)
PROT SERPL-MCNC: 7.4 G/DL — SIGNIFICANT CHANGE UP (ref 6–8.3)
PROT SERPL-MCNC: 7.4 G/DL — SIGNIFICANT CHANGE UP (ref 6–8.3)
RBC # BLD: 3.5 M/UL — LOW (ref 4.2–5.8)
RBC # BLD: 3.87 M/UL — LOW (ref 4.2–5.8)
RBC # FLD: 13.2 % — SIGNIFICANT CHANGE UP (ref 10.3–14.5)
RBC # FLD: 13.3 % — SIGNIFICANT CHANGE UP (ref 10.3–14.5)
SODIUM SERPL-SCNC: 138 MMOL/L — SIGNIFICANT CHANGE UP (ref 135–145)
SODIUM SERPL-SCNC: 139 MMOL/L — SIGNIFICANT CHANGE UP (ref 135–145)
WBC # BLD: 13.3 K/UL — HIGH (ref 3.8–10.5)
WBC # BLD: 15.4 K/UL — HIGH (ref 3.8–10.5)
WBC # FLD AUTO: 13.3 K/UL — HIGH (ref 3.8–10.5)
WBC # FLD AUTO: 15.4 K/UL — HIGH (ref 3.8–10.5)

## 2017-08-01 PROCEDURE — 88304 TISSUE EXAM BY PATHOLOGIST: CPT | Mod: 26

## 2017-08-01 PROCEDURE — 47562 LAPAROSCOPIC CHOLECYSTECTOMY: CPT | Mod: 22,GC

## 2017-08-01 PROCEDURE — 99232 SBSQ HOSP IP/OBS MODERATE 35: CPT | Mod: GC

## 2017-08-01 RX ORDER — METOCLOPRAMIDE HCL 10 MG
10 TABLET ORAL ONCE
Qty: 0 | Refills: 0 | Status: DISCONTINUED | OUTPATIENT
Start: 2017-08-01 | End: 2017-08-01

## 2017-08-01 RX ORDER — SODIUM CHLORIDE 9 MG/ML
1000 INJECTION, SOLUTION INTRAVENOUS ONCE
Qty: 0 | Refills: 0 | Status: COMPLETED | OUTPATIENT
Start: 2017-08-01 | End: 2017-08-01

## 2017-08-01 RX ORDER — ONDANSETRON 8 MG/1
4 TABLET, FILM COATED ORAL ONCE
Qty: 0 | Refills: 0 | Status: DISCONTINUED | OUTPATIENT
Start: 2017-08-01 | End: 2017-08-01

## 2017-08-01 RX ORDER — HYDROMORPHONE HYDROCHLORIDE 2 MG/ML
0.5 INJECTION INTRAMUSCULAR; INTRAVENOUS; SUBCUTANEOUS
Qty: 0 | Refills: 0 | Status: DISCONTINUED | OUTPATIENT
Start: 2017-08-01 | End: 2017-08-01

## 2017-08-01 RX ORDER — OXYCODONE AND ACETAMINOPHEN 5; 325 MG/1; MG/1
1 TABLET ORAL EVERY 6 HOURS
Qty: 0 | Refills: 0 | Status: DISCONTINUED | OUTPATIENT
Start: 2017-08-01 | End: 2017-08-02

## 2017-08-01 RX ORDER — SODIUM CHLORIDE 9 MG/ML
1000 INJECTION, SOLUTION INTRAVENOUS
Qty: 0 | Refills: 0 | Status: DISCONTINUED | OUTPATIENT
Start: 2017-08-01 | End: 2017-08-02

## 2017-08-01 RX ORDER — DOCUSATE SODIUM 100 MG
100 CAPSULE ORAL THREE TIMES A DAY
Qty: 0 | Refills: 0 | Status: DISCONTINUED | OUTPATIENT
Start: 2017-08-01 | End: 2017-08-04

## 2017-08-01 RX ORDER — HEPARIN SODIUM 5000 [USP'U]/ML
5000 INJECTION INTRAVENOUS; SUBCUTANEOUS EVERY 8 HOURS
Qty: 0 | Refills: 0 | Status: DISCONTINUED | OUTPATIENT
Start: 2017-08-01 | End: 2017-08-04

## 2017-08-01 RX ORDER — HYDROMORPHONE HYDROCHLORIDE 2 MG/ML
0.25 INJECTION INTRAMUSCULAR; INTRAVENOUS; SUBCUTANEOUS
Qty: 0 | Refills: 0 | Status: DISCONTINUED | OUTPATIENT
Start: 2017-08-01 | End: 2017-08-01

## 2017-08-01 RX ORDER — OXYCODONE AND ACETAMINOPHEN 5; 325 MG/1; MG/1
2 TABLET ORAL EVERY 6 HOURS
Qty: 0 | Refills: 0 | Status: DISCONTINUED | OUTPATIENT
Start: 2017-08-01 | End: 2017-08-02

## 2017-08-01 RX ADMIN — SODIUM CHLORIDE 75 MILLILITER(S): 9 INJECTION, SOLUTION INTRAVENOUS at 16:42

## 2017-08-01 RX ADMIN — Medication 500 MILLIGRAM(S): at 05:15

## 2017-08-01 RX ADMIN — SODIUM CHLORIDE 75 MILLILITER(S): 9 INJECTION, SOLUTION INTRAVENOUS at 00:02

## 2017-08-01 RX ADMIN — OXYCODONE AND ACETAMINOPHEN 2 TABLET(S): 5; 325 TABLET ORAL at 19:26

## 2017-08-01 RX ADMIN — Medication 100 MILLIGRAM(S): at 21:55

## 2017-08-01 RX ADMIN — HEPARIN SODIUM 5000 UNIT(S): 5000 INJECTION INTRAVENOUS; SUBCUTANEOUS at 21:54

## 2017-08-01 RX ADMIN — SODIUM CHLORIDE 1000 MILLILITER(S): 9 INJECTION, SOLUTION INTRAVENOUS at 18:36

## 2017-08-01 RX ADMIN — HYDROMORPHONE HYDROCHLORIDE 0.25 MILLIGRAM(S): 2 INJECTION INTRAMUSCULAR; INTRAVENOUS; SUBCUTANEOUS at 14:01

## 2017-08-01 RX ADMIN — OXYCODONE AND ACETAMINOPHEN 2 TABLET(S): 5; 325 TABLET ORAL at 19:56

## 2017-08-01 RX ADMIN — HYDROMORPHONE HYDROCHLORIDE 0.25 MILLIGRAM(S): 2 INJECTION INTRAMUSCULAR; INTRAVENOUS; SUBCUTANEOUS at 16:00

## 2017-08-01 NOTE — PROGRESS NOTE ADULT - PROBLEM SELECTOR PLAN 2
- s/p ERCP on 7/28 with pus extracted from bile duct consistent with cholangitis, no fever, or wbc  - Per GI, pt needs antibiotics for at least 7 days (cipro 500 BID)

## 2017-08-01 NOTE — PROGRESS NOTE ADULT - SUBJECTIVE AND OBJECTIVE BOX
[facility] GENERAL SURGERY POST-OP NOTE    SUBJECTIVE: Pt seen and evaluated at bedside. Resting comfortably in bed. Pain controlled. Denies nausea/vomiting, CP, palpitations, SOB, lightheaded, dizziness. Tolerating PO intake.     Objective:  Gen: NAD, AAOx3  Pulm: b/l chest rise. No work on breathing  Card: normal rate, RR, no m/r/g  Abd: soft, appropriately tender, ND. Dressings CDI. Drain SS    Vital Signs Last 24 Hrs  T(C): 36.8 (01 Aug 2017 21:09), Max: 37.4 (01 Aug 2017 16:00)  T(F): 98.3 (01 Aug 2017 21:09), Max: 99.3 (01 Aug 2017 16:00)  HR: 66 (01 Aug 2017 20:15) (63 - 88)  BP: 107/59 (01 Aug 2017 20:15) (99/61 - 132/60)  BP(mean): 79 (01 Aug 2017 16:15) (79 - 93)  RR: 18 (01 Aug 2017 21:09) (14 - 18)  SpO2: 94% (01 Aug 2017 21:09) (92% - 99%)  I&O's Summary    31 Jul 2017 07:01  -  01 Aug 2017 07:00  --------------------------------------------------------  IN: 1165 mL / OUT: 0 mL / NET: 1165 mL    01 Aug 2017 07:01  -  01 Aug 2017 21:33  --------------------------------------------------------  IN: 1225 mL / OUT: 0 mL / NET: 1225 mL      I&O's Detail    31 Jul 2017 07:01  -  01 Aug 2017 07:00  --------------------------------------------------------  IN:    dextrose 5% + sodium chloride 0.45%.: 525 mL    Oral Fluid: 640 mL  Total IN: 1165 mL    OUT:  Total OUT: 0 mL    Total NET: 1165 mL      01 Aug 2017 07:01  -  01 Aug 2017 21:33  --------------------------------------------------------  IN:    Lactated Ringers IV Bolus: 1000 mL    lactated ringers.: 225 mL  Total IN: 1225 mL    OUT:  Total OUT: 0 mL    Total NET: 1225 mL          MEDICATIONS  (STANDING):  heparin  Injectable 5000 Unit(s) SubCutaneous every 8 hours  lactated ringers. 1000 milliLiter(s) (75 mL/Hr) IV Continuous <Continuous>  docusate sodium 100 milliGRAM(s) Oral three times a day    MEDICATIONS  (PRN):  oxyCODONE    5 mG/acetaminophen 325 mG 1 Tablet(s) Oral every 6 hours PRN Moderate Pain (4 - 6)  oxyCODONE    5 mG/acetaminophen 325 mG 2 Tablet(s) Oral every 6 hours PRN Severe Pain (7 - 10)      LABS:                        10.8   13.3  )-----------( 344      ( 01 Aug 2017 21:29 )             32.4     08-01    138  |  99  |  16  ----------------------------<  176<H>  4.7   |  26  |  1.11    Ca    10.1      01 Aug 2017 14:00    TPro  7.4  /  Alb  4.1  /  TBili  4.0<H>  /  DBili  x   /  AST  277<H>  /  ALT  566<H>  /  AlkPhos  224<H>  08-01    PT/INR - ( 31 Jul 2017 07:25 )   PT: 11.0 sec;   INR: 1.01 ratio         PTT - ( 31 Jul 2017 07:25 )  PTT:33.1 sec      RADIOLOGY & ADDITIONAL STUDIES:

## 2017-08-01 NOTE — PROGRESS NOTE ADULT - SUBJECTIVE AND OBJECTIVE BOX
Manny Dale MD  Cell: [phone]  Pager: [phone]    S:  Pt in no distress today.  No pain or discomfort.        VITAL SIGNS:  Vital Signs Last 24 Hrs  T(C): 36.9 (01 Aug 2017 04:53), Max: 36.9 (31 Jul 2017 21:05)  T(F): 98.4 (01 Aug 2017 04:53), Max: 98.4 (31 Jul 2017 21:05)  HR: 66 (01 Aug 2017 04:53) (60 - 68)  BP: 130/77 (01 Aug 2017 04:53) (118/76 - 130/77)  BP(mean): --  RR: 18 (01 Aug 2017 04:53) (16 - 18)  SpO2: 99% (01 Aug 2017 04:53) (96% - 99%)      PHYSICAL EXAM:  GENERAL: NAD, well-developed, + jaundice  HEAD:  Atraumatic, Normocephalic  EYES: EOMI, scleral icterus  NECK: Supple, No JVD  CHEST/LUNG: Clear to auscultation bilaterally; No wheeze  HEART: RRR; No murmurs, rubs, or gallops  SKIN: jaundice  ABDOMEN: Soft, Nontender, Nondistended; Bowel sounds present  EXTREMITIES:  2+ Peripheral Pulses, No clubbing, cyanosis, or edema  PSYCH: AAOx3  NEUROLOGY: non-focal                        11.1   7.5   )-----------( 294      ( 31 Jul 2017 07:25 )             34.7     08-01    139  |  99  |  13  ----------------------------<  115<H>  4.1   |  27  |  0.90    Ca    9.8      01 Aug 2017 06:47    TPro  7.4  /  Alb  3.8  /  TBili  3.8<H>  /  DBili  2.1<H>  /  AST  201<H>  /  ALT  496<H>  /  AlkPhos  223<H>  08-01      CAPILLARY BLOOD GLUCOSE          MEDICATIONS  (STANDING):  sodium chloride 0.9%. 1000 milliLiter(s) (75 mL/Hr) IV Continuous <Continuous>

## 2017-08-01 NOTE — PROGRESS NOTE ADULT - ASSESSMENT
33 y/o M w/ no sig PMHx p/w R epigastric pain x 3 days and dark urine, found to have US finding of choledocholithiasis.  Continued to have elevated LFTs post-ERCP so had MRC followed by repeat ERCP with sphincteroplasty and ballon angio.  To have cholecystectomy while inpt. 31 y/o M w/ no sig PMHx p/w R epigastric pain x 3 days and dark urine, found to have US finding of choledocholithiasis.  Continued to have elevated LFTs post-ERCP so had MRC followed by repeat ERCP with sphincteroplasty and ballon angio found to have cholangitis.  To have cholecystectomy while inpt.

## 2017-08-01 NOTE — PROGRESS NOTE ADULT - PROBLEM SELECTOR PLAN 1
- Tbili and alk phos downtrending  - s/p ERCP x2 now with sphincteroplasty and ballon angio  - per surgery cholecystectomy today

## 2017-08-01 NOTE — PROGRESS NOTE ADULT - ATTENDING COMMENTS
No o/v events, HD stable  Getting lap chol today  Tbili and alk phos downtrending  C/w antibiotics  F/u surgery and GI recs for possible discharge tomorrow No o/v events, feels well. HD stable  Getting lap chol today  Tbili and alk phos downtrending  C/w antibiotics for cholangitis due to choledocholithiasis now s/p ERCP x 2.  F/u surgery and GI recs for possible discharge tomorrow

## 2017-08-01 NOTE — PROGRESS NOTE ADULT - ASSESSMENT
A/P: 32y Male  s/p   - Pain control  - Strict I/O's  - F/U drain OP  - F/U GI fxn  - OOB/DVT ppx  - AM labs

## 2017-08-02 LAB
ALBUMIN SERPL ELPH-MCNC: 3.3 G/DL — SIGNIFICANT CHANGE UP (ref 3.3–5)
ALP SERPL-CCNC: 182 U/L — HIGH (ref 40–120)
ALT FLD-CCNC: 563 U/L — HIGH (ref 10–45)
ANION GAP SERPL CALC-SCNC: 11 MMOL/L — SIGNIFICANT CHANGE UP (ref 5–17)
AST SERPL-CCNC: 252 U/L — HIGH (ref 10–40)
BILIRUB SERPL-MCNC: 4 MG/DL — HIGH (ref 0.2–1.2)
BUN SERPL-MCNC: 14 MG/DL — SIGNIFICANT CHANGE UP (ref 7–23)
CALCIUM SERPL-MCNC: 9.1 MG/DL — SIGNIFICANT CHANGE UP (ref 8.4–10.5)
CHLORIDE SERPL-SCNC: 97 MMOL/L — SIGNIFICANT CHANGE UP (ref 96–108)
CO2 SERPL-SCNC: 26 MMOL/L — SIGNIFICANT CHANGE UP (ref 22–31)
CREAT SERPL-MCNC: 0.61 MG/DL — SIGNIFICANT CHANGE UP (ref 0.5–1.3)
GLUCOSE SERPL-MCNC: 126 MG/DL — HIGH (ref 70–99)
HCT VFR BLD CALC: 29.2 % — LOW (ref 39–50)
HGB BLD-MCNC: 9.4 G/DL — LOW (ref 13–17)
MCHC RBC-ENTMCNC: 28.5 PG — SIGNIFICANT CHANGE UP (ref 27–34)
MCHC RBC-ENTMCNC: 32.2 GM/DL — SIGNIFICANT CHANGE UP (ref 32–36)
MCV RBC AUTO: 88.5 FL — SIGNIFICANT CHANGE UP (ref 80–100)
PLATELET # BLD AUTO: 372 K/UL — SIGNIFICANT CHANGE UP (ref 150–400)
POTASSIUM SERPL-MCNC: 4.4 MMOL/L — SIGNIFICANT CHANGE UP (ref 3.5–5.3)
POTASSIUM SERPL-SCNC: 4.4 MMOL/L — SIGNIFICANT CHANGE UP (ref 3.5–5.3)
PROT SERPL-MCNC: 6.9 G/DL — SIGNIFICANT CHANGE UP (ref 6–8.3)
RBC # BLD: 3.3 M/UL — LOW (ref 4.2–5.8)
RBC # FLD: 14.9 % — HIGH (ref 10.3–14.5)
SODIUM SERPL-SCNC: 134 MMOL/L — LOW (ref 135–145)
WBC # BLD: 10.97 K/UL — HIGH (ref 3.8–10.5)
WBC # FLD AUTO: 10.97 K/UL — HIGH (ref 3.8–10.5)

## 2017-08-02 PROCEDURE — 99024 POSTOP FOLLOW-UP VISIT: CPT

## 2017-08-02 RX ORDER — OXYCODONE AND ACETAMINOPHEN 5; 325 MG/1; MG/1
2 TABLET ORAL EVERY 4 HOURS
Qty: 0 | Refills: 0 | Status: DISCONTINUED | OUTPATIENT
Start: 2017-08-02 | End: 2017-08-04

## 2017-08-02 RX ORDER — OXYCODONE AND ACETAMINOPHEN 5; 325 MG/1; MG/1
1 TABLET ORAL EVERY 4 HOURS
Qty: 0 | Refills: 0 | Status: DISCONTINUED | OUTPATIENT
Start: 2017-08-02 | End: 2017-08-04

## 2017-08-02 RX ORDER — DEXTROSE MONOHYDRATE, SODIUM CHLORIDE, AND POTASSIUM CHLORIDE 50; .745; 4.5 G/1000ML; G/1000ML; G/1000ML
1000 INJECTION, SOLUTION INTRAVENOUS
Qty: 0 | Refills: 0 | Status: DISCONTINUED | OUTPATIENT
Start: 2017-08-02 | End: 2017-08-03

## 2017-08-02 RX ADMIN — Medication 100 MILLIGRAM(S): at 13:26

## 2017-08-02 RX ADMIN — HEPARIN SODIUM 5000 UNIT(S): 5000 INJECTION INTRAVENOUS; SUBCUTANEOUS at 13:26

## 2017-08-02 RX ADMIN — HEPARIN SODIUM 5000 UNIT(S): 5000 INJECTION INTRAVENOUS; SUBCUTANEOUS at 05:09

## 2017-08-02 RX ADMIN — OXYCODONE AND ACETAMINOPHEN 2 TABLET(S): 5; 325 TABLET ORAL at 07:54

## 2017-08-02 RX ADMIN — OXYCODONE AND ACETAMINOPHEN 2 TABLET(S): 5; 325 TABLET ORAL at 03:35

## 2017-08-02 RX ADMIN — OXYCODONE AND ACETAMINOPHEN 2 TABLET(S): 5; 325 TABLET ORAL at 15:02

## 2017-08-02 RX ADMIN — HEPARIN SODIUM 5000 UNIT(S): 5000 INJECTION INTRAVENOUS; SUBCUTANEOUS at 21:35

## 2017-08-02 RX ADMIN — OXYCODONE AND ACETAMINOPHEN 2 TABLET(S): 5; 325 TABLET ORAL at 15:32

## 2017-08-02 RX ADMIN — Medication 100 MILLIGRAM(S): at 05:09

## 2017-08-02 RX ADMIN — OXYCODONE AND ACETAMINOPHEN 2 TABLET(S): 5; 325 TABLET ORAL at 23:11

## 2017-08-02 RX ADMIN — OXYCODONE AND ACETAMINOPHEN 2 TABLET(S): 5; 325 TABLET ORAL at 08:24

## 2017-08-02 RX ADMIN — OXYCODONE AND ACETAMINOPHEN 2 TABLET(S): 5; 325 TABLET ORAL at 23:41

## 2017-08-02 RX ADMIN — OXYCODONE AND ACETAMINOPHEN 2 TABLET(S): 5; 325 TABLET ORAL at 03:05

## 2017-08-02 RX ADMIN — Medication 100 MILLIGRAM(S): at 21:34

## 2017-08-02 NOTE — PROGRESS NOTE ADULT - ATTENDING COMMENTS
seen and examined 8/2/2017 @ 1020    POD#1 s/p laparoscopic subtotal cholecystectomy for ascending cholangitis  JOHANNA has some bubbles and maybe some minimal bile stain, but he already has ERCP x 2 with sphincterotomy and sphincteroplasty    LFT's improving very slowly, likely from cholangitis, unlikely to have retained CBD stone  -can repeat LFTs as outpatient next week    hgb - 11 preop -> 9.4 today but otherwise stable  -will repeat labs tomorrow and D/C home if hgb stable

## 2017-08-02 NOTE — PROGRESS NOTE ADULT - SUBJECTIVE AND OBJECTIVE BOX
GENERAL SURGERY DAILY PROGRESS NOTE:       Subjective:    MEDICATIONS  (STANDING):  heparin  Injectable 5000 Unit(s) SubCutaneous every 8 hours  lactated ringers. 1000 milliLiter(s) (75 mL/Hr) IV Continuous <Continuous>  docusate sodium 100 milliGRAM(s) Oral three times a day    MEDICATIONS  (PRN):  oxyCODONE    5 mG/acetaminophen 325 mG 1 Tablet(s) Oral every 4 hours PRN Moderate Pain (4 - 6)  oxyCODONE    5 mG/acetaminophen 325 mG 2 Tablet(s) Oral every 4 hours PRN Severe Pain (7 - 10)    Vital Signs Last 24 Hrs  T(C): 37.2 (02 Aug 2017 10:00), Max: 37.4 (01 Aug 2017 16:00)  T(F): 99 (02 Aug 2017 10:00), Max: 99.3 (01 Aug 2017 16:00)  HR: 87 (02 Aug 2017 10:00) (63 - 88)  BP: 112/69 (02 Aug 2017 10:00) (99/61 - 132/60)  BP(mean): 79 (01 Aug 2017 16:15) (79 - 93)  RR: 18 (02 Aug 2017 10:00) (14 - 18)  SpO2: 94% (02 Aug 2017 10:00) (92% - 99%)    I&O's Detail    01 Aug 2017 07:01  -  02 Aug 2017 07:00  --------------------------------------------------------  IN:    Lactated Ringers IV Bolus: 2000 mL    lactated ringers.: 1125 mL  Total IN: 3125 mL    OUT:    Voided: 1100 mL  Total OUT: 1100 mL    Total NET: 2025 mL  02 Aug 2017 07:01  -  02 Aug 2017 11:49  --------------------------------------------------------  IN:    Oral Fluid: 300 mL  Total IN: 300 mL    OUT:  Total OUT: 0 mL    Total NET: 300 mL      Daily     Daily     LABS:                        9.4    10.97 )-----------( 372      ( 02 Aug 2017 08:28 )             29.2     08-02    134<L>  |  97  |  14  ----------------------------<  126<H>  4.4   |  26  |  0.61    Ca    9.1      02 Aug 2017 08:30    TPro  6.9  /  Alb  3.3  /  TBili  4.0<H>  /  DBili  x   /  AST  252<H>  /  ALT  563<H>  /  AlkPhos  182<H>  08-02    32 year old male s/p subtotal colectomy   -SQH for DVT PPX- encouraged ambulation  -Tolerating CLD   -F/U repreat LFTS and CBC in am   -GI follow up   -If offical lab work today is normal will advance diet  -Provide JOHANNA teaching   -Dispo planning       Farheen Noguera PA-C  #[id number] GENERAL SURGERY DAILY PROGRESS NOTE:       Subjective:    MEDICATIONS  (STANDING):  heparin  Injectable 5000 Unit(s) SubCutaneous every 8 hours  lactated ringers. 1000 milliLiter(s) (75 mL/Hr) IV Continuous <Continuous>  docusate sodium 100 milliGRAM(s) Oral three times a day    MEDICATIONS  (PRN):  oxyCODONE    5 mG/acetaminophen 325 mG 1 Tablet(s) Oral every 4 hours PRN Moderate Pain (4 - 6)  oxyCODONE    5 mG/acetaminophen 325 mG 2 Tablet(s) Oral every 4 hours PRN Severe Pain (7 - 10)    Vital Signs Last 24 Hrs  T(C): 37.2 (02 Aug 2017 10:00), Max: 37.4 (01 Aug 2017 16:00)  T(F): 99 (02 Aug 2017 10:00), Max: 99.3 (01 Aug 2017 16:00)  HR: 87 (02 Aug 2017 10:00) (63 - 88)  BP: 112/69 (02 Aug 2017 10:00) (99/61 - 132/60)  BP(mean): 79 (01 Aug 2017 16:15) (79 - 93)  RR: 18 (02 Aug 2017 10:00) (14 - 18)  SpO2: 94% (02 Aug 2017 10:00) (92% - 99%)    I&O's Detail    01 Aug 2017 07:01  -  02 Aug 2017 07:00  --------------------------------------------------------  IN:    Lactated Ringers IV Bolus: 2000 mL    lactated ringers.: 1125 mL  Total IN: 3125 mL    OUT:    Voided: 1100 mL  Total OUT: 1100 mL    Total NET: 2025 mL  02 Aug 2017 07:01  -  02 Aug 2017 11:49  --------------------------------------------------------  IN:    Oral Fluid: 300 mL  Total IN: 300 mL    OUT:  Total OUT: 0 mL    Total NET: 300 mL      Daily     Daily     LABS:                        9.4    10.97 )-----------( 372      ( 02 Aug 2017 08:28 )             29.2     08-02    134<L>  |  97  |  14  ----------------------------<  126<H>  4.4   |  26  |  0.61    Ca    9.1      02 Aug 2017 08:30    TPro  6.9  /  Alb  3.3  /  TBili  4.0<H>  /  DBili  x   /  AST  252<H>  /  ALT  563<H>  /  AlkPhos  182<H>  08-02    32 year old male s/p subtotal colecystectomy   -SQH for DVT PPX- encouraged ambulation  -Tolerating CLD   -F/U repreat LFTS and CBC in am   -GI follow up   -If offical lab work today is normal will advance diet  -Provide JOHANNA teaching   -Dispo planning       Farheen Noguera PA-C  #[id number]

## 2017-08-03 PROBLEM — K81.2 ACUTE CHOLECYSTITIS WITH CHRONIC CHOLECYSTITIS: Status: ACTIVE | Noted: 2017-08-03

## 2017-08-03 LAB
ALBUMIN SERPL ELPH-MCNC: 3.2 G/DL — LOW (ref 3.3–5)
ALP SERPL-CCNC: 165 U/L — HIGH (ref 40–120)
ALT FLD-CCNC: 416 U/L — HIGH (ref 10–45)
ANION GAP SERPL CALC-SCNC: 14 MMOL/L — SIGNIFICANT CHANGE UP (ref 5–17)
AST SERPL-CCNC: 117 U/L — HIGH (ref 10–40)
BILIRUB DIRECT SERPL-MCNC: 1.7 MG/DL — HIGH (ref 0–0.2)
BILIRUB INDIRECT FLD-MCNC: 2.1 MG/DL — HIGH (ref 0.2–1)
BILIRUB SERPL-MCNC: 3.8 MG/DL — HIGH (ref 0.2–1.2)
BUN SERPL-MCNC: 9 MG/DL — SIGNIFICANT CHANGE UP (ref 7–23)
CALCIUM SERPL-MCNC: 9 MG/DL — SIGNIFICANT CHANGE UP (ref 8.4–10.5)
CHLORIDE SERPL-SCNC: 97 MMOL/L — SIGNIFICANT CHANGE UP (ref 96–108)
CO2 SERPL-SCNC: 27 MMOL/L — SIGNIFICANT CHANGE UP (ref 22–31)
CREAT SERPL-MCNC: 0.85 MG/DL — SIGNIFICANT CHANGE UP (ref 0.5–1.3)
GLUCOSE SERPL-MCNC: 118 MG/DL — HIGH (ref 70–99)
HCT VFR BLD CALC: 28.5 % — LOW (ref 39–50)
HGB BLD-MCNC: 9.3 G/DL — LOW (ref 13–17)
MCHC RBC-ENTMCNC: 29 PG — SIGNIFICANT CHANGE UP (ref 27–34)
MCHC RBC-ENTMCNC: 32.6 GM/DL — SIGNIFICANT CHANGE UP (ref 32–36)
MCV RBC AUTO: 88.8 FL — SIGNIFICANT CHANGE UP (ref 80–100)
PLATELET # BLD AUTO: 341 K/UL — SIGNIFICANT CHANGE UP (ref 150–400)
POTASSIUM SERPL-MCNC: 4.2 MMOL/L — SIGNIFICANT CHANGE UP (ref 3.5–5.3)
POTASSIUM SERPL-SCNC: 4.2 MMOL/L — SIGNIFICANT CHANGE UP (ref 3.5–5.3)
PROT SERPL-MCNC: 7.2 G/DL — SIGNIFICANT CHANGE UP (ref 6–8.3)
RBC # BLD: 3.21 M/UL — LOW (ref 4.2–5.8)
RBC # FLD: 14.7 % — HIGH (ref 10.3–14.5)
SODIUM SERPL-SCNC: 138 MMOL/L — SIGNIFICANT CHANGE UP (ref 135–145)
WBC # BLD: 12.02 K/UL — HIGH (ref 3.8–10.5)
WBC # FLD AUTO: 12.02 K/UL — HIGH (ref 3.8–10.5)

## 2017-08-03 PROCEDURE — 99024 POSTOP FOLLOW-UP VISIT: CPT

## 2017-08-03 RX ADMIN — OXYCODONE AND ACETAMINOPHEN 2 TABLET(S): 5; 325 TABLET ORAL at 19:33

## 2017-08-03 RX ADMIN — Medication 100 MILLIGRAM(S): at 21:36

## 2017-08-03 RX ADMIN — HEPARIN SODIUM 5000 UNIT(S): 5000 INJECTION INTRAVENOUS; SUBCUTANEOUS at 21:36

## 2017-08-03 RX ADMIN — Medication 100 MILLIGRAM(S): at 13:12

## 2017-08-03 RX ADMIN — OXYCODONE AND ACETAMINOPHEN 2 TABLET(S): 5; 325 TABLET ORAL at 13:42

## 2017-08-03 RX ADMIN — OXYCODONE AND ACETAMINOPHEN 2 TABLET(S): 5; 325 TABLET ORAL at 13:12

## 2017-08-03 RX ADMIN — OXYCODONE AND ACETAMINOPHEN 2 TABLET(S): 5; 325 TABLET ORAL at 07:30

## 2017-08-03 RX ADMIN — Medication 100 MILLIGRAM(S): at 05:18

## 2017-08-03 RX ADMIN — HEPARIN SODIUM 5000 UNIT(S): 5000 INJECTION INTRAVENOUS; SUBCUTANEOUS at 13:12

## 2017-08-03 RX ADMIN — HEPARIN SODIUM 5000 UNIT(S): 5000 INJECTION INTRAVENOUS; SUBCUTANEOUS at 05:17

## 2017-08-03 RX ADMIN — OXYCODONE AND ACETAMINOPHEN 2 TABLET(S): 5; 325 TABLET ORAL at 20:05

## 2017-08-03 RX ADMIN — OXYCODONE AND ACETAMINOPHEN 2 TABLET(S): 5; 325 TABLET ORAL at 08:00

## 2017-08-03 NOTE — PROGRESS NOTE ADULT - SUBJECTIVE AND OBJECTIVE BOX
STATUS POST:  kera collado    POST OPERATIVE DAY #: 2    Vital Signs Last 24 Hrs  T(C): 37.2 (03 Aug 2017 10:19), Max: 37.3 (02 Aug 2017 14:08)  T(F): 99 (03 Aug 2017 10:19), Max: 99.2 (02 Aug 2017 14:08)  HR: 89 (03 Aug 2017 10:19) (84 - 97)  BP: 110/66 (03 Aug 2017 10:19) (107/69 - 134/76)  BP(mean): --  RR: 18 (03 Aug 2017 10:19) (18 - 18)  SpO2: 94% (03 Aug 2017 10:19) (91% - 94%)    I&O's Summary    02 Aug 2017 07:01  -  03 Aug 2017 07:00  --------------------------------------------------------  IN: 2565 mL / OUT: 2680 mL / NET: -115 mL    03 Aug 2017 07:01  -  03 Aug 2017 12:42  --------------------------------------------------------  IN: 150 mL / OUT: 700 mL / NET: -550 mL        SUBJECTIVE: Pt seen and examined. C/O nausea. Denies pain.         Physical Exam:  General Appearance: Appears well, NAD  Abdomen: Soft, nondistended, appropriate incisional tenderness, dressings clean and dry and intact  Extremities: Grossly symmetric, SCD's in place     LABS:                        9.3    12.02 )-----------( 341      ( 03 Aug 2017 08:51 )             28.5     08-03    138  |  97  |  9   ----------------------------<  118<H>  4.2   |  27  |  0.85    Ca    9.0      03 Aug 2017 08:54    TPro  7.2  /  Alb  3.2<L>  /  TBili  3.8<H>  /  DBili  1.7<H>  /  AST  117<H>  /  ALT  416<H>  /  AlkPhos  165<H>  08-03            Mary Alice Hyatt PA-C  p#[phone]

## 2017-08-03 NOTE — CHART NOTE - NSCHARTNOTEFT_GEN_A_CORE
Source: Patient [X ]    Family [ ]     other [X ] chart/medical record     Diet : Regular    PO intake:  < 50% [ ] 50-75% [ ]   % [ ]  other : Pt diet advanced this morning, observed with breakfast tray at bedside. Reports taking small bites thus far with good tolerance, good appetite. Denies nausea/emesis. + Flatus, No BM as of yet.    Source for PO intake [X ] Patient [ ] family [ ] chart [ ] staff [ ] other    POD#1 s/p laparoscopic subtotal cholecystectomy for ascending cholangitis, noted with JOHANNA.     Current Weight: Weight (kg): 72.575 (07-29 @ 11:21)    Pertinent Medications: MEDICATIONS  (STANDING):  ALBUTerol/ipratropium for Nebulization 3 milliLiter(s) Nebulizer every 4 hours  multivitamin 1 Tablet(s) Oral daily  tamsulosin 0.4 milliGRAM(s) Oral at bedtime  enoxaparin Injectable 40 milliGRAM(s) SubCutaneous every 24 hours  cefTRIAXone   IVPB 2 Gram(s) IV Intermittent every 24 hours    MEDICATIONS  (PRN):    Pertinent Labs:  08-03 Na138 mmol/L Glu 93 mg/dL K+ 3.9 mmol/L Cr  0.48 mg/dL<L> BUN 10 mg/dL Phos 2.2 mg/dL<L>     Skin: No edema, no pressure ulcers.    Estimated Needs:   [X ] no change since previous assessment  [ ] recalculated:       Previous Nutrition Diagnosis: [ X] Food & Nutrition Related Knowledge Deficit        Nutrition Diagnosis is [ ] ongoing  [ ] resolved [X ] no longer applicable       Recommend    [ ] Change Diet To:    [ ] Nutrition Supplement    [ ] Nutrition Support    [X ] Other: Continue current nutrition care plan       Monitoring and Evaluation:     [X ] PO intake [ X] Tolerance to diet prescription [X ] weights [X ] follow up per protocol    [X ] other: RD to remain available for further nutritional interventions as indicated/requested by medical team/pt.

## 2017-08-03 NOTE — PROGRESS NOTE ADULT - ATTENDING COMMENTS
seen and examined 8/3/2017 @ 0908    some dilute bilious drainage from JOHANNA despite preop sphincteroplasty, probably since he has a >1cm diameter cystic duct  LFTs slowly improving  ok to D/C home with JOHANNA and repeat LFTs next week as outpatient  -f/u next week in my office seen and examined 8/3/2017 @ 0908    hgb stable from yesterday @ 9 g/dL  some dilute bilious drainage from JOHANNA despite preop sphincteroplasty, probably since he has a >1cm diameter cystic duct  LFTs slowly improving  ok to D/C home with JOHANNA and repeat LFTs next week as outpatient  -f/u next week in my office

## 2017-08-03 NOTE — PROGRESS NOTE ADULT - ASSESSMENT
33yo M POD 2 s/p lap heaven    Pain control  Advance diet  drain teachings  ambulate  trend LFTs  dispo home today v. tomorrow

## 2017-08-04 VITALS
HEART RATE: 95 BPM | RESPIRATION RATE: 18 BRPM | DIASTOLIC BLOOD PRESSURE: 77 MMHG | TEMPERATURE: 98 F | SYSTOLIC BLOOD PRESSURE: 122 MMHG | OXYGEN SATURATION: 94 %

## 2017-08-04 LAB
ALBUMIN SERPL ELPH-MCNC: 3.7 G/DL — SIGNIFICANT CHANGE UP (ref 3.3–5)
ALP SERPL-CCNC: 155 U/L — HIGH (ref 40–120)
ALT FLD-CCNC: 312 U/L RC — HIGH (ref 10–45)
ANION GAP SERPL CALC-SCNC: 13 MMOL/L — SIGNIFICANT CHANGE UP (ref 5–17)
AST SERPL-CCNC: 58 U/L — HIGH (ref 10–40)
BILIRUB SERPL-MCNC: 3 MG/DL — HIGH (ref 0.2–1.2)
BUN SERPL-MCNC: 12 MG/DL — SIGNIFICANT CHANGE UP (ref 7–23)
CALCIUM SERPL-MCNC: 9.9 MG/DL — SIGNIFICANT CHANGE UP (ref 8.4–10.5)
CHLORIDE SERPL-SCNC: 99 MMOL/L — SIGNIFICANT CHANGE UP (ref 96–108)
CO2 SERPL-SCNC: 26 MMOL/L — SIGNIFICANT CHANGE UP (ref 22–31)
CREAT SERPL-MCNC: 0.85 MG/DL — SIGNIFICANT CHANGE UP (ref 0.5–1.3)
GLUCOSE SERPL-MCNC: 109 MG/DL — HIGH (ref 70–99)
HCT VFR BLD CALC: 31.8 % — LOW (ref 39–50)
HGB BLD-MCNC: 10.2 G/DL — LOW (ref 13–17)
MAGNESIUM SERPL-MCNC: 2.2 MG/DL — SIGNIFICANT CHANGE UP (ref 1.6–2.6)
MCHC RBC-ENTMCNC: 29.7 PG — SIGNIFICANT CHANGE UP (ref 27–34)
MCHC RBC-ENTMCNC: 32.1 GM/DL — SIGNIFICANT CHANGE UP (ref 32–36)
MCV RBC AUTO: 92.5 FL — SIGNIFICANT CHANGE UP (ref 80–100)
PHOSPHATE SERPL-MCNC: 3.2 MG/DL — SIGNIFICANT CHANGE UP (ref 2.5–4.5)
PLATELET # BLD AUTO: 337 K/UL — SIGNIFICANT CHANGE UP (ref 150–400)
POTASSIUM SERPL-MCNC: 4.2 MMOL/L — SIGNIFICANT CHANGE UP (ref 3.5–5.3)
POTASSIUM SERPL-SCNC: 4.2 MMOL/L — SIGNIFICANT CHANGE UP (ref 3.5–5.3)
PROT SERPL-MCNC: 7.5 G/DL — SIGNIFICANT CHANGE UP (ref 6–8.3)
RBC # BLD: 3.44 M/UL — LOW (ref 4.2–5.8)
RBC # FLD: 13.1 % — SIGNIFICANT CHANGE UP (ref 10.3–14.5)
SODIUM SERPL-SCNC: 138 MMOL/L — SIGNIFICANT CHANGE UP (ref 135–145)
WBC # BLD: 11.8 K/UL — HIGH (ref 3.8–10.5)
WBC # FLD AUTO: 11.8 K/UL — HIGH (ref 3.8–10.5)

## 2017-08-04 PROCEDURE — 96375 TX/PRO/DX INJ NEW DRUG ADDON: CPT

## 2017-08-04 PROCEDURE — 86900 BLOOD TYPING SEROLOGIC ABO: CPT

## 2017-08-04 PROCEDURE — 82435 ASSAY OF BLOOD CHLORIDE: CPT

## 2017-08-04 PROCEDURE — 83010 ASSAY OF HAPTOGLOBIN QUANT: CPT

## 2017-08-04 PROCEDURE — 82248 BILIRUBIN DIRECT: CPT

## 2017-08-04 PROCEDURE — 82330 ASSAY OF CALCIUM: CPT

## 2017-08-04 PROCEDURE — C1769: CPT

## 2017-08-04 PROCEDURE — 82947 ASSAY GLUCOSE BLOOD QUANT: CPT

## 2017-08-04 PROCEDURE — 84295 ASSAY OF SERUM SODIUM: CPT

## 2017-08-04 PROCEDURE — 80074 ACUTE HEPATITIS PANEL: CPT

## 2017-08-04 PROCEDURE — 86664 EPSTEIN-BARR NUCLEAR ANTIGEN: CPT

## 2017-08-04 PROCEDURE — 80053 COMPREHEN METABOLIC PANEL: CPT

## 2017-08-04 PROCEDURE — 85303 CLOT INHIBIT PROT C ACTIVITY: CPT

## 2017-08-04 PROCEDURE — 86790 VIRUS ANTIBODY NOS: CPT

## 2017-08-04 PROCEDURE — 86665 EPSTEIN-BARR CAPSID VCA: CPT

## 2017-08-04 PROCEDURE — 80076 HEPATIC FUNCTION PANEL: CPT

## 2017-08-04 PROCEDURE — 84100 ASSAY OF PHOSPHORUS: CPT

## 2017-08-04 PROCEDURE — 88304 TISSUE EXAM BY PATHOLOGIST: CPT

## 2017-08-04 PROCEDURE — 86146 BETA-2 GLYCOPROTEIN ANTIBODY: CPT

## 2017-08-04 PROCEDURE — 74181 MRI ABDOMEN W/O CONTRAST: CPT

## 2017-08-04 PROCEDURE — 85045 AUTOMATED RETICULOCYTE COUNT: CPT

## 2017-08-04 PROCEDURE — 86901 BLOOD TYPING SEROLOGIC RH(D): CPT

## 2017-08-04 PROCEDURE — 86645 CMV ANTIBODY IGM: CPT

## 2017-08-04 PROCEDURE — 85730 THROMBOPLASTIN TIME PARTIAL: CPT

## 2017-08-04 PROCEDURE — 86147 CARDIOLIPIN ANTIBODY EA IG: CPT

## 2017-08-04 PROCEDURE — 86663 EPSTEIN-BARR ANTIBODY: CPT

## 2017-08-04 PROCEDURE — 76705 ECHO EXAM OF ABDOMEN: CPT

## 2017-08-04 PROCEDURE — 85610 PROTHROMBIN TIME: CPT

## 2017-08-04 PROCEDURE — 87521 HEPATITIS C PROBE&RVRS TRNSC: CPT

## 2017-08-04 PROCEDURE — 86038 ANTINUCLEAR ANTIBODIES: CPT

## 2017-08-04 PROCEDURE — 83605 ASSAY OF LACTIC ACID: CPT

## 2017-08-04 PROCEDURE — 85014 HEMATOCRIT: CPT

## 2017-08-04 PROCEDURE — 82787 IGG 1 2 3 OR 4 EACH: CPT

## 2017-08-04 PROCEDURE — 96374 THER/PROPH/DIAG INJ IV PUSH: CPT

## 2017-08-04 PROCEDURE — C1889: CPT

## 2017-08-04 PROCEDURE — 85300 ANTITHROMBIN III ACTIVITY: CPT

## 2017-08-04 PROCEDURE — 83690 ASSAY OF LIPASE: CPT

## 2017-08-04 PROCEDURE — 84132 ASSAY OF SERUM POTASSIUM: CPT

## 2017-08-04 PROCEDURE — 80307 DRUG TEST PRSMV CHEM ANLYZR: CPT

## 2017-08-04 PROCEDURE — 99024 POSTOP FOLLOW-UP VISIT: CPT

## 2017-08-04 PROCEDURE — 86036 ANCA SCREEN EACH ANTIBODY: CPT

## 2017-08-04 PROCEDURE — 86255 FLUORESCENT ANTIBODY SCREEN: CPT

## 2017-08-04 PROCEDURE — 80048 BASIC METABOLIC PNL TOTAL CA: CPT

## 2017-08-04 PROCEDURE — 85027 COMPLETE CBC AUTOMATED: CPT

## 2017-08-04 PROCEDURE — 86381 MITOCHONDRIAL ANTIBODY EACH: CPT

## 2017-08-04 PROCEDURE — C1726: CPT

## 2017-08-04 PROCEDURE — 85306 CLOT INHIBIT PROT S FREE: CPT

## 2017-08-04 PROCEDURE — 86644 CMV ANTIBODY: CPT

## 2017-08-04 PROCEDURE — 99285 EMERGENCY DEPT VISIT HI MDM: CPT | Mod: 25

## 2017-08-04 PROCEDURE — 86376 MICROSOMAL ANTIBODY EACH: CPT

## 2017-08-04 PROCEDURE — 93975 VASCULAR STUDY: CPT

## 2017-08-04 PROCEDURE — 76700 US EXAM ABDOM COMPLETE: CPT

## 2017-08-04 PROCEDURE — 71045 X-RAY EXAM CHEST 1 VIEW: CPT

## 2017-08-04 PROCEDURE — 86850 RBC ANTIBODY SCREEN: CPT

## 2017-08-04 PROCEDURE — 83735 ASSAY OF MAGNESIUM: CPT

## 2017-08-04 PROCEDURE — 82803 BLOOD GASES ANY COMBINATION: CPT

## 2017-08-04 PROCEDURE — 82247 BILIRUBIN TOTAL: CPT

## 2017-08-04 PROCEDURE — 74183 MRI ABD W/O CNTR FLWD CNTR: CPT

## 2017-08-04 RX ORDER — OXYCODONE HYDROCHLORIDE 5 MG/1
1 TABLET ORAL
Qty: 6 | Refills: 0 | OUTPATIENT
Start: 2017-08-04

## 2017-08-04 RX ADMIN — OXYCODONE AND ACETAMINOPHEN 2 TABLET(S): 5; 325 TABLET ORAL at 14:40

## 2017-08-04 RX ADMIN — Medication 100 MILLIGRAM(S): at 05:04

## 2017-08-04 RX ADMIN — OXYCODONE AND ACETAMINOPHEN 2 TABLET(S): 5; 325 TABLET ORAL at 07:46

## 2017-08-04 RX ADMIN — HEPARIN SODIUM 5000 UNIT(S): 5000 INJECTION INTRAVENOUS; SUBCUTANEOUS at 05:04

## 2017-08-04 RX ADMIN — OXYCODONE AND ACETAMINOPHEN 2 TABLET(S): 5; 325 TABLET ORAL at 14:10

## 2017-08-04 RX ADMIN — Medication 100 MILLIGRAM(S): at 14:03

## 2017-08-04 RX ADMIN — OXYCODONE AND ACETAMINOPHEN 2 TABLET(S): 5; 325 TABLET ORAL at 08:15

## 2017-08-04 RX ADMIN — HEPARIN SODIUM 5000 UNIT(S): 5000 INJECTION INTRAVENOUS; SUBCUTANEOUS at 14:02

## 2017-08-04 NOTE — PROGRESS NOTE ADULT - ATTENDING COMMENTS
seen and examined 8/4/2017 @ 1015    tolerating regular diet  drain output serosanguinous today  LFT's continue to improve slowly  -D/C home with JOHANNA  -f/u in my office next Friday

## 2017-08-04 NOTE — PROGRESS NOTE ADULT - PROVIDER SPECIALTY LIST ADULT
Gastroenterology
Heme/Onc
Internal Medicine
Surgery
Trauma Surgery
Internal Medicine

## 2017-08-04 NOTE — PROGRESS NOTE ADULT - SUBJECTIVE AND OBJECTIVE BOX
STATUS POST:  subtotal lap heaven    POST OPERATIVE DAY #: 3    Vital Signs Last 24 Hrs  T(C): 37.1 (04 Aug 2017 04:49), Max: 37.2 (03 Aug 2017 10:19)  T(F): 98.8 (04 Aug 2017 04:49), Max: 99 (03 Aug 2017 10:19)  HR: 91 (04 Aug 2017 04:49) (85 - 93)  BP: 121/73 (04 Aug 2017 04:49) (100/64 - 123/70)  BP(mean): --  RR: 18 (04 Aug 2017 04:49) (18 - 18)  SpO2: 95% (04 Aug 2017 04:49) (94% - 95%)    I&O's Summary    03 Aug 2017 07:01  -  04 Aug 2017 07:00  --------------------------------------------------------  IN: 250 mL / OUT: 2730 mL / NET: -2480 mL        SUBJECTIVE: Pt seen and examined. Drain output decreasing (30cc/24H). Patient stating to be uncomfortable with drain care at home. Family member provided with education yesterday but only available at night when patient is home. Denies Fever, chills, ABD pain, N/V. Passing flatus but no BM. Tolerating diet and ambulating well.         Physical Exam:  General Appearance: Appears well, NAD  Abdomen: Soft, nondistended, appropriate incisional tenderness, dressings clean and dry and intact, drain in place with serosanguineous output and appearing less bilious today      LABS:                        10.2   11.8  )-----------( 337      ( 04 Aug 2017 06:14 )             31.8     08-04    138  |  99  |  12  ----------------------------<  109<H>  4.2   |  26  |  0.85    Ca    9.9      04 Aug 2017 06:14  Phos  3.2     08-04  Mg     2.2     08-04    TPro  7.5  /  Alb  3.7  /  TBili  3.0<H>  /  DBili  x   /  AST  58<H>  /  ALT  312<H>  /  AlkPhos  155<H>  08-04          Mary Alice Hyatt PA-C  p#[phone]

## 2017-08-04 NOTE — PROGRESS NOTE ADULT - ASSESSMENT
31yo M POD 3 s/p subtotal lap heaven with JOHANNA drain placement    -Trend LFTS  -C/W pain control  -Attempt to instate VNS for drain care at home  -D/C home with JOHANNA and repeat LFTs next week as outpatient  -f/u next week in Dr. Palacios's officeseen and examined 8/3/2017 @ 6841

## 2017-08-04 NOTE — PROGRESS NOTE ADULT - I WAS PHYSICALLY PRESENT FOR THE KEY PORTIONS OF THE EVALUATION AND MANAGEMENT (E/M) SERVICE PROVIDED.  I AGREE WITH THE ABOVE HISTORY, PHYSICAL, AND PLAN WHICH I HAVE REVIEWED AND EDITED WHERE APPROPRIATE

## 2017-08-07 ENCOUNTER — TRANSCRIPTION ENCOUNTER (OUTPATIENT)
Age: 32
End: 2017-08-07

## 2017-08-08 ENCOUNTER — INPATIENT (INPATIENT)
Facility: HOSPITAL | Age: 32
LOS: 5 days | Discharge: HOME | DRG: 862 | End: 2017-08-14
Attending: SURGERY | Admitting: SURGERY
Payer: COMMERCIAL

## 2017-08-08 VITALS
OXYGEN SATURATION: 97 % | WEIGHT: 229.94 LBS | HEART RATE: 117 BPM | SYSTOLIC BLOOD PRESSURE: 117 MMHG | DIASTOLIC BLOOD PRESSURE: 61 MMHG | TEMPERATURE: 100 F | RESPIRATION RATE: 18 BRPM

## 2017-08-08 DIAGNOSIS — Z90.49 ACQUIRED ABSENCE OF OTHER SPECIFIED PARTS OF DIGESTIVE TRACT: Chronic | ICD-10-CM

## 2017-08-08 LAB
ALBUMIN SERPL ELPH-MCNC: 3.8 G/DL — SIGNIFICANT CHANGE UP (ref 3.3–5)
ALP SERPL-CCNC: 115 U/L — SIGNIFICANT CHANGE UP (ref 40–120)
ALT FLD-CCNC: 111 U/L RC — HIGH (ref 10–45)
ANION GAP SERPL CALC-SCNC: 16 MMOL/L — SIGNIFICANT CHANGE UP (ref 5–17)
APTT BLD: 33.5 SEC — SIGNIFICANT CHANGE UP (ref 27.5–37.4)
AST SERPL-CCNC: 30 U/L — SIGNIFICANT CHANGE UP (ref 10–40)
BILIRUB SERPL-MCNC: 2.2 MG/DL — HIGH (ref 0.2–1.2)
BUN SERPL-MCNC: 16 MG/DL — SIGNIFICANT CHANGE UP (ref 7–23)
CALCIUM SERPL-MCNC: 9.7 MG/DL — SIGNIFICANT CHANGE UP (ref 8.4–10.5)
CHLORIDE SERPL-SCNC: 96 MMOL/L — SIGNIFICANT CHANGE UP (ref 96–108)
CO2 SERPL-SCNC: 25 MMOL/L — SIGNIFICANT CHANGE UP (ref 22–31)
CREAT SERPL-MCNC: 1.29 MG/DL — SIGNIFICANT CHANGE UP (ref 0.5–1.3)
GLUCOSE SERPL-MCNC: 123 MG/DL — HIGH (ref 70–99)
HCT VFR BLD CALC: 30.5 % — LOW (ref 39–50)
HGB BLD-MCNC: 10.1 G/DL — LOW (ref 13–17)
INR BLD: 1.4 RATIO — HIGH (ref 0.88–1.16)
LIDOCAIN IGE QN: 34 U/L — SIGNIFICANT CHANGE UP (ref 7–60)
MCHC RBC-ENTMCNC: 29.8 PG — SIGNIFICANT CHANGE UP (ref 27–34)
MCHC RBC-ENTMCNC: 33.1 GM/DL — SIGNIFICANT CHANGE UP (ref 32–36)
MCV RBC AUTO: 90.3 FL — SIGNIFICANT CHANGE UP (ref 80–100)
PLATELET # BLD AUTO: 500 K/UL — HIGH (ref 150–400)
POTASSIUM SERPL-MCNC: 3.8 MMOL/L — SIGNIFICANT CHANGE UP (ref 3.5–5.3)
POTASSIUM SERPL-SCNC: 3.8 MMOL/L — SIGNIFICANT CHANGE UP (ref 3.5–5.3)
PROT SERPL-MCNC: 7.9 G/DL — SIGNIFICANT CHANGE UP (ref 6–8.3)
PROTHROM AB SERPL-ACNC: 15.4 SEC — HIGH (ref 9.8–12.7)
RBC # BLD: 3.37 M/UL — LOW (ref 4.2–5.8)
RBC # FLD: 12.5 % — SIGNIFICANT CHANGE UP (ref 10.3–14.5)
SODIUM SERPL-SCNC: 137 MMOL/L — SIGNIFICANT CHANGE UP (ref 135–145)
WBC # BLD: 15.6 K/UL — HIGH (ref 3.8–10.5)
WBC # FLD AUTO: 15.6 K/UL — HIGH (ref 3.8–10.5)

## 2017-08-08 PROCEDURE — 99285 EMERGENCY DEPT VISIT HI MDM: CPT

## 2017-08-08 RX ORDER — SODIUM CHLORIDE 9 MG/ML
3 INJECTION INTRAMUSCULAR; INTRAVENOUS; SUBCUTANEOUS ONCE
Qty: 0 | Refills: 0 | Status: COMPLETED | OUTPATIENT
Start: 2017-08-08 | End: 2017-08-08

## 2017-08-08 RX ORDER — SODIUM CHLORIDE 9 MG/ML
1000 INJECTION INTRAMUSCULAR; INTRAVENOUS; SUBCUTANEOUS ONCE
Qty: 0 | Refills: 0 | Status: COMPLETED | OUTPATIENT
Start: 2017-08-08 | End: 2017-08-08

## 2017-08-08 RX ADMIN — SODIUM CHLORIDE 3 MILLILITER(S): 9 INJECTION INTRAMUSCULAR; INTRAVENOUS; SUBCUTANEOUS at 23:11

## 2017-08-08 RX ADMIN — SODIUM CHLORIDE 1000 MILLILITER(S): 9 INJECTION INTRAMUSCULAR; INTRAVENOUS; SUBCUTANEOUS at 23:11

## 2017-08-08 NOTE — ED PROVIDER NOTE - OBJECTIVE STATEMENT
31 y/o M with PSHX of cholecystectomy c/o fever (102.3) s/p cholecystectomy (7 days ago). took 600 mg ibuprofen and fever has reduced. dressing was changed today and spiked fever today. 2 stents in liver CBD. Stones in CBD. Denies nausea vomiting, diarrhea,

## 2017-08-08 NOTE — ED PROVIDER NOTE - PROGRESS NOTE DETAILS
Surgery contacted after several pagers. Recommend CT abdomen r/o collection. Will evaluate John: Patient signed out to f/u surgery consult and CT. Surgery team evaluated patient and will admit.

## 2017-08-08 NOTE — ED ADULT NURSE NOTE - OBJECTIVE STATEMENT
31 y/o male presents to ED c/o fever today, tmax 102.3, s/p cholecystectomy. Pt took ibuprofen for pain. Pt states he has chills. Denies n/v/d. A nurse came today to change dressing over drain. Pt lungs clear b/l. Breathing even, unlabored. SKin warm, dry, intact. Laparoscopic incisions noted to abdomen. Drain noted to right abdomen, draining 5ml serosanguinous fluid. No redness, drainage noted to incision site noted. 33 y/o male presents to ED c/o fever today, tmax 102.3, s/p cholecystectomy. Pt took ibuprofen for pain. Pt states he has chills. Denies n/v/d. A nurse came today to change dressing over drain. Pt lungs clear b/l. Breathing even, unlabored. SKin warm, dry, intact. Skin and sclera appear slightly yellowed. Laparoscopic incisions noted to abdomen. Drain noted to right abdomen, draining 5ml serosanguinous fluid. No redness, drainage noted to incision site noted.

## 2017-08-08 NOTE — ED PROVIDER NOTE - MEDICAL DECISION MAKING DETAILS
31yo M w hx of recent choledocholithiasis, 2x ERCP w decompression after stent placement, partial cholecystectomy 10 days ago given significant inflammation/ adherences. Drain in place in the RUQ. Today w fever of 102, no other stx. Took Ibuprofen. Abdomen soft, nontender, sero sanguinolent fluid in the drain. Will get labs, likely CT given recent Sx, will get Sx consult

## 2017-08-09 DIAGNOSIS — R50.9 FEVER, UNSPECIFIED: ICD-10-CM

## 2017-08-09 LAB
APPEARANCE UR: CLEAR — SIGNIFICANT CHANGE UP
BASOPHILS # BLD AUTO: 0.1 K/UL — SIGNIFICANT CHANGE UP (ref 0–0.2)
BILIRUB UR-MCNC: NEGATIVE — SIGNIFICANT CHANGE UP
COLOR SPEC: YELLOW — SIGNIFICANT CHANGE UP
DIFF PNL FLD: NEGATIVE — SIGNIFICANT CHANGE UP
EOSINOPHIL # BLD AUTO: 0.4 K/UL — SIGNIFICANT CHANGE UP (ref 0–0.5)
EOSINOPHIL NFR BLD AUTO: 2 % — SIGNIFICANT CHANGE UP (ref 0–6)
GLUCOSE UR QL: NEGATIVE — SIGNIFICANT CHANGE UP
GRAM STN FLD: SIGNIFICANT CHANGE UP
KETONES UR-MCNC: NEGATIVE — SIGNIFICANT CHANGE UP
LEUKOCYTE ESTERASE UR-ACNC: NEGATIVE — SIGNIFICANT CHANGE UP
LYMPHOCYTES # BLD AUTO: 1.7 K/UL — SIGNIFICANT CHANGE UP (ref 1–3.3)
LYMPHOCYTES # BLD AUTO: 13 % — SIGNIFICANT CHANGE UP (ref 13–44)
METAMYELOCYTES # FLD: 1 % — HIGH (ref 0–0)
MONOCYTES # BLD AUTO: 2.2 K/UL — HIGH (ref 0–0.9)
MONOCYTES NFR BLD AUTO: 15 % — HIGH (ref 2–14)
NEUTROPHILS # BLD AUTO: 11.2 K/UL — HIGH (ref 1.8–7.4)
NEUTROPHILS NFR BLD AUTO: 65 % — SIGNIFICANT CHANGE UP (ref 43–77)
NEUTS BAND # BLD: 3 % — SIGNIFICANT CHANGE UP (ref 0–8)
NITRITE UR-MCNC: NEGATIVE — SIGNIFICANT CHANGE UP
PH UR: 6 — SIGNIFICANT CHANGE UP (ref 5–8)
PLAT MORPH BLD: NORMAL — SIGNIFICANT CHANGE UP
PROT UR-MCNC: NEGATIVE — SIGNIFICANT CHANGE UP
RBC BLD AUTO: SIGNIFICANT CHANGE UP
SP GR SPEC: 1.01 — SIGNIFICANT CHANGE UP (ref 1.01–1.02)
SPECIMEN SOURCE: SIGNIFICANT CHANGE UP
UROBILINOGEN FLD QL: NEGATIVE — SIGNIFICANT CHANGE UP
VARIANT LYMPHS # BLD: 1 % — SIGNIFICANT CHANGE UP (ref 0–6)

## 2017-08-09 PROCEDURE — 71020: CPT | Mod: 26

## 2017-08-09 PROCEDURE — 49406 IMAGE CATH FLUID PERI/RETRO: CPT

## 2017-08-09 PROCEDURE — 99223 1ST HOSP IP/OBS HIGH 75: CPT | Mod: 24,AI

## 2017-08-09 PROCEDURE — 74177 CT ABD & PELVIS W/CONTRAST: CPT | Mod: 26

## 2017-08-09 RX ORDER — ACETAMINOPHEN 500 MG
1000 TABLET ORAL ONCE
Qty: 0 | Refills: 0 | Status: COMPLETED | OUTPATIENT
Start: 2017-08-09 | End: 2017-08-09

## 2017-08-09 RX ORDER — ACETAMINOPHEN 500 MG
650 TABLET ORAL EVERY 6 HOURS
Qty: 0 | Refills: 0 | Status: DISCONTINUED | OUTPATIENT
Start: 2017-08-09 | End: 2017-08-14

## 2017-08-09 RX ORDER — PIPERACILLIN AND TAZOBACTAM 4; .5 G/20ML; G/20ML
3.38 INJECTION, POWDER, LYOPHILIZED, FOR SOLUTION INTRAVENOUS ONCE
Qty: 0 | Refills: 0 | Status: COMPLETED | OUTPATIENT
Start: 2017-08-09 | End: 2017-08-09

## 2017-08-09 RX ORDER — SODIUM CHLORIDE 9 MG/ML
1000 INJECTION, SOLUTION INTRAVENOUS
Qty: 0 | Refills: 0 | Status: DISCONTINUED | OUTPATIENT
Start: 2017-08-09 | End: 2017-08-09

## 2017-08-09 RX ORDER — MORPHINE SULFATE 50 MG/1
4 CAPSULE, EXTENDED RELEASE ORAL EVERY 4 HOURS
Qty: 0 | Refills: 0 | Status: DISCONTINUED | OUTPATIENT
Start: 2017-08-09 | End: 2017-08-09

## 2017-08-09 RX ORDER — POTASSIUM CHLORIDE 20 MEQ
10 PACKET (EA) ORAL ONCE
Qty: 0 | Refills: 0 | Status: COMPLETED | OUTPATIENT
Start: 2017-08-09 | End: 2017-08-09

## 2017-08-09 RX ORDER — IBUPROFEN 200 MG
400 TABLET ORAL EVERY 6 HOURS
Qty: 0 | Refills: 0 | Status: DISCONTINUED | OUTPATIENT
Start: 2017-08-09 | End: 2017-08-14

## 2017-08-09 RX ORDER — MORPHINE SULFATE 50 MG/1
2 CAPSULE, EXTENDED RELEASE ORAL EVERY 4 HOURS
Qty: 0 | Refills: 0 | Status: DISCONTINUED | OUTPATIENT
Start: 2017-08-09 | End: 2017-08-09

## 2017-08-09 RX ORDER — PIPERACILLIN AND TAZOBACTAM 4; .5 G/20ML; G/20ML
3.38 INJECTION, POWDER, LYOPHILIZED, FOR SOLUTION INTRAVENOUS EVERY 8 HOURS
Qty: 0 | Refills: 0 | Status: DISCONTINUED | OUTPATIENT
Start: 2017-08-09 | End: 2017-08-14

## 2017-08-09 RX ORDER — ENOXAPARIN SODIUM 100 MG/ML
40 INJECTION SUBCUTANEOUS DAILY
Qty: 0 | Refills: 0 | Status: DISCONTINUED | OUTPATIENT
Start: 2017-08-09 | End: 2017-08-14

## 2017-08-09 RX ADMIN — SODIUM CHLORIDE 125 MILLILITER(S): 9 INJECTION, SOLUTION INTRAVENOUS at 06:17

## 2017-08-09 RX ADMIN — ENOXAPARIN SODIUM 40 MILLIGRAM(S): 100 INJECTION SUBCUTANEOUS at 11:55

## 2017-08-09 RX ADMIN — PIPERACILLIN AND TAZOBACTAM 200 GRAM(S): 4; .5 INJECTION, POWDER, LYOPHILIZED, FOR SOLUTION INTRAVENOUS at 06:07

## 2017-08-09 RX ADMIN — Medication 400 MILLIGRAM(S): at 14:05

## 2017-08-09 RX ADMIN — Medication 400 MILLIGRAM(S): at 04:09

## 2017-08-09 RX ADMIN — PIPERACILLIN AND TAZOBACTAM 25 GRAM(S): 4; .5 INJECTION, POWDER, LYOPHILIZED, FOR SOLUTION INTRAVENOUS at 13:59

## 2017-08-09 RX ADMIN — PIPERACILLIN AND TAZOBACTAM 25 GRAM(S): 4; .5 INJECTION, POWDER, LYOPHILIZED, FOR SOLUTION INTRAVENOUS at 21:22

## 2017-08-09 RX ADMIN — Medication 100 MILLIEQUIVALENT(S): at 17:43

## 2017-08-09 NOTE — H&P ADULT - NSHPPHYSICALEXAM_GEN_ALL_CORE
General: alert and oriented, NAD  Resp: airway patent, respirations unlabored  CVS: regular rate and rhythm  Abdomen: soft, non-distended with moderate RUQ TTP. Incisions C/D/I. No erythema /drainage. JOHANNA bulb lightly serous/bilious  Extremities: no edema  Skin: warm, dry, appropriate color

## 2017-08-09 NOTE — H&P ADULT - ATTENDING COMMENTS
Pt seen and examined at 4am, agree with above. Briefly, pt is a 31y/o M with recent h/o choledocholithiasis secondary to gallstone disease who underwent 2 ERCPs and then laparoscopic, partial cholecystectomy with JOHANNA drain placement. Pt was discharged on 8/4 and returned on 8/8 complaining of fever to 102F, chills, and RUQ pain (better with ibuprofen) worse today than previously after his surgery on 8/1. On PE, pt with tachycardia (117bpm), fever to 102.9. On exam, pt is in no distress, appears very comfortable and currently denies pain. Normal affect. A&O x3, moving all extremities. Abdomen soft, obese, laparoscopic incisions with steristrips: no erythema or induration, no drainage. JOHANNA drain in RUQ with bilious-tinged fluid (pt notes 25cc yesterday in 24 hours). Mild RUQ tenderness, no rebound or guarding. No scleral icterus.     Labs demonstrate leukocytosis, elevated t bili (although decreased from discharge from 3 to 2.2), elevated creatinine (1.29 from baseline 0.8, c/w FIDELIA stage 1 likely secondary to hypovolemia).    I reviewed pt's CT images personally, which demonstrate a large gallbladder fossa collection with air-fluid level, consistent with abscess, not in continuity with JOHANNA drain. No free air.     A/P: 31y/o man s/p partial cholecystectomy after ERCPs with stent for choledocholithiasis p/w sepsis, FIDELIA stage 1, and likely GB fossa abscess (air in GB fossa may also be related to CBD stents, but in setting of sepsis, favor abscess:  - NPO  - IV antibiotics  - IR to discuss percutaneous drain placement. This was discussed with pt at bedside.  - Pain control. Pt seen and examined at 4am, agree with above. Briefly, pt is a 31y/o M with recent h/o choledocholithiasis secondary to gallstone disease who underwent 2 ERCPs and then laparoscopic, partial cholecystectomy with JOHANNA drain placement. Pt was discharged on 8/4 and returned on 8/8 complaining of fever to 102F, chills, and RUQ pain (better with ibuprofen) worse today than previously after his surgery on 8/1. On PE, pt with tachycardia (117bpm), fever to 102.9. On exam, pt is in no distress, appears very comfortable and currently denies pain. Normal affect. A&O x3, moving all extremities. Abdomen soft, obese, laparoscopic incisions with steristrips: no erythema or induration, no drainage. JOHANNA drain in RUQ with bilious-tinged fluid (pt notes 25cc yesterday in 24 hours). Mild RUQ tenderness, no rebound or guarding. No scleral icterus.     Labs demonstrate leukocytosis, elevated t bili (although decreased from discharge from 3 to 2.2), elevated creatinine (1.29 from baseline 0.8, c/w FIDELIA stage 1 likely secondary to hypovolemia).    I reviewed pt's CT images personally, which demonstrate a large gallbladder fossa collection with air-fluid level, consistent with abscess, not in continuity with JOHANNA drain. No free air.     A/P: 31y/o man s/p partial cholecystectomy after ERCPs with stent for choledocholithiasis p/w sepsis, FIDELIA stage 1, and likely GB fossa abscess (air in GB fossa may also be related to CBD stents, but in setting of sepsis, favor abscess):  - NPO  - IV antibiotics  - IR to discuss percutaneous drain placement. This was discussed with pt at bedside.  - Pain control.

## 2017-08-09 NOTE — H&P ADULT - NSHPLABSRESULTS_GEN_ALL_CORE
10.1   15.6  )-----------( 500      ( 08 Aug 2017 23:07 )             30.5           137  |  96  |  16  ----------------------------<  123<H>  3.8   |  25  |  1.29    Ca    9.7      08 Aug 2017 23:07    TPro  7.9  /  Alb  3.8  /  TBili  2.2<H>  /  DBili  x   /  AST  30  /  ALT  111<H>  /  AlkPhos  115            Urinalysis Basic - ( 09 Aug 2017 04:20 )    Color: Yellow / Appearance: Clear / S.012 / pH: x  Gluc: x / Ketone: Negative  / Bili: Negative / Urobili: Negative   Blood: x / Protein: Negative / Nitrite: Negative   Leuk Esterase: Negative / RBC: x / WBC x   Sq Epi: x / Non Sq Epi: x / Bacteria: x        PT/INR - ( 08 Aug 2017 23:07 )   PT: 15.4 sec;   INR: 1.40 ratio         PTT - ( 08 Aug 2017 23:07 )  PTT:33.5 sec    Imaging:   < from: CT Abdomen and Pelvis w/ Oral Cont and w/ IV Cont (17 @ 02:58) >    Status post cholecystectomy.  Fluid and gas collection in the gallbladder fossa which given reported   fever is suspiciousfor an abscess. Right upper quadrant surgical   drainage catheter whose tip is not located within the gallbladder fossa.    < end of copied text >

## 2017-08-09 NOTE — PROGRESS NOTE ADULT - SUBJECTIVE AND OBJECTIVE BOX
STATUS POST:  Lap cholecystectomy     SUBJECTIVE: Pt seen and examined. Patient stable. No over night events. complains of right upper quadrant pain s/p lap heaven last week.     Pain: [x ] YES [ ] NO  Pain (0-10):    3          Pain Control Adequate: [ ] YES [x ] NO  SOB: [ ]YES [x ] NO  Chest Discomfort: [ ] YES [ x] NO    Nausea: [ ] YES [x ] NO           Vomiting: [ ] YES [x ] NO  Flatus: [ ] YES [x ] NO             Bowel Movement: [x ] YES [ ] NO     Void: [ x]YES [ ]No  Vital Signs Last 24 Hrs  T(C): 37.8 (09 Aug 2017 07:51), Max: 39.4 (09 Aug 2017 03:47)  T(F): 100.1 (09 Aug 2017 07:51), Max: 102.9 (09 Aug 2017 03:47)  HR: 98 (09 Aug 2017 07:51) (98 - 118)  BP: 119/73 (09 Aug 2017 07:51) (114/67 - 138/76)  BP(mean): --  RR: 18 (09 Aug 2017 07:51) (18 - 20)  SpO2: 97% (09 Aug 2017 07:51) (94% - 100%)    I&O's Summary    08 Aug 2017 07:01  -  09 Aug 2017 07:00  --------------------------------------------------------  IN: 0 mL / OUT: 30 mL / NET: -30 mL    09 Aug 2017 07:01  -  09 Aug 2017 10:58  --------------------------------------------------------  IN: 375 mL / OUT: 0 mL / NET: 375 mL    Physical Exam:  General Appearance: Appears well, NAD  Neck: Supple  Chest: Equal expansion bilaterally, equal breath sounds  CV: Pulse regular presently  Abdomen: Soft, nontense, Right upper quadrant abdominal pain   Extremities: Grossly symmetric, SCD's in place     LABS:                      10.1   15.6  )-----------( 500      ( 08 Aug 2017 23:07 )             30.5         137  |  96  |  16  ----------------------------<  123<H>  3.8   |  25  |  1.29    Ca    9.7      08 Aug 2017 23:07    TPro  7.9  /  Alb  3.8  /  TBili  2.2<H>  /  DBili  x   /  AST  30  /  ALT  111<H>  /  AlkPhos  115  08-08    PT/INR - ( 08 Aug 2017 23:07 )   PT: 15.4 sec;   INR: 1.40 ratio         PTT - ( 08 Aug 2017 23:07 )  PTT:33.5 sec  Urinalysis Basic - ( 09 Aug 2017 04:20 )    Color: Yellow / Appearance: Clear / S.012 / pH: x  Gluc: x / Ketone: Negative  / Bili: Negative / Urobili: Negative   Blood: x / Protein: Negative / Nitrite: Negative   Leuk Esterase: Negative / RBC: x / WBC x   Sq Epi: x / Non Sq Epi: x / Bacteria: x    RADIOLOGY & ADDITIONAL STUDIES:

## 2017-08-09 NOTE — CHART NOTE - NSCHARTNOTEFT_GEN_A_CORE
Interventional Radiology  Pre-Procedure Note    This is a 32y  Male      HPI:  33 y/o male with recent admission for choledocholithiasis s/p ERCP x2 & laparoscopic partial cholecystectomy with subhepatic JOHANNA drain in place now p/w increasing RUQ abdominal pain, fevers, & chills. On exam his abdomen is soft, non-distended with moderate RUQ TTP. Incisions C/D/I. No erythema /drainage. JOHANNA bulb lightly serous/bilious. (09 Aug 2017 05:11)      PAST MEDICAL & SURGICAL HISTORY:  Eczema, unspecified type  History of cholecystectomy      Social History:     FAMILY HISTORY:  Family history of gallbladder disease (Grandparent)  Family history of coronary artery disease in mother      Allergies: No Known Allergies      Current Medications: piperacillin/tazobactam IVPB. 3.375 Gram(s) IV Intermittent every 8 hours  lactated ringers. 1000 milliLiter(s) IV Continuous <Continuous>  morphine  - Injectable 2 milliGRAM(s) IV Push every 4 hours PRN  morphine  - Injectable 4 milliGRAM(s) IV Push ever< from: CT Abdomen and Pelvis w/ Oral Cont and w/ IV Cont (08.09.17 @ 02:58)  y 4 hours PRN  enoxaparin Injectable 40 milliGRAM(s) SubCutaneous daily  potassium chloride  10 mEq/100 mL IVPB 10 milliEquivalent(s) IV Intermittent once >    EXAM:  CT ABDOMEN AND PELVIS OC IC                            PROCEDURE DATE:  08/09/2017            INTERPRETATION:    CLINICAL INFORMATION: Fever and abdominal pain. Status post partial   cholecystectomy one week ago. Rule out abscess.    COMPARISON: MRI abdomen from 7/26/2017    PROCEDURE:   CT of the Abdomen and Pelvis was performed with intravenous contrast.   Intravenous contrast: 90 ml Omnipaque 350. 10 ml discarded.  Oral contrast: positive contrast was administered.  Sagittal and coronal reformats were performed.    FINDINGS:    LOWER CHEST: Within normal limits.    LIVER: Within normal limits.  BILE DUCTS: Normal caliber.  GALLBLADDER: Status post cholecystectomy. Fluid collection containing   multiple foci of air with an air-fluid level in the gallbladder fossa   measuring 9 x 5.2 cm in greatest transaxial dimension and 6 cm in   craniocaudad dimension. Right upper quadrant surgical drain whose tip is   located deep to the left hepatic lobe at the level of the falciform   ligament.  SPLEEN: Within normal limits.  PANCREAS: Within normal limits.  ADRENALS: Within normal limits.  KIDNEYS/URETERS: Within normal limits.    URINARY BLADDER: Within normal limits.  REPRODUCTIVE ORGANS: The prostate is within normal limits.    BOWEL/MESENTERY: No bowel obstruction. Normal appendix.  PERITONEUM/RETROPERITONEUM: Right upper quadrant drainage catheter and   fluid collection in the gallbladder fossa as described above.    VESSELS:  Within normal limits.  LYMPH NODES: No abdominalor pelvic lymphadenopathy.  SOFT TISSUES: Small fat-containing umbilical hernia.  BONES: Within normal limits.    IMPRESSION:   Status post cholecystectomy.  Fluid and gas collection in the gallbladder fossa which given reported   fever is suspiciousfor an abscess. Right upper quadrant surgical   drainage catheter whose tip is not located within the gallbladder fossa.                JUWAN TAVAREZ M.D., RADIOLOGY RESIDENT  This document has been electronically signed.  PEG PEREZ M.D., ATTENDINGRADIOLOGIST  This document has been electronically signed. Aug  9 2017  4:06AM        < end of copied text >        Labs:                         10.1   15.6  )-----------( 500      ( 08 Aug 2017 23:07 )             30.5       08-08    137  |  96  |  16  ----------------------------<  123<H>  3.8   |  25  |  1.29    Ca    9.7      08 Aug 2017 23:07    TPro  7.9  /  Alb  3.8  /  TBili  2.2<H>  /  DBili  x   /  AST  30  /  ALT  111<H>  /  AlkPhos  115  08-08          Assessment/Plan:   This is a 32y  Male  presents with gallbladder abscess post cholecystostomy  Plan is for drainage  Procedure/ risks/ benefits/ goals/ alternatives were explained. All questions answered. Informed content obtained from patient. Consent placed in chart.

## 2017-08-09 NOTE — CHART NOTE - NSCHARTNOTEFT_GEN_A_CORE
Interventional Radiology Post Procedure Note:    Pre procedure diagnosis: Cholecystitis s/p cholecystectomy  Post procedure diagnosis: same  Indications for procedure: Gallbladder fossa abscess  Procedure performed: CT guided drainage  (s): Aníbal Grant  Assistant(s):  Anesthesia type: 2% lidocaine  Sedation type: per anesthesiology  Access: right upper abd  Closure: 0-silk  Implants: 8.5 Fr APD  Aspirate: 135cc pus  Specimen: pus to micro  Estimated Blood Loss: min  Contrast administered: none  Complications: none  Disposition: PACU  Condition: stable  Findings: air and fluid filled collection decompressed post procedure  Plan: bulb drainage    Please call Interventional Radiology with any questions, concerns, or issues.

## 2017-08-09 NOTE — H&P ADULT - ASSESSMENT
31y/o male s/p lap partial cholecystectomy; now with a subhepatic abscess    - NPO with IVF  - Broad spectrum Abx  - jazmin for IR drainage today  - Pt seen & examined with Dr. Silvestre

## 2017-08-10 LAB
ALBUMIN SERPL ELPH-MCNC: 3.2 G/DL — LOW (ref 3.3–5)
ALP SERPL-CCNC: 103 U/L — SIGNIFICANT CHANGE UP (ref 40–120)
ALT FLD-CCNC: 84 U/L — HIGH (ref 10–45)
ANION GAP SERPL CALC-SCNC: 15 MMOL/L — SIGNIFICANT CHANGE UP (ref 5–17)
AST SERPL-CCNC: 38 U/L — SIGNIFICANT CHANGE UP (ref 10–40)
BILIRUB SERPL-MCNC: 1.9 MG/DL — HIGH (ref 0.2–1.2)
BUN SERPL-MCNC: 11 MG/DL — SIGNIFICANT CHANGE UP (ref 7–23)
CALCIUM SERPL-MCNC: 9.2 MG/DL — SIGNIFICANT CHANGE UP (ref 8.4–10.5)
CHLORIDE SERPL-SCNC: 99 MMOL/L — SIGNIFICANT CHANGE UP (ref 96–108)
CO2 SERPL-SCNC: 25 MMOL/L — SIGNIFICANT CHANGE UP (ref 22–31)
CREAT SERPL-MCNC: 0.85 MG/DL — SIGNIFICANT CHANGE UP (ref 0.5–1.3)
GLUCOSE SERPL-MCNC: 103 MG/DL — HIGH (ref 70–99)
HCT VFR BLD CALC: 27 % — LOW (ref 39–50)
HGB BLD-MCNC: 8.8 G/DL — LOW (ref 13–17)
MAGNESIUM SERPL-MCNC: 2.5 MG/DL — SIGNIFICANT CHANGE UP (ref 1.6–2.6)
MCHC RBC-ENTMCNC: 27.6 PG — SIGNIFICANT CHANGE UP (ref 27–34)
MCHC RBC-ENTMCNC: 32.6 GM/DL — SIGNIFICANT CHANGE UP (ref 32–36)
MCV RBC AUTO: 84.6 FL — SIGNIFICANT CHANGE UP (ref 80–100)
PHOSPHATE SERPL-MCNC: 3.8 MG/DL — SIGNIFICANT CHANGE UP (ref 2.5–4.5)
PLATELET # BLD AUTO: 510 K/UL — HIGH (ref 150–400)
POTASSIUM SERPL-MCNC: 4.3 MMOL/L — SIGNIFICANT CHANGE UP (ref 3.5–5.3)
POTASSIUM SERPL-SCNC: 4.3 MMOL/L — SIGNIFICANT CHANGE UP (ref 3.5–5.3)
PROT SERPL-MCNC: 7.5 G/DL — SIGNIFICANT CHANGE UP (ref 6–8.3)
RBC # BLD: 3.19 M/UL — LOW (ref 4.2–5.8)
RBC # FLD: 13.9 % — SIGNIFICANT CHANGE UP (ref 10.3–14.5)
SODIUM SERPL-SCNC: 139 MMOL/L — SIGNIFICANT CHANGE UP (ref 135–145)
WBC # BLD: 8.8 K/UL — SIGNIFICANT CHANGE UP (ref 3.8–10.5)
WBC # FLD AUTO: 8.8 K/UL — SIGNIFICANT CHANGE UP (ref 3.8–10.5)

## 2017-08-10 PROCEDURE — 99223 1ST HOSP IP/OBS HIGH 75: CPT | Mod: GC

## 2017-08-10 RX ADMIN — PIPERACILLIN AND TAZOBACTAM 25 GRAM(S): 4; .5 INJECTION, POWDER, LYOPHILIZED, FOR SOLUTION INTRAVENOUS at 22:06

## 2017-08-10 RX ADMIN — PIPERACILLIN AND TAZOBACTAM 25 GRAM(S): 4; .5 INJECTION, POWDER, LYOPHILIZED, FOR SOLUTION INTRAVENOUS at 05:27

## 2017-08-10 RX ADMIN — Medication 650 MILLIGRAM(S): at 05:27

## 2017-08-10 RX ADMIN — ENOXAPARIN SODIUM 40 MILLIGRAM(S): 100 INJECTION SUBCUTANEOUS at 11:44

## 2017-08-10 RX ADMIN — PIPERACILLIN AND TAZOBACTAM 25 GRAM(S): 4; .5 INJECTION, POWDER, LYOPHILIZED, FOR SOLUTION INTRAVENOUS at 13:57

## 2017-08-10 RX ADMIN — Medication 650 MILLIGRAM(S): at 05:57

## 2017-08-10 NOTE — CONSULT NOTE ADULT - ATTENDING COMMENTS
agree with current approach.  Will monitor culture results.  Agree with Zosyn (initiated on 8/9) 7 day course anticipated

## 2017-08-10 NOTE — CONSULT NOTE ADULT - SUBJECTIVE AND OBJECTIVE BOX
Patient is a 32y old  Male who presents with a chief complaint of Abdominal pain (09 Aug 2017 05:11)    HPI/INTERVAL EVENTS:  32M recent diagnosis of choledocholithiasis s/p ERCP x 2 w/ partial stone & then large stone removal, underwent elective lap subtotal cholecystectomy on 8/1 (subtotal 2/2 scar tissue in gallbladder), then d/c'ed on 8/4 w/ JOHANNA drain in place.  Pt was feeling well until 8/8 when he developed high fever of 102.  Pt reports transient RUQ pain associated w/ laughing, coughing, movement, but denied significant or persistent abd pain.  Denies N, V, changes in BM.  Says the output from the initial JOHANNA drain was about 20-30 cc of orange fluid daily.  On admission, pt showing signs of sepsis (Tmax 102.9, -110s) but remained hemodynamically stable.  CT A/P on admission showed large collection in gallbladder fossa for which he underwent CT guided by IR drainage on 8/9.  135cc of purulent fluid was drainage & another JOHANNA drain left in place.  Pt reports feeling better after drainage & denies any abd pain.     PAST MEDICAL & SURGICAL HISTORY:  Eczema, unspecified type  History of cholecystectomy      Social history:    FAMILY HISTORY:  Family history of gallbladder disease (Grandparent)  Family history of coronary artery disease in mother    REVIEW OF SYSTEMS  General: +Fevers  Skin: No rash		  Respiratory and Thorax: No cough or SOB.   Cardiovascular:	No chest pain, palpitaions or dizziness  Gastrointestinal:	+Intermittent abd pain   Genitourinary:	No dysuria, frequency. No flank pain  Musculoskeletal: No joint pain.  Neurological: No confusion or weakness.           Allergies    No Known Allergies    Intolerances        Antimicrobials:    piperacillin/tazobactam IVPB. 3.375 Gram(s) IV Intermittent every 8 hours        Vital Signs Last 24 Hrs  T(C): 36.9 (10 Aug 2017 14:44), Max: 37.3 (09 Aug 2017 17:39)  T(F): 98.4 (10 Aug 2017 14:44), Max: 99.1 (09 Aug 2017 17:39)  HR: 98 (10 Aug 2017 14:44) (82 - 105)  BP: 123/74 (10 Aug 2017 14:44) (96/51 - 128/66)  BP(mean): --  RR: 18 (10 Aug 2017 14:44) (18 - 18)  SpO2: 98% (10 Aug 2017 14:44) (94% - 98%)    PHYSICAL EXAM:  Constitutional:  Well appearing, NAD, sitting up in chair  Eyes: PERRL EOMI. NO discharge or conjunctival injection  ENMT: MMM, no oral lesions  Neck:  Supple  Respiratory: CTAB  Cardiovascular: RRR  Gastrointestinal: RUQ JOHANNA drain in place, w/ serosanguinous fluid, RLQ JOHANNA drain in place w/ orange fluid, both sites C/D/I  Extremities: No LE edema  Neurological: Non-focal   Skin: No rash   Lymph Nodes: No palpable neck LN.  Psychiatric: Affect normal.        LABS                        8.8    8.80  )-----------( 510      ( 10 Aug 2017 07:39 )             27.0         08-10    139  |  99  |  11  ----------------------------<  103<H>  4.3   |  25  |  0.85    Ca    9.2      10 Aug 2017 08:05  Phos  3.8     08-10  Mg     2.5     08-10    TPro  7.5  /  Alb  3.2<L>  /  TBili  1.9<H>  /  DBili  x   /  AST  38  /  ALT  84<H>  /  AlkPhos  103  08-10      RECENT CULTURES:      MICROBIOLOGY:          Radiology:      Assessment:        Recommendations and Plan: Patient is a 32y old  Male who presents with a chief complaint of Abdominal pain (09 Aug 2017 05:11)    HPI/INTERVAL EVENTS:  32M recent diagnosis of choledocholithiasis s/p ERCP x 2 w/ partial stone & then large stone removal, underwent elective lap subtotal cholecystectomy on 8/1 (subtotal 2/2 scar tissue in gallbladder), then d/c'ed on 8/4 w/ JOHANNA drain in place.  Pt was feeling well until 8/8 when he developed high fever of 102.  Pt reports transient RUQ pain associated w/ laughing, coughing, movement, but denied significant or persistent abd pain.  Denies N, V, changes in BM.  Says the output from the initial JOHANNA drain was about 20-30 cc of orange fluid daily.  On admission, pt showing signs of sepsis (Tmax 102.9, -110s) but remained hemodynamically stable.  CT A/P on admission showed large collection in gallbladder fossa for which he underwent CT guided by IR drainage on 8/9.  135cc of purulent fluid was drainage & another JOHANNA drain left in place.  Pt reports feeling better after drainage & denies any abd pain.     PAST MEDICAL & SURGICAL HISTORY:  Eczema, unspecified type  History of cholecystectomy      Social history:    FAMILY HISTORY:  Family history of gallbladder disease (Grandparent)  Family history of coronary artery disease in mother    REVIEW OF SYSTEMS  General: +Fevers  Skin: No rash		  Respiratory and Thorax: No cough or SOB.   Cardiovascular:	No chest pain, palpitaions or dizziness  Gastrointestinal:	+Intermittent abd pain   Genitourinary:	No dysuria, frequency. No flank pain  Musculoskeletal: No joint pain.  Neurological: No confusion or weakness.           Allergies    No Known Allergies    Intolerances        Antimicrobials:    piperacillin/tazobactam IVPB. 3.375 Gram(s) IV Intermittent every 8 hours        Vital Signs Last 24 Hrs  T(C): 36.9 (10 Aug 2017 14:44), Max: 37.3 (09 Aug 2017 17:39)  T(F): 98.4 (10 Aug 2017 14:44), Max: 99.1 (09 Aug 2017 17:39)  HR: 98 (10 Aug 2017 14:44) (82 - 105)  BP: 123/74 (10 Aug 2017 14:44) (96/51 - 128/66)  BP(mean): --  RR: 18 (10 Aug 2017 14:44) (18 - 18)  SpO2: 98% (10 Aug 2017 14:44) (94% - 98%)    PHYSICAL EXAM:  Constitutional:  Well appearing, NAD, sitting up in chair  Eyes: PERRL EOMI. NO discharge or conjunctival injection  ENMT: MMM, no oral lesions  Neck:  Supple  Respiratory: CTAB  Cardiovascular: RRR  Gastrointestinal: RUQ JOHANNA drain in place, w/ serosanguinous fluid, RLQ JOHANNA drain in place w/ orange fluid, both sites C/D/I  Extremities: No LE edema  Neurological: Non-focal   Skin: No rash   Lymph Nodes: No palpable neck LN.  Psychiatric: Affect normal.        LABS                        8.8    8.80  )-----------( 510      ( 10 Aug 2017 07:39 )             27.0         08-10    139  |  99  |  11  ----------------------------<  103<H>  4.3   |  25  |  0.85    Ca    9.2      10 Aug 2017 08:05  Phos  3.8     08-10  Mg     2.5     08-10    TPro  7.5  /  Alb  3.2<L>  /  TBili  1.9<H>  /  DBili  x   /  AST  38  /  ALT  84<H>  /  AlkPhos  103  08-10      RECENT CULTURES:      MICROBIOLOGY: Culture - Body Fluid with Gram Stain (08.09.17 @ 21:48)    Gram Stain:   Numerous polymorphonuclear leukocytes per low power field  Numerous Gram Positive Cocci in Pairs and Chains per oil power field  Moderate Gram Positive Rods per oil power field  Moderate Gram Negative Rods per oil power field    Specimen Source: .Body Fluid      Radiology:     EXAM:  CT ABDOMEN AND PELVIS OC IC                            PROCEDURE DATE:  08/09/2017            INTERPRETATION:    CLINICAL INFORMATION: Fever and abdominal pain. Status post partial   cholecystectomy one week ago. Rule out abscess.    COMPARISON: MRI abdomen from 7/26/2017    PROCEDURE:   CT of the Abdomen and Pelvis was performed with intravenous contrast.   Intravenous contrast: 90 ml Omnipaque 350. 10 ml discarded.  Oral contrast: positive contrast was administered.  Sagittal and coronal reformats were performed.    FINDINGS:    LOWER CHEST: Within normal limits.    LIVER: Within normal limits.  BILE DUCTS: Normal caliber.  GALLBLADDER: Status post cholecystectomy. Fluid collection containing   multiple foci of air with an air-fluid level in the gallbladder fossa   measuring 9 x 5.2 cm in greatest transaxial dimension and 6 cm in   craniocaudad dimension. Right upper quadrant surgical drain whose tip is   located deep to the left hepatic lobe at the level of the falciform   ligament.  SPLEEN: Within normal limits.  PANCREAS: Within normal limits.  ADRENALS: Within normal limits.  KIDNEYS/URETERS: Within normal limits.    URINARY BLADDER: Within normal limits.  REPRODUCTIVE ORGANS: The prostate is within normal limits.    BOWEL/MESENTERY: No bowel obstruction. Normal appendix.  PERITONEUM/RETROPERITONEUM: Right upper quadrant drainage catheter and   fluid collection in the gallbladder fossa as described above.    VESSELS:  Within normal limits.  LYMPH NODES: No abdominalor pelvic lymphadenopathy.  SOFT TISSUES: Small fat-containing umbilical hernia.  BONES: Within normal limits.    IMPRESSION:   Status post cholecystectomy.  Fluid and gas collection in the gallbladder fossa which given reported   fever is suspiciousfor an abscess. Right upper quadrant surgical   drainage catheter whose tip is not located within the gallbladder fossa.        JUWAN TAVAREZ M.D., RADIOLOGY RESIDENT  This document has been electronically signed.  PEG PEREZ M.D., ATTENDINGRADIOLOGIST  This document has been electronically signed. Aug  9 2017  4:06AM        < end of copied text > Patient is a 32y old  Male who presents with a chief complaint of Abdominal pain (09 Aug 2017 05:11)    HPI/INTERVAL EVENTS:  32M recent diagnosis of choledocholithiasis s/p ERCP x 2 w/ partial stone & then large stone removal, underwent elective lap subtotal cholecystectomy on 8/1 (subtotal 2/2 scar tissue in gallbladder), then d/c'ed on 8/4 w/ JOHANNA drain in place.  Pt was feeling well until 8/8 when he developed high fever of 102.  Pt reports transient RUQ pain associated w/ laughing, coughing, movement, but denied significant or persistent abd pain.  Denies N, V, changes in BM.  Says the output from the initial JOHANNA drain was about 20-30 cc of orange fluid daily.  On admission, pt showing signs of sepsis (Tmax 102.9, -110s) but remained hemodynamically stable.  CT A/P on admission showed large collection in gallbladder fossa for which he underwent CT guided by IR drainage on 8/9.  135cc of purulent fluid was drainage & another JOHANNA drain left in place.  Pt reports feeling better after drainage & denies any abd pain.     PAST MEDICAL & SURGICAL HISTORY:  Eczema, unspecified type  History of lap subtotal cholecystectomy 8/1/17      Social history:    FAMILY HISTORY:  Family history of gallbladder disease (Grandparent)  Family history of coronary artery disease in mother    REVIEW OF SYSTEMS  General: +Fevers  Skin: No rash		  Respiratory and Thorax: No cough or SOB.   Cardiovascular:	No chest pain, palpitaions or dizziness  Gastrointestinal:	+Intermittent abd pain   Genitourinary:	No dysuria, frequency. No flank pain  Musculoskeletal: No joint pain.  Neurological: No confusion or weakness.           Allergies    No Known Allergies    Intolerances        Antimicrobials:    piperacillin/tazobactam IVPB. 3.375 Gram(s) IV Intermittent every 8 hours        Vital Signs Last 24 Hrs  T(C): 36.9 (10 Aug 2017 14:44), Max: 37.3 (09 Aug 2017 17:39)  T(F): 98.4 (10 Aug 2017 14:44), Max: 99.1 (09 Aug 2017 17:39)  HR: 98 (10 Aug 2017 14:44) (82 - 105)  BP: 123/74 (10 Aug 2017 14:44) (96/51 - 128/66)  BP(mean): --  RR: 18 (10 Aug 2017 14:44) (18 - 18)  SpO2: 98% (10 Aug 2017 14:44) (94% - 98%)    PHYSICAL EXAM:  Constitutional:  Well appearing, NAD, sitting up in chair  Eyes: PERRL EOMI. NO discharge or conjunctival injection  ENMT: MMM, no oral lesions  Neck:  Supple  Respiratory: CTAB  Cardiovascular: RRR  Gastrointestinal: RUQ JOHANNA drain in place, w/ serosanguinous fluid, RLQ JOHANNA drain in place w/ orange fluid, both sites C/D/I  Extremities: No LE edema  Neurological: Non-focal   Skin: No rash   Lymph Nodes: No palpable neck LN.  Psychiatric: Affect normal.        LABS                        8.8    8.80  )-----------( 510      ( 10 Aug 2017 07:39 )             27.0         08-10    139  |  99  |  11  ----------------------------<  103<H>  4.3   |  25  |  0.85    Ca    9.2      10 Aug 2017 08:05  Phos  3.8     08-10  Mg     2.5     08-10    TPro  7.5  /  Alb  3.2<L>  /  TBili  1.9<H>  /  DBili  x   /  AST  38  /  ALT  84<H>  /  AlkPhos  103  08-10      RECENT CULTURES:      MICROBIOLOGY: Culture - Body Fluid with Gram Stain (08.09.17 @ 21:48)    Gram Stain:   Numerous polymorphonuclear leukocytes per low power field  Numerous Gram Positive Cocci in Pairs and Chains per oil power field  Moderate Gram Positive Rods per oil power field  Moderate Gram Negative Rods per oil power field    Specimen Source: .Body Fluid      Radiology:     EXAM:  CT ABDOMEN AND PELVIS OC IC                            PROCEDURE DATE:  08/09/2017            INTERPRETATION:    CLINICAL INFORMATION: Fever and abdominal pain. Status post partial   cholecystectomy one week ago. Rule out abscess.    COMPARISON: MRI abdomen from 7/26/2017    PROCEDURE:   CT of the Abdomen and Pelvis was performed with intravenous contrast.   Intravenous contrast: 90 ml Omnipaque 350. 10 ml discarded.  Oral contrast: positive contrast was administered.  Sagittal and coronal reformats were performed.    FINDINGS:    LOWER CHEST: Within normal limits.    LIVER: Within normal limits.  BILE DUCTS: Normal caliber.  GALLBLADDER: Status post cholecystectomy. Fluid collection containing   multiple foci of air with an air-fluid level in the gallbladder fossa   measuring 9 x 5.2 cm in greatest transaxial dimension and 6 cm in   craniocaudad dimension. Right upper quadrant surgical drain whose tip is   located deep to the left hepatic lobe at the level of the falciform   ligament.  SPLEEN: Within normal limits.  PANCREAS: Within normal limits.  ADRENALS: Within normal limits.  KIDNEYS/URETERS: Within normal limits.    URINARY BLADDER: Within normal limits.  REPRODUCTIVE ORGANS: The prostate is within normal limits.    BOWEL/MESENTERY: No bowel obstruction. Normal appendix.  PERITONEUM/RETROPERITONEUM: Right upper quadrant drainage catheter and   fluid collection in the gallbladder fossa as described above.    VESSELS:  Within normal limits.  LYMPH NODES: No abdominalor pelvic lymphadenopathy.  SOFT TISSUES: Small fat-containing umbilical hernia.  BONES: Within normal limits.    IMPRESSION:   Status post cholecystectomy.  Fluid and gas collection in the gallbladder fossa which given reported   fever is suspiciousfor an abscess. Right upper quadrant surgical   drainage catheter whose tip is not located within the gallbladder fossa.        JUWAN TAVAREZ M.D., RADIOLOGY RESIDENT  This document has been electronically signed.  PEG PEREZ M.D., ATTENDINGRADIOLOGIST  This document has been electronically signed. Aug  9 2017  4:06AM        < end of copied text >

## 2017-08-10 NOTE — PROGRESS NOTE ADULT - SUBJECTIVE AND OBJECTIVE BOX
Interventional Radiology Follow- Up Note      This is a 32y Male who was recently admitted for choledocholithiasis s/p ERCP x2 & laparoscopic partial cholecystectomy with subhepatic JOHANNA drain placement. Pt was admitted on 8/9 for increasing RUQ abdominal pain, fevers, & chills. Pt was found to have fluid collection on Ct and is currently s/p gallbladder fossa abscess drainage on 8/9 in Interventional Radiology with Dr. Grant. 135cc of pus was aspirated at time of procedure and currently has had 60cc of output since. WBC trending down (15.3--> 8.8). Pt on Zosyn q8hr.         PAST MEDICAL & SURGICAL HISTORY:  Eczema, unspecified type  History of cholecystectomy      Allergies: No Known Allergies      LABS:                        8.8    8.80  )-----------( 510      ( 10 Aug 2017 07:39 )             27.0     08-10    139  |  99  |  11  ----------------------------<  103<H>  4.3   |  25  |  0.85    Ca    9.2      10 Aug 2017 08:05  Phos  3.8     08-10  Mg     2.5     08-10    TPro  7.5  /  Alb  3.2<L>  /  TBili  1.9<H>  /  DBili  x   /  AST  38  /  ALT  84<H>  /  AlkPhos  103  08-10    PT/INR - ( 08 Aug 2017 23:07 )   PT: 15.4 sec;   INR: 1.40 ratio         PTT - ( 08 Aug 2017 23:07 )  PTT:33.5 sec  I&O's Detail    09 Aug 2017 07:01  -  10 Aug 2017 07:00  --------------------------------------------------------  IN:    IV PiggyBack: 500 mL    lactated ringers.: 1000 mL    Oral Fluid: 1000 mL  Total IN: 2500 mL    OUT:    Bulb: 60 mL    Bulb: 70 mL    Voided: 1600 mL  Total OUT: 1730 mL    Total NET: 770 mL        Abscess culture: grew positive for gram + and - rods, numerous gram+ paired cocci and polymorphonuclear leukocytes. Sensitivity still pending.      Vitals: T(F): 98.2 (08-10-17 @ 07:55), Max: 102.7 (08-09-17 @ 12:10)  HR: 82 (08-10-17 @ 07:55) (82 - 118)  BP: 128/66 (08-10-17 @ 07:55) (96/51 - 140/74)  RR: 18 (08-10-17 @ 07:55) (18 - 18)  SpO2: 96% (08-10-17 @ 07:55) (94% - 98%)    PHYSICAL EXAM:  Gen:   Abd:      A/P: 32y Male s/p IR drainage of gallbladder fossa abscess on 8/9 by Dr. Grant.  -continue to monitor output    -flush drain per doctor orders  -trend vitals, labs  -global care per primary team  -will discuss with IR attending     Please call IR at extension [phone] with any questions, concerns, or issues regarding above. Interventional Radiology Follow- Up Note      This is a 32y Male who was recently admitted for choledocholithiasis s/p ERCP x2 & laparoscopic partial cholecystectomy with subhepatic JOHANNA drain placement. Pt was admitted on 8/9 for increasing RUQ abdominal pain, fevers, & chills. Pt was found to have fluid collection on Ct and is currently s/p gallbladder fossa abscess drainage on 8/9 in Interventional Radiology with Dr. Grant. 135cc of pus was aspirated at time of procedure and currently has had 60cc of output since. WBC trending down (15.3--> 8.8). Pt on Zosyn q8hr.     Pt seen and examined at bedside. Offers not complaints at this time.       PAST MEDICAL & SURGICAL HISTORY:  Eczema, unspecified type  History of cholecystectomy      Allergies: No Known Allergies      LABS:                        8.8    8.80  )-----------( 510      ( 10 Aug 2017 07:39 )             27.0     08-10    139  |  99  |  11  ----------------------------<  103<H>  4.3   |  25  |  0.85    Ca    9.2      10 Aug 2017 08:05  Phos  3.8     08-10  Mg     2.5     08-10    TPro  7.5  /  Alb  3.2<L>  /  TBili  1.9<H>  /  DBili  x   /  AST  38  /  ALT  84<H>  /  AlkPhos  103  08-10    PT/INR - ( 08 Aug 2017 23:07 )   PT: 15.4 sec;   INR: 1.40 ratio         PTT - ( 08 Aug 2017 23:07 )  PTT:33.5 sec  I&O's Detail    09 Aug 2017 07:01  -  10 Aug 2017 07:00  --------------------------------------------------------  IN:    IV PiggyBack: 500 mL    lactated ringers.: 1000 mL    Oral Fluid: 1000 mL  Total IN: 2500 mL    OUT:    Bulb: 60 mL    Bulb: 70 mL    Voided: 1600 mL  Total OUT: 1730 mL    Total NET: 770 mL        Abscess culture: grew positive for gram + and - rods, numerous gram+ paired cocci and polymorphonuclear leukocytes. Sensitivity still pending.      Vitals: T(F): 98.2 (08-10-17 @ 07:55), Max: 102.7 (08-09-17 @ 12:10)  HR: 82 (08-10-17 @ 07:55) (82 - 118)  BP: 128/66 (08-10-17 @ 07:55) (96/51 - 140/74)  RR: 18 (08-10-17 @ 07:55) (18 - 18)  SpO2: 96% (08-10-17 @ 07:55) (94% - 98%)    PHYSICAL EXAM:  Gen: NAD, A&Ox3  Abd: ND, soft, NTTP. abscess drain intact to JOHANNA with serosanguinous output, dressing clean and dry. Subhepatic drain noted and intact.       A/P: 32y Male s/p IR drainage of gallbladder fossa abscess on 8/9 by Dr. Grant.  -continue to monitor output    -flush drain per doctor orders  -trend vitals, labs  -global care per primary team  -will discuss pt status with IR attending     Please call IR at extension [phone] with any questions, concerns, or issues regarding above.

## 2017-08-10 NOTE — PROVIDER CONTACT NOTE (CRITICAL VALUE NOTIFICATION) - TEST AND RESULT REPORTED:
Body fluid culture collected 8/9 abdomen Gram stain result is moderate gram + rods and gram - rods. Numerous gram +cocci in rods and pairs, numerous polymar phonuclear luekocytes.

## 2017-08-10 NOTE — PROGRESS NOTE ADULT - SUBJECTIVE AND OBJECTIVE BOX
ACS Progress Note    S: Patient seen and examined. No acute events overnight. Pain well controlled with current regimen. Denies nausea/vomiting. Cultures results from 8/9 show numerous gram(+) cocci in pairs and chains, gram(+) rods and gram(-) rods.       O:  Vital Signs Last 24 Hrs  T(C): 36.8 (10 Aug 2017 07:55), Max: 37.3 (09 Aug 2017 17:39)  T(F): 98.2 (10 Aug 2017 07:55), Max: 99.1 (09 Aug 2017 17:39)  HR: 82 (10 Aug 2017 07:55) (82 - 105)  BP: 128/66 (10 Aug 2017 07:55) (96/51 - 128/66)  BP(mean): --  RR: 18 (10 Aug 2017 07:55) (18 - 18)  SpO2: 96% (10 Aug 2017 07:55) (94% - 98%)    I&O's Detail    09 Aug 2017 07:01  -  10 Aug 2017 07:00  --------------------------------------------------------  IN:    IV PiggyBack: 500 mL    lactated ringers.: 1000 mL    Oral Fluid: 1000 mL  Total IN: 2500 mL    OUT:    Bulb: 60 mL    Bulb: 70 mL    Voided: 1600 mL  Total OUT: 1730 mL    Total NET: 770 mL      10 Aug 2017 07:01  -  10 Aug 2017 13:38  --------------------------------------------------------  IN:  Total IN: 0 mL    OUT:    Voided: 200 mL  Total OUT: 200 mL    Total NET: -200 mL          MEDICATIONS  (STANDING):  piperacillin/tazobactam IVPB. 3.375 Gram(s) IV Intermittent every 8 hours  enoxaparin Injectable 40 milliGRAM(s) SubCutaneous daily    MEDICATIONS  (PRN):  acetaminophen   Tablet. 650 milliGRAM(s) Oral every 6 hours PRN Mild to Moderate pain  ibuprofen  Tablet 400 milliGRAM(s) Oral every 6 hours PRN moderate pain                            8.8    8.80  )-----------( 510      ( 10 Aug 2017 07:39 )             27.0       08-10    139  |  99  |  11  ----------------------------<  103<H>  4.3   |  25  |  0.85    Ca    9.2      10 Aug 2017 08:05  Phos  3.8     08-10  Mg     2.5     08-10    TPro  7.5  /  Alb  3.2<L>  /  TBili  1.9<H>  /  DBili  x   /  AST  38  /  ALT  84<H>  /  AlkPhos  103  08-10      Physical Exam:  Gen: Laying in bed, NAD, alert and oriented.   Resp: Unlabored breathing  Abd: soft, NTND. Lap incision sites c/d/i with steris. JOHANNA drain in R abd (inferior drain) with serous output. IR drain in R abd (superior drain) with serosanguinous output and bilious fluid.

## 2017-08-10 NOTE — CONSULT NOTE ADULT - ASSESSMENT
Assessment:   32M hx choledocholithiasis s/p lap subtotal cholecystectomy on 8/1, p/w high fever, tachycardia, leukocytosis, found to have large gallbladder fossa abscess now s/p CT guided drainage w/ JOHANNA drain in place & culture of fluid showing evidence of polymicrobial infection.  Currently clinically improved s/p drainage.     Recommendations and Plan:  -C/w Zosyn   -Follow up blood cultures  -Await culture speciation & sensitivities   -Since source control is obtained, would favor short course of antibiotics (5-7 days) unless clinical status changes    Attending addendum to follow Assessment:   32M hx choledocholithiasis s/p lap subtotal cholecystectomy on 8/1, p/w high fever, admitted w/ sepsis 2/2 large gallbladder fossa abscess now s/p CT guided drainage on 8/9 w/ JOHANNA drain in place & culture of fluid showing evidence of polymicrobial infection.  Currently clinically improved s/p drainage.     Recommendations and Plan:  -C/w Zosyn for now  -Follow up blood cultures  -Since source control is obtained, would favor short course of antibiotics (5-7 days) unless clinical status changes  -Await culture speciation & sensitivities which would help us tailor PO abx regimen    Attending addendum to follow

## 2017-08-11 ENCOUNTER — TRANSCRIPTION ENCOUNTER (OUTPATIENT)
Age: 32
End: 2017-08-11

## 2017-08-11 ENCOUNTER — APPOINTMENT (OUTPATIENT)
Dept: TRAUMA SURGERY | Facility: CLINIC | Age: 32
End: 2017-08-11

## 2017-08-11 LAB
ALBUMIN SERPL ELPH-MCNC: 3.3 G/DL — SIGNIFICANT CHANGE UP (ref 3.3–5)
ALP SERPL-CCNC: 87 U/L — SIGNIFICANT CHANGE UP (ref 40–120)
ALT FLD-CCNC: 125 U/L — HIGH (ref 10–45)
ANION GAP SERPL CALC-SCNC: 13 MMOL/L — SIGNIFICANT CHANGE UP (ref 5–17)
AST SERPL-CCNC: 67 U/L — HIGH (ref 10–40)
BILIRUB FLD-MCNC: 1.6 MG/DL — SIGNIFICANT CHANGE UP
BILIRUB SERPL-MCNC: 1.3 MG/DL — HIGH (ref 0.2–1.2)
BUN SERPL-MCNC: 10 MG/DL — SIGNIFICANT CHANGE UP (ref 7–23)
CALCIUM SERPL-MCNC: 9.4 MG/DL — SIGNIFICANT CHANGE UP (ref 8.4–10.5)
CHLORIDE SERPL-SCNC: 100 MMOL/L — SIGNIFICANT CHANGE UP (ref 96–108)
CO2 SERPL-SCNC: 25 MMOL/L — SIGNIFICANT CHANGE UP (ref 22–31)
CREAT SERPL-MCNC: 0.77 MG/DL — SIGNIFICANT CHANGE UP (ref 0.5–1.3)
GLUCOSE SERPL-MCNC: 111 MG/DL — HIGH (ref 70–99)
HCT VFR BLD CALC: 27.7 % — LOW (ref 39–50)
HGB BLD-MCNC: 9 G/DL — LOW (ref 13–17)
MAGNESIUM SERPL-MCNC: 2.3 MG/DL — SIGNIFICANT CHANGE UP (ref 1.6–2.6)
MCHC RBC-ENTMCNC: 28.2 PG — SIGNIFICANT CHANGE UP (ref 27–34)
MCHC RBC-ENTMCNC: 32.5 GM/DL — SIGNIFICANT CHANGE UP (ref 32–36)
MCV RBC AUTO: 86.8 FL — SIGNIFICANT CHANGE UP (ref 80–100)
PHOSPHATE SERPL-MCNC: 4.2 MG/DL — SIGNIFICANT CHANGE UP (ref 2.5–4.5)
PLATELET # BLD AUTO: 535 K/UL — HIGH (ref 150–400)
POTASSIUM SERPL-MCNC: 4.2 MMOL/L — SIGNIFICANT CHANGE UP (ref 3.5–5.3)
POTASSIUM SERPL-SCNC: 4.2 MMOL/L — SIGNIFICANT CHANGE UP (ref 3.5–5.3)
PROT SERPL-MCNC: 7.6 G/DL — SIGNIFICANT CHANGE UP (ref 6–8.3)
RBC # BLD: 3.19 M/UL — LOW (ref 4.2–5.8)
RBC # FLD: 14.2 % — SIGNIFICANT CHANGE UP (ref 10.3–14.5)
SODIUM SERPL-SCNC: 138 MMOL/L — SIGNIFICANT CHANGE UP (ref 135–145)
WBC # BLD: 6.06 K/UL — SIGNIFICANT CHANGE UP (ref 3.8–10.5)
WBC # FLD AUTO: 6.06 K/UL — SIGNIFICANT CHANGE UP (ref 3.8–10.5)

## 2017-08-11 PROCEDURE — 99232 SBSQ HOSP IP/OBS MODERATE 35: CPT | Mod: GC

## 2017-08-11 RX ORDER — DOCUSATE SODIUM 100 MG
100 CAPSULE ORAL DAILY
Qty: 0 | Refills: 0 | Status: DISCONTINUED | OUTPATIENT
Start: 2017-08-11 | End: 2017-08-14

## 2017-08-11 RX ORDER — FERROUS SULFATE 325(65) MG
325 TABLET ORAL DAILY
Qty: 0 | Refills: 0 | Status: DISCONTINUED | OUTPATIENT
Start: 2017-08-11 | End: 2017-08-14

## 2017-08-11 RX ADMIN — PIPERACILLIN AND TAZOBACTAM 25 GRAM(S): 4; .5 INJECTION, POWDER, LYOPHILIZED, FOR SOLUTION INTRAVENOUS at 14:32

## 2017-08-11 RX ADMIN — Medication 325 MILLIGRAM(S): at 21:54

## 2017-08-11 RX ADMIN — ENOXAPARIN SODIUM 40 MILLIGRAM(S): 100 INJECTION SUBCUTANEOUS at 12:32

## 2017-08-11 RX ADMIN — PIPERACILLIN AND TAZOBACTAM 25 GRAM(S): 4; .5 INJECTION, POWDER, LYOPHILIZED, FOR SOLUTION INTRAVENOUS at 21:53

## 2017-08-11 RX ADMIN — PIPERACILLIN AND TAZOBACTAM 25 GRAM(S): 4; .5 INJECTION, POWDER, LYOPHILIZED, FOR SOLUTION INTRAVENOUS at 05:03

## 2017-08-11 NOTE — DISCHARGE NOTE ADULT - CARE PLAN
Principal Discharge DX:	History of cholecystectomy  Goal:	return to daily living activity prior to surgery, postoperative recovery  Instructions for follow-up, activity and diet:	Please make the follow-up appointments listed below.    No heavy lifting for 2 weeks following your discharge.

## 2017-08-11 NOTE — PROGRESS NOTE ADULT - SUBJECTIVE AND OBJECTIVE BOX
STATUS POST:  kera collado presents with GB fossa abscess that was drained by IR this admission on 8/9/17    Vital Signs Last 24 Hrs  T(C): 36.7 (11 Aug 2017 08:15), Max: 36.9 (10 Aug 2017 14:44)  T(F): 98 (11 Aug 2017 08:15), Max: 98.4 (10 Aug 2017 14:44)  HR: 72 (11 Aug 2017 08:15) (72 - 98)  BP: 125/72 (11 Aug 2017 08:15) (104/73 - 125/72)  BP(mean): --  RR: 18 (11 Aug 2017 08:15) (18 - 18)  SpO2: 96% (11 Aug 2017 08:15) (96% - 98%)    I&O's Summary    10 Aug 2017 07:01  -  11 Aug 2017 07:00  --------------------------------------------------------  IN: 2320 mL / OUT: 1400 mL / NET: 920 mL        SUBJECTIVE: Pt seen and examined. Comfortable with no complaints. Denies pain and N/V. Ambulating well and tolerating diet. Denies Fever and chills.       Physical Exam:  General Appearance: Appears well, NAD  Abdomen: Soft, nondistended, appropriate incisional tenderness around new drain, dressings clean and dry and intact, OR drain in place with serous output      LABS:                        9.0    6.06  )-----------( 535      ( 11 Aug 2017 07:12 )             27.7     08-11    138  |  100  |  10  ----------------------------<  111<H>  4.2   |  25  |  0.77    Ca    9.4      11 Aug 2017 07:12  Phos  4.2     08-11  Mg     2.3     08-11    TPro  7.6  /  Alb  3.3  /  TBili  1.3<H>  /  DBili  x   /  AST  67<H>  /  ALT  125<H>  /  AlkPhos  87  08-11          Mary Alice Hyatt PA-C  p#[phone]

## 2017-08-11 NOTE — DISCHARGE NOTE ADULT - HOSPITAL COURSE
Hospital Course: 29 year old male with PMHx of choledocholithiasis s/p ERCPx2 and laproscopic partial cholecystectomy (8/1/17) w/ subhepatic JOHANNA drain in place presented with increasing RUQ abdominal pain, fever, and chills. In the ED vitals were significant for tachycardia to 118bpm and fever to 102.9F. Labs demonstrate leukocytosis, elevated t bili (although decreased from discharge from 3 to 2.2), elevated creatinine (1.29 from baseline 0.8, c/w FIDELIA stage 1 likely secondary to hypovolemia). CT Abdomen & Pelvis w/ oral and IV contrast showed fluid and gas collection in the gallbladder fossa suspicious for a subhepatic abscess and showed the surgical drainage catheter tip was not located w/in the gallbladder fossa. IR performed a CT guided drainage of the subhepatic abscess and placed a drain in the gallbladder fossa. Collection fluid was sent for culture and sensitivities. Culture results showed numerous gram(+) cocci in pairs and chains, gram(+) rods and gram(-) rods  and grew Group B Strep agalactiae. ID was consulted and recommended a seven day course of piperacillin/tazobactam. Patient spiked a fever of 102.7 (8/9), blood cultures were drawn but nothing grew after 5 days. Leukocytosis resolved, but patient continued to have mild transaminitis. JOHANNA drain continued to put out a mild amount of serous fluid, analysis of which showed a Bilirubin of 1.6; the JOHANNA drain was pulled 8/14. IR drain continued to put out a mild-moderate amount of serosanguinous on the day of his discharge, 8/14/17. Patient received five days of a seven day course of Zosyn and was discharged on two days of Augmentin with approval from ID. He was instructed to follow-up with Dr. Palacios in 7-10 days after discharge and with IR 1 week after discharge. The patient was discharged on 8/14/17 in stable condition.

## 2017-08-11 NOTE — DISCHARGE NOTE ADULT - MEDICATION SUMMARY - MEDICATIONS TO TAKE
I will START or STAY ON the medications listed below when I get home from the hospital:    IBU  --  by mouth   -- Indication: For Home med    amoxicillin-clavulanate 875 mg-125 mg oral tablet  -- 1 milligram(s) by mouth every 12 hours  -- Finish all this medication unless otherwise directed by prescriber.  Take with food or milk.    -- Indication: For Antibiotic

## 2017-08-11 NOTE — DISCHARGE NOTE ADULT - PATIENT PORTAL LINK FT
“You can access the [facility] Patient Portal, offered by [facility], by registering with the following website: [email or web address]”

## 2017-08-11 NOTE — DISCHARGE NOTE ADULT - CARE PROVIDER_API CALL
Ailyn Silvestre), Surgery  Critical Care  [address]  [address] [address]  Phone: [phone]  Fax: [phone]

## 2017-08-11 NOTE — DISCHARGE NOTE ADULT - PLAN OF CARE
return to daily living activity prior to surgery, postoperative recovery Please make the follow-up appointments listed below.    No heavy lifting for 2 weeks following your discharge.

## 2017-08-11 NOTE — PROGRESS NOTE ADULT - SUBJECTIVE AND OBJECTIVE BOX
CC: F/u for gallbladder abscess    SUBJECTIVE:  Reports feeling well.  Denies fevers, chills.  Has mild, intermittent abd pain with movement.  Reports good PO intake & has been ambulating on the wards.       ROS:  CONSTITUTIONAL:  No fever, good appetite  CARDIOVASCULAR:  No chest pain or palpitations  RESPIRATORY:  No dyspnea  GASTROINTESTINAL:  No nausea, vomiting, diarrhea, or abdominal pain  GENITOURINARY:  No dysuria  NEUROLOGIC:  No headache    Allergies    No Known Allergies    Intolerances        ANTIBIOTICS/RELEVANT:  antimicrobials  piperacillin/tazobactam IVPB. 3.375 Gram(s) IV Intermittent every 8 hours    immunologic:    OTHER:  enoxaparin Injectable 40 milliGRAM(s) SubCutaneous daily  acetaminophen   Tablet. 650 milliGRAM(s) Oral every 6 hours PRN  ibuprofen  Tablet 400 milliGRAM(s) Oral every 6 hours PRN      Objective:  Vital Signs Last 24 Hrs  T(C): 36.7 (11 Aug 2017 08:15), Max: 36.9 (10 Aug 2017 14:44)  T(F): 98 (11 Aug 2017 08:15), Max: 98.4 (10 Aug 2017 14:44)  HR: 72 (11 Aug 2017 08:15) (72 - 98)  BP: 125/72 (11 Aug 2017 08:15) (104/73 - 125/72)  BP(mean): --  RR: 18 (11 Aug 2017 08:15) (18 - 18)  SpO2: 96% (11 Aug 2017 08:15) (96% - 98%)    PHYSICAL EXAM:  Constitutional: Well-appearing, sitting up in bed, talking on cell phone  Eyes: Clear conjunctiva   Ear/Nose/Throat: MMM, no oral lesions	  Respiratory: CTAB  Cardiovascular: RRR  Gastrointestinal:  JOHANNA drain in RUQ w/ 25cc serosanguinous fluid, JOHANNA in RLQ w/ 5-7cc of dark yellow fluid, both sites C/D/I & non-tender to palpation   Extremities: No LE edema      LABS:                        9.0    6.06  )-----------( 535      ( 11 Aug 2017 07:12 )             27.7     08-11    138  |  100  |  10  ----------------------------<  111<H>  4.2   |  25  |  0.77    Ca    9.4      11 Aug 2017 07:12  Phos  4.2     08-11  Mg     2.3     08-11    TPro  7.6  /  Alb  3.3  /  TBili  1.3<H>  /  DBili  x   /  AST  67<H>  /  ALT  125<H>  /  AlkPhos  87  08-11        RECENT CULTURES: Culture - Body Fluid with Gram Stain (08.09.17 @ 21:48)    Gram Stain:   Numerous polymorphonuclear leukocytes per low power field  Numerous Gram Positive Cocci in Pairs and Chains per oil power field  Moderate Gram Positive Rods per oil power field  Moderate Gram Negative Rods per oil power field    Specimen Source: .Body Fluid    Culture Results:   Numerous Streptococcus agalactiae (Group B)      RADIOLOGY & ADDITIONAL STUDIES:   EXAM:  CT ABDOMEN AND PELVIS OC IC                            PROCEDURE DATE:  08/09/2017            INTERPRETATION:    CLINICAL INFORMATION: Fever and abdominal pain. Status post partial   cholecystectomy one week ago. Rule out abscess.    COMPARISON: MRI abdomen from 7/26/2017    PROCEDURE:   CT of the Abdomen and Pelvis was performed with intravenous contrast.   Intravenous contrast: 90 ml Omnipaque 350. 10 ml discarded.  Oral contrast: positive contrast was administered.  Sagittal and coronal reformats were performed.    FINDINGS:    LOWER CHEST: Within normal limits.    LIVER: Within normal limits.  BILE DUCTS: Normal caliber.  GALLBLADDER: Status post cholecystectomy. Fluid collection containing   multiple foci of air with an air-fluid level in the gallbladder fossa   measuring 9 x 5.2 cm in greatest transaxial dimension and 6 cm in   craniocaudad dimension. Right upper quadrant surgical drain whose tip is   located deep to the left hepatic lobe at the level of the falciform   ligament.  SPLEEN: Within normal limits.  PANCREAS: Within normal limits.  ADRENALS: Within normal limits.  KIDNEYS/URETERS: Within normal limits.    URINARY BLADDER: Within normal limits.  REPRODUCTIVE ORGANS: The prostate is within normal limits.    BOWEL/MESENTERY: No bowel obstruction. Normal appendix.  PERITONEUM/RETROPERITONEUM: Right upper quadrant drainage catheter and   fluid collection in the gallbladder fossa as described above.    VESSELS:  Within normal limits.  LYMPH NODES: No abdominal or pelvic lymphadenopathy.  SOFT TISSUES: Small fat-containing umbilical hernia.  BONES: Within normal limits.    IMPRESSION:   Status post cholecystectomy.  Fluid and gas collection in the gallbladder fossa which given reported   fever is suspicious for an abscess. Right upper quadrant surgical   drainage catheter whose tip is not located within the gallbladder fossa.                JUWAN TAVAREZ M.D., RADIOLOGY RESIDENT  This document has been electronically signed.  PEG PEREZ M.D., ATTENDINGRADIOLOGIST  This document has been electronically signed. Aug  9 2017  4:06AM CC: F/u for gallbladder abscess    SUBJECTIVE:  Reports feeling well.  Denies fevers, chills.  Has mild, intermittent abd pain with movement.  Reports good PO intake & has been ambulating on the wards.       ROS:  CONSTITUTIONAL:  No fever, good appetite  CARDIOVASCULAR:  No chest pain or palpitations  RESPIRATORY:  No dyspnea  GASTROINTESTINAL:  No nausea, vomiting, diarrhea, or abdominal pain  GENITOURINARY:  No dysuria  NEUROLOGIC:  No headache    Allergies    No Known Allergies    Intolerances        ANTIBIOTICS/RELEVANT:  antimicrobials  piperacillin/tazobactam IVPB. 3.375 Gram(s) IV Intermittent every 8 hours  8/9 -->    immunologic:    OTHER:  enoxaparin Injectable 40 milliGRAM(s) SubCutaneous daily  acetaminophen   Tablet. 650 milliGRAM(s) Oral every 6 hours PRN  ibuprofen  Tablet 400 milliGRAM(s) Oral every 6 hours PRN      Objective:  Vital Signs Last 24 Hrs  T(C): 36.7 (11 Aug 2017 08:15), Max: 36.9 (10 Aug 2017 14:44)  T(F): 98 (11 Aug 2017 08:15), Max: 98.4 (10 Aug 2017 14:44)  HR: 72 (11 Aug 2017 08:15) (72 - 98)  BP: 125/72 (11 Aug 2017 08:15) (104/73 - 125/72)  BP(mean): --  RR: 18 (11 Aug 2017 08:15) (18 - 18)  SpO2: 96% (11 Aug 2017 08:15) (96% - 98%)    PHYSICAL EXAM:  Constitutional: Well-appearing, sitting up in bed, talking on cell phone  Eyes: Clear conjunctiva   Ear/Nose/Throat: MMM, no oral lesions	  Respiratory: CTAB  Cardiovascular: RRR  Gastrointestinal:  JOHANNA drain in RUQ w/ 25cc serosanguinous fluid, JOHANNA in RLQ w/ 5-7cc of dark yellow fluid, both sites C/D/I & non-tender to palpation   Extremities: No LE edema      LABS:                        9.0    6.06  )-----------( 535      ( 11 Aug 2017 07:12 )             27.7     08-11    138  |  100  |  10  ----------------------------<  111<H>  4.2   |  25  |  0.77    Ca    9.4      11 Aug 2017 07:12  Phos  4.2     08-11  Mg     2.3     08-11    TPro  7.6  /  Alb  3.3  /  TBili  1.3<H>  /  DBili  x   /  AST  67<H>  /  ALT  125<H>  /  AlkPhos  87  08-11        RECENT CULTURES: Culture - Body Fluid with Gram Stain (08.09.17 @ 21:48)    Gram Stain:   Numerous polymorphonuclear leukocytes per low power field  Numerous Gram Positive Cocci in Pairs and Chains per oil power field  Moderate Gram Positive Rods per oil power field  Moderate Gram Negative Rods per oil power field    Specimen Source: .Body Fluid    Culture Results:   Numerous Streptococcus agalactiae (Group B)      RADIOLOGY & ADDITIONAL STUDIES:   EXAM:  CT ABDOMEN AND PELVIS OC IC                            PROCEDURE DATE:  08/09/2017            INTERPRETATION:    CLINICAL INFORMATION: Fever and abdominal pain. Status post partial   cholecystectomy one week ago. Rule out abscess.    COMPARISON: MRI abdomen from 7/26/2017    PROCEDURE:   CT of the Abdomen and Pelvis was performed with intravenous contrast.   Intravenous contrast: 90 ml Omnipaque 350. 10 ml discarded.  Oral contrast: positive contrast was administered.  Sagittal and coronal reformats were performed.    FINDINGS:    LOWER CHEST: Within normal limits.    LIVER: Within normal limits.  BILE DUCTS: Normal caliber.  GALLBLADDER: Status post cholecystectomy. Fluid collection containing   multiple foci of air with an air-fluid level in the gallbladder fossa   measuring 9 x 5.2 cm in greatest transaxial dimension and 6 cm in   craniocaudad dimension. Right upper quadrant surgical drain whose tip is   located deep to the left hepatic lobe at the level of the falciform   ligament.  SPLEEN: Within normal limits.  PANCREAS: Within normal limits.  ADRENALS: Within normal limits.  KIDNEYS/URETERS: Within normal limits.    URINARY BLADDER: Within normal limits.  REPRODUCTIVE ORGANS: The prostate is within normal limits.    BOWEL/MESENTERY: No bowel obstruction. Normal appendix.  PERITONEUM/RETROPERITONEUM: Right upper quadrant drainage catheter and   fluid collection in the gallbladder fossa as described above.    VESSELS:  Within normal limits.  LYMPH NODES: No abdominal or pelvic lymphadenopathy.  SOFT TISSUES: Small fat-containing umbilical hernia.  BONES: Within normal limits.    IMPRESSION:   Status post cholecystectomy.  Fluid and gas collection in the gallbladder fossa which given reported   fever is suspicious for an abscess. Right upper quadrant surgical   drainage catheter whose tip is not located within the gallbladder fossa.                JUWAN TAVAREZ M.D., RADIOLOGY RESIDENT  This document has been electronically signed.  PEG PEREZ M.D., ATTENDINGRADIOLOGIST  This document has been electronically signed. Aug  9 2017  4:06AM

## 2017-08-12 LAB
ALBUMIN SERPL ELPH-MCNC: 3.4 G/DL — SIGNIFICANT CHANGE UP (ref 3.3–5)
ALP SERPL-CCNC: 92 U/L — SIGNIFICANT CHANGE UP (ref 40–120)
ALT FLD-CCNC: 120 U/L — HIGH (ref 10–45)
ANION GAP SERPL CALC-SCNC: 16 MMOL/L — SIGNIFICANT CHANGE UP (ref 5–17)
AST SERPL-CCNC: 56 U/L — HIGH (ref 10–40)
BILIRUB SERPL-MCNC: 1.2 MG/DL — SIGNIFICANT CHANGE UP (ref 0.2–1.2)
BUN SERPL-MCNC: 12 MG/DL — SIGNIFICANT CHANGE UP (ref 7–23)
CALCIUM SERPL-MCNC: 10.1 MG/DL — SIGNIFICANT CHANGE UP (ref 8.4–10.5)
CHLORIDE SERPL-SCNC: 101 MMOL/L — SIGNIFICANT CHANGE UP (ref 96–108)
CO2 SERPL-SCNC: 22 MMOL/L — SIGNIFICANT CHANGE UP (ref 22–31)
CREAT SERPL-MCNC: 0.98 MG/DL — SIGNIFICANT CHANGE UP (ref 0.5–1.3)
GLUCOSE SERPL-MCNC: 100 MG/DL — HIGH (ref 70–99)
HBA1C BLD-MCNC: 5.7 % — HIGH (ref 4–5.6)
HCT VFR BLD CALC: 29.5 % — LOW (ref 39–50)
HGB BLD-MCNC: 9.5 G/DL — LOW (ref 13–17)
MAGNESIUM SERPL-MCNC: 2.2 MG/DL — SIGNIFICANT CHANGE UP (ref 1.6–2.6)
MCHC RBC-ENTMCNC: 27.9 PG — SIGNIFICANT CHANGE UP (ref 27–34)
MCHC RBC-ENTMCNC: 32.2 GM/DL — SIGNIFICANT CHANGE UP (ref 32–36)
MCV RBC AUTO: 86.8 FL — SIGNIFICANT CHANGE UP (ref 80–100)
PHOSPHATE SERPL-MCNC: 4.1 MG/DL — SIGNIFICANT CHANGE UP (ref 2.5–4.5)
PLATELET # BLD AUTO: 583 K/UL — HIGH (ref 150–400)
POTASSIUM SERPL-MCNC: 4.3 MMOL/L — SIGNIFICANT CHANGE UP (ref 3.5–5.3)
POTASSIUM SERPL-SCNC: 4.3 MMOL/L — SIGNIFICANT CHANGE UP (ref 3.5–5.3)
PROT SERPL-MCNC: 7.8 G/DL — SIGNIFICANT CHANGE UP (ref 6–8.3)
RBC # BLD: 3.4 M/UL — LOW (ref 4.2–5.8)
RBC # FLD: 14.3 % — SIGNIFICANT CHANGE UP (ref 10.3–14.5)
SODIUM SERPL-SCNC: 139 MMOL/L — SIGNIFICANT CHANGE UP (ref 135–145)
WBC # BLD: 6.22 K/UL — SIGNIFICANT CHANGE UP (ref 3.8–10.5)
WBC # FLD AUTO: 6.22 K/UL — SIGNIFICANT CHANGE UP (ref 3.8–10.5)

## 2017-08-12 RX ADMIN — Medication 325 MILLIGRAM(S): at 11:21

## 2017-08-12 RX ADMIN — ENOXAPARIN SODIUM 40 MILLIGRAM(S): 100 INJECTION SUBCUTANEOUS at 11:21

## 2017-08-12 RX ADMIN — PIPERACILLIN AND TAZOBACTAM 25 GRAM(S): 4; .5 INJECTION, POWDER, LYOPHILIZED, FOR SOLUTION INTRAVENOUS at 22:01

## 2017-08-12 RX ADMIN — PIPERACILLIN AND TAZOBACTAM 25 GRAM(S): 4; .5 INJECTION, POWDER, LYOPHILIZED, FOR SOLUTION INTRAVENOUS at 05:15

## 2017-08-12 RX ADMIN — PIPERACILLIN AND TAZOBACTAM 25 GRAM(S): 4; .5 INJECTION, POWDER, LYOPHILIZED, FOR SOLUTION INTRAVENOUS at 13:50

## 2017-08-12 NOTE — PROGRESS NOTE ADULT - SUBJECTIVE AND OBJECTIVE BOX
ACS Progress Note    S: Patient seen and examined. No acute events overnight. Pain well controlled with current regimen. Denies nausea/vomiting. Endorses passing gas and bowel movements.     O:  Vital Signs Last 24 Hrs  T(C): 36.7 (12 Aug 2017 17:12), Max: 36.8 (12 Aug 2017 00:32)  T(F): 98.1 (12 Aug 2017 17:12), Max: 98.2 (12 Aug 2017 00:32)  HR: 88 (12 Aug 2017 17:12) (74 - 90)  BP: 122/75 (12 Aug 2017 17:12) (112/74 - 134/80)  BP(mean): --  RR: 16 (12 Aug 2017 17:12) (16 - 18)  SpO2: 97% (12 Aug 2017 17:12) (95% - 97%)    I&O's Detail    11 Aug 2017 07:01  -  12 Aug 2017 07:00  --------------------------------------------------------  IN:    Oral Fluid: 1785 mL  Total IN: 1785 mL    OUT:    Bulb: 40 mL    Bulb: 15 mL  Total OUT: 55 mL    Total NET: 1730 mL      12 Aug 2017 07:01  -  12 Aug 2017 19:48  --------------------------------------------------------  IN:    Oral Fluid: 480 mL  Total IN: 480 mL    OUT:    Bulb: 10 mL    Bulb: 30 mL  Total OUT: 40 mL    Total NET: 440 mL          MEDICATIONS  (STANDING):  piperacillin/tazobactam IVPB. 3.375 Gram(s) IV Intermittent every 8 hours  enoxaparin Injectable 40 milliGRAM(s) SubCutaneous daily  ferrous    sulfate 325 milliGRAM(s) Oral daily  docusate sodium 100 milliGRAM(s) Oral daily    MEDICATIONS  (PRN):  acetaminophen   Tablet. 650 milliGRAM(s) Oral every 6 hours PRN Mild to Moderate pain  ibuprofen  Tablet 400 milliGRAM(s) Oral every 6 hours PRN moderate pain                            9.5    6.22  )-----------( 583      ( 12 Aug 2017 08:39 )             29.5       08-12    139  |  101  |  12  ----------------------------<  100<H>  4.3   |  22  |  0.98    Ca    10.1      12 Aug 2017 08:48  Phos  4.1     08-12  Mg     2.2     08-12    TPro  7.8  /  Alb  3.4  /  TBili  1.2  /  DBili  x   /  AST  56<H>  /  ALT  120<H>  /  AlkPhos  92  08-12      Physical Exam:  Gen: Laying in bed, NAD, alert and oriented.   Resp: Unlabored breathing  Abd: soft, NTND, JOHANNA in right abd with serous, IR drain in superior R abdomen with SS output

## 2017-08-13 LAB
CULTURE RESULTS: SIGNIFICANT CHANGE UP
SPECIMEN SOURCE: SIGNIFICANT CHANGE UP

## 2017-08-13 RX ADMIN — ENOXAPARIN SODIUM 40 MILLIGRAM(S): 100 INJECTION SUBCUTANEOUS at 14:50

## 2017-08-13 RX ADMIN — PIPERACILLIN AND TAZOBACTAM 25 GRAM(S): 4; .5 INJECTION, POWDER, LYOPHILIZED, FOR SOLUTION INTRAVENOUS at 05:48

## 2017-08-13 RX ADMIN — Medication 325 MILLIGRAM(S): at 14:50

## 2017-08-13 RX ADMIN — PIPERACILLIN AND TAZOBACTAM 25 GRAM(S): 4; .5 INJECTION, POWDER, LYOPHILIZED, FOR SOLUTION INTRAVENOUS at 14:50

## 2017-08-13 RX ADMIN — Medication 100 MILLIGRAM(S): at 14:50

## 2017-08-13 RX ADMIN — PIPERACILLIN AND TAZOBACTAM 25 GRAM(S): 4; .5 INJECTION, POWDER, LYOPHILIZED, FOR SOLUTION INTRAVENOUS at 22:41

## 2017-08-13 NOTE — PROGRESS NOTE ADULT - SUBJECTIVE AND OBJECTIVE BOX
ACS Progress Note    S: Patient seen and examined. No acute events overnight. Pain well controlled with current regimen.   Denies nausea/vomiting. Endorses passing gas and bowel movements.    O:  Vital Signs Last 24 Hrs  T(C): 36.7 (13 Aug 2017 21:11), Max: 36.9 (13 Aug 2017 13:36)  T(F): 98.1 (13 Aug 2017 21:11), Max: 98.4 (13 Aug 2017 13:36)  HR: 80 (13 Aug 2017 21:11) (77 - 84)  BP: 130/81 (13 Aug 2017 21:11) (107/67 - 136/80)  BP(mean): --  RR: 18 (13 Aug 2017 21:11) (16 - 18)  SpO2: 96% (13 Aug 2017 21:11) (95% - 98%)    I&O's Detail    12 Aug 2017 07:01  -  13 Aug 2017 07:00  --------------------------------------------------------  IN:    Oral Fluid: 1480 mL  Total IN: 1480 mL    OUT:    Bulb: 35 mL    Bulb: 55 mL    Voided: 650 mL  Total OUT: 740 mL    Total NET: 740 mL      13 Aug 2017 07:01  -  13 Aug 2017 21:11  --------------------------------------------------------  IN:    Oral Fluid: 580 mL  Total IN: 580 mL    OUT:    Bulb: 5 mL    Bulb: 5 mL    Voided: 400 mL  Total OUT: 410 mL    Total NET: 170 mL      MEDICATIONS  (STANDING):  piperacillin/tazobactam IVPB. 3.375 Gram(s) IV Intermittent every 8 hours  enoxaparin Injectable 40 milliGRAM(s) SubCutaneous daily  ferrous    sulfate 325 milliGRAM(s) Oral daily  docusate sodium 100 milliGRAM(s) Oral daily    MEDICATIONS  (PRN):  acetaminophen   Tablet. 650 milliGRAM(s) Oral every 6 hours PRN Mild to Moderate pain  ibuprofen  Tablet 400 milliGRAM(s) Oral every 6 hours PRN moderate pain                            9.5    6.22  )-----------( 583      ( 12 Aug 2017 08:39 )             29.5       08-12    139  |  101  |  12  ----------------------------<  100<H>  4.3   |  22  |  0.98    Ca    10.1      12 Aug 2017 08:48  Phos  4.1     08-12  Mg     2.2     08-12    TPro  7.8  /  Alb  3.4  /  TBili  1.2  /  DBili  x   /  AST  56<H>  /  ALT  120<H>  /  AlkPhos  92  08-12      Physical Exam:  Gen: Laying in bed, NAD, alert and oriented.   Resp: Unlabored breathing  Abd: soft, NTND, JOHANNA in right abd with serous, IR drain in superior R abdomen with SS output    Lines:   IV: patent, in place.

## 2017-08-14 VITALS
SYSTOLIC BLOOD PRESSURE: 110 MMHG | TEMPERATURE: 98 F | DIASTOLIC BLOOD PRESSURE: 69 MMHG | OXYGEN SATURATION: 97 % | HEART RATE: 92 BPM | RESPIRATION RATE: 18 BRPM

## 2017-08-14 LAB
CULTURE RESULTS: SIGNIFICANT CHANGE UP
CULTURE RESULTS: SIGNIFICANT CHANGE UP
SPECIMEN SOURCE: SIGNIFICANT CHANGE UP
SPECIMEN SOURCE: SIGNIFICANT CHANGE UP

## 2017-08-14 PROCEDURE — 83036 HEMOGLOBIN GLYCOSYLATED A1C: CPT

## 2017-08-14 PROCEDURE — 87075 CULTR BACTERIA EXCEPT BLOOD: CPT

## 2017-08-14 PROCEDURE — 85730 THROMBOPLASTIN TIME PARTIAL: CPT

## 2017-08-14 PROCEDURE — C1729: CPT

## 2017-08-14 PROCEDURE — 84100 ASSAY OF PHOSPHORUS: CPT

## 2017-08-14 PROCEDURE — 83690 ASSAY OF LIPASE: CPT

## 2017-08-14 PROCEDURE — 87205 SMEAR GRAM STAIN: CPT

## 2017-08-14 PROCEDURE — 96374 THER/PROPH/DIAG INJ IV PUSH: CPT

## 2017-08-14 PROCEDURE — 74177 CT ABD & PELVIS W/CONTRAST: CPT

## 2017-08-14 PROCEDURE — 80053 COMPREHEN METABOLIC PANEL: CPT

## 2017-08-14 PROCEDURE — 82247 BILIRUBIN TOTAL: CPT

## 2017-08-14 PROCEDURE — 87070 CULTURE OTHR SPECIMN AEROBIC: CPT

## 2017-08-14 PROCEDURE — 71046 X-RAY EXAM CHEST 2 VIEWS: CPT

## 2017-08-14 PROCEDURE — 85027 COMPLETE CBC AUTOMATED: CPT

## 2017-08-14 PROCEDURE — C1769: CPT

## 2017-08-14 PROCEDURE — 83735 ASSAY OF MAGNESIUM: CPT

## 2017-08-14 PROCEDURE — 85610 PROTHROMBIN TIME: CPT

## 2017-08-14 PROCEDURE — 99285 EMERGENCY DEPT VISIT HI MDM: CPT | Mod: 25

## 2017-08-14 PROCEDURE — 49406 IMAGE CATH FLUID PERI/RETRO: CPT

## 2017-08-14 PROCEDURE — 87040 BLOOD CULTURE FOR BACTERIA: CPT

## 2017-08-14 PROCEDURE — 81003 URINALYSIS AUTO W/O SCOPE: CPT

## 2017-08-14 RX ADMIN — ENOXAPARIN SODIUM 40 MILLIGRAM(S): 100 INJECTION SUBCUTANEOUS at 11:25

## 2017-08-14 RX ADMIN — PIPERACILLIN AND TAZOBACTAM 25 GRAM(S): 4; .5 INJECTION, POWDER, LYOPHILIZED, FOR SOLUTION INTRAVENOUS at 06:05

## 2017-08-14 RX ADMIN — PIPERACILLIN AND TAZOBACTAM 25 GRAM(S): 4; .5 INJECTION, POWDER, LYOPHILIZED, FOR SOLUTION INTRAVENOUS at 13:08

## 2017-08-14 RX ADMIN — Medication 325 MILLIGRAM(S): at 11:25

## 2017-08-14 NOTE — PROGRESS NOTE ADULT - SUBJECTIVE AND OBJECTIVE BOX
ACS Progress Note    S: Patient seen and examined. No acute events overnight. Pain well controlled with current regimen.   Denies nausea/vomiting. Ready to go home  Endorses passing gas and bowel movements.     O:  Vital Signs Last 24 Hrs  T(C): 36.6 (14 Aug 2017 04:38), Max: 36.9 (13 Aug 2017 13:36)  T(F): 97.9 (14 Aug 2017 04:38), Max: 98.4 (13 Aug 2017 13:36)  HR: 85 (14 Aug 2017 04:38) (80 - 85)  BP: 118/71 (14 Aug 2017 04:38) (107/67 - 136/80)  BP(mean): --  RR: 18 (14 Aug 2017 04:38) (18 - 18)  SpO2: 97% (14 Aug 2017 04:38) (95% - 98%)    I&O's Detail    12 Aug 2017 07:01  -  13 Aug 2017 07:00  --------------------------------------------------------  IN:    Oral Fluid: 1480 mL  Total IN: 1480 mL    OUT:    Bulb: 35 mL    Bulb: 55 mL    Voided: 650 mL  Total OUT: 740 mL    Total NET: 740 mL      13 Aug 2017 07:01  -  14 Aug 2017 05:39  --------------------------------------------------------  IN:    IV PiggyBack: 100 mL    Oral Fluid: 580 mL  Total IN: 680 mL    OUT:    Bulb: 13 mL    Bulb: 13 mL    Voided: 400 mL  Total OUT: 426 mL    Total NET: 254 mL          MEDICATIONS  (STANDING):  piperacillin/tazobactam IVPB. 3.375 Gram(s) IV Intermittent every 8 hours  enoxaparin Injectable 40 milliGRAM(s) SubCutaneous daily  ferrous    sulfate 325 milliGRAM(s) Oral daily  docusate sodium 100 milliGRAM(s) Oral daily    MEDICATIONS  (PRN):  acetaminophen   Tablet. 650 milliGRAM(s) Oral every 6 hours PRN Mild to Moderate pain  ibuprofen  Tablet 400 milliGRAM(s) Oral every 6 hours PRN moderate pain                            9.5    6.22  )-----------( 583      ( 12 Aug 2017 08:39 )             29.5       08-12    139  |  101  |  12  ----------------------------<  100<H>  4.3   |  22  |  0.98    Ca    10.1      12 Aug 2017 08:48  Phos  4.1     08-12  Mg     2.2     08-12    TPro  7.8  /  Alb  3.4  /  TBili  1.2  /  DBili  x   /  AST  56<H>  /  ALT  120<H>  /  AlkPhos  92  08-12      Physical Exam:  Physical Exam:  Gen: Laying in bed, NAD, alert and oriented.   Resp: Unlabored breathing  Abd: soft, NTND, JOHANNA in right abd with serous, IR drain in superior R abdomen with SS output      Lines:   IV: patent, in place.

## 2017-08-14 NOTE — PROGRESS NOTE ADULT - ASSESSMENT
Assessment:   32M hx choledocholithiasis s/p lap subtotal cholecystectomy on 8/1, admitted w/ sepsis 2/2 large gallbladder fossa abscess now s/p CT guided drainage on 8/9 w/ JOHANNA drain in place. Culture of fluid growing multiple organisms, one speciated to GBS w/ sensitivities still pending.  Overall pt clinically improved s/p drainage, has been afebrile for almost 48hr w/ resolution of leukocytosis    Recommendations and Plan:  -C/w Zosyn for now  -Await further culture speciation & sensitivities which would help us tailor PO abx regimen, anticipate total 7 day course of abx  -Management of drains as per surgery & IR      Attending addendum to follow
32 year old male s/p Lap cholecystectomy found to have gallbladder fossa abscess   -DVT PPX   -OOB as tolerated  -Continue zosyn  -F/u culture sensitivities  -Continue to monitor drain outputs  -Regular diet
32 year old male s/p Lap cholecystectomy found to have gallbladder fossa abscess   -DVT PPX   -OOB as tolerated  -Continue zosyn  -F/u culture speciation   -Continue to monitor drain outputs  -Regular diet
32 year old male s/p Lap cholecystectomy found to have gallbladder fossa abscess   -DVT PPX   -OOB as tolerated  -F/U IR drainage of abscess   -will advance to a regular diet once patient is drained     Farheen Noguera PA-C  p#[id number]  -F/U AM lab work
32 year old male s/p Lap cholecystectomy found to have gallbladder fossa abscess now s/p IR drain (8/9)    -DVT PPX   -OOB as tolerated  -Continue zosyn as per ID and await sensitivities from cultures  -Appreciate ID consult  -Continue to monitor drain outputs  -JOHANNA Bilirubin 1.6: OR drain may be removed prior to DC home  -C/W daily flushes of IR drain  -Regular diet  -Dispo planning for tomorrow pending ABX plan
32 year old male s/p Lap cholecystectomy found to have gallbladder fossa abscess.    -DVT PPX   -OOB as tolerated  -Continue zosyn  -F/u culture sensitivities, then D/C surgical drain and discharge likely today  -Continue to monitor drain outputs  -Regular diet
32 year old male s/p Lap cholecystectomy found to have gallbladder fossa abscess.  -DVT PPX   -OOB as tolerated  -Continue zosyn  -F/u culture sensitivities  -Continue to monitor drain outputs  -Regular diet

## 2017-08-14 NOTE — PROGRESS NOTE ADULT - ATTENDING COMMENTS
Feels and looks well. Small amount serosanguinous fluid in lower JOHANNA drain. afebrile, normal wbc - Fluid from gall bladder fossa collection included GNRs on gram stain - growing Group B Strep agalactiae.  Continue Zosyn 8/9--->  7 day course anticipated.    Please call ID if needed over weekend.
GB fossa abscess s/p lap heaven  - continue IR drain  - d/c surgical drain (pulled on rounds)  - PO abx to finish 1 week abx  - f/u with surgery and IR after discharge
will likely discontinue one of the JOHANNA drains.  The one that was not working and has clear serous drainage  antibiotic management per infectious disease  presently on I.V. antibiotic

## 2017-08-21 ENCOUNTER — APPOINTMENT (OUTPATIENT)
Dept: CT IMAGING | Facility: HOSPITAL | Age: 32
End: 2017-08-21

## 2017-08-21 ENCOUNTER — OUTPATIENT (OUTPATIENT)
Dept: OUTPATIENT SERVICES | Facility: HOSPITAL | Age: 32
LOS: 1 days | End: 2017-08-21
Payer: SELF-PAY

## 2017-08-21 DIAGNOSIS — K65.1 PERITONEAL ABSCESS: ICD-10-CM

## 2017-08-21 DIAGNOSIS — Z90.49 ACQUIRED ABSENCE OF OTHER SPECIFIED PARTS OF DIGESTIVE TRACT: Chronic | ICD-10-CM

## 2017-08-21 PROCEDURE — 76080 X-RAY EXAM OF FISTULA: CPT | Mod: 26

## 2017-08-21 PROCEDURE — 49424 ASSESS CYST CONTRAST INJECT: CPT

## 2017-08-21 PROCEDURE — 74150 CT ABDOMEN W/O CONTRAST: CPT | Mod: 26

## 2017-08-21 PROCEDURE — 76080 X-RAY EXAM OF FISTULA: CPT

## 2017-08-21 PROCEDURE — 74150 CT ABDOMEN W/O CONTRAST: CPT

## 2017-08-25 DIAGNOSIS — K82.3 FISTULA OF GALLBLADDER: ICD-10-CM

## 2017-08-25 DIAGNOSIS — Z43.4 ENCOUNTER FOR ATTENTION TO OTHER ARTIFICIAL OPENINGS OF DIGESTIVE TRACT: ICD-10-CM

## 2017-08-30 ENCOUNTER — OUTPATIENT (OUTPATIENT)
Dept: OUTPATIENT SERVICES | Facility: HOSPITAL | Age: 32
LOS: 1 days | End: 2017-08-30
Payer: COMMERCIAL

## 2017-08-30 DIAGNOSIS — K65.1 PERITONEAL ABSCESS: ICD-10-CM

## 2017-08-30 DIAGNOSIS — Z90.49 ACQUIRED ABSENCE OF OTHER SPECIFIED PARTS OF DIGESTIVE TRACT: Chronic | ICD-10-CM

## 2017-08-30 PROCEDURE — 76080 X-RAY EXAM OF FISTULA: CPT | Mod: 26

## 2017-08-30 PROCEDURE — 49424 ASSESS CYST CONTRAST INJECT: CPT

## 2017-08-30 PROCEDURE — 76080 X-RAY EXAM OF FISTULA: CPT

## 2017-09-13 ENCOUNTER — OUTPATIENT (OUTPATIENT)
Dept: OUTPATIENT SERVICES | Facility: HOSPITAL | Age: 32
LOS: 1 days | End: 2017-09-13
Payer: COMMERCIAL

## 2017-09-13 DIAGNOSIS — Z90.49 ACQUIRED ABSENCE OF OTHER SPECIFIED PARTS OF DIGESTIVE TRACT: Chronic | ICD-10-CM

## 2017-09-13 DIAGNOSIS — K63.2 FISTULA OF INTESTINE: ICD-10-CM

## 2017-09-13 PROCEDURE — 76080 X-RAY EXAM OF FISTULA: CPT | Mod: 26

## 2017-09-13 PROCEDURE — 49424 ASSESS CYST CONTRAST INJECT: CPT

## 2017-09-13 PROCEDURE — 76080 X-RAY EXAM OF FISTULA: CPT

## 2017-09-15 DIAGNOSIS — Z43.4 ENCOUNTER FOR ATTENTION TO OTHER ARTIFICIAL OPENINGS OF DIGESTIVE TRACT: ICD-10-CM

## 2017-09-15 DIAGNOSIS — T81.89XD OTHER COMPLICATIONS OF PROCEDURES, NOT ELSEWHERE CLASSIFIED, SUBSEQUENT ENCOUNTER: ICD-10-CM

## 2017-09-15 DIAGNOSIS — K82.3 FISTULA OF GALLBLADDER: ICD-10-CM

## 2017-09-19 ENCOUNTER — APPOINTMENT (OUTPATIENT)
Dept: TRAUMA SURGERY | Facility: CLINIC | Age: 32
End: 2017-09-19
Payer: COMMERCIAL

## 2017-09-19 VITALS
SYSTOLIC BLOOD PRESSURE: 137 MMHG | TEMPERATURE: 98.1 F | BODY MASS INDEX: 36.1 KG/M2 | WEIGHT: 230 LBS | RESPIRATION RATE: 18 BRPM | HEIGHT: 67 IN | HEART RATE: 97 BPM | DIASTOLIC BLOOD PRESSURE: 86 MMHG

## 2017-09-19 DIAGNOSIS — Z82.49 FAMILY HISTORY OF ISCHEMIC HEART DISEASE AND OTHER DISEASES OF THE CIRCULATORY SYSTEM: ICD-10-CM

## 2017-09-19 DIAGNOSIS — Z78.9 OTHER SPECIFIED HEALTH STATUS: ICD-10-CM

## 2017-09-19 DIAGNOSIS — K81.0 ACUTE CHOLECYSTITIS: ICD-10-CM

## 2017-09-19 PROCEDURE — 99024 POSTOP FOLLOW-UP VISIT: CPT

## 2017-09-19 RX ORDER — UBIDECARENONE/VIT E ACET 100MG-5
1000 CAPSULE ORAL
Refills: 0 | Status: ACTIVE | COMMUNITY

## 2017-10-06 ENCOUNTER — EMERGENCY (EMERGENCY)
Facility: HOSPITAL | Age: 32
LOS: 1 days | Discharge: HOME | End: 2017-10-06
Attending: EMERGENCY MEDICINE | Admitting: EMERGENCY MEDICINE
Payer: SELF-PAY

## 2017-10-06 VITALS
TEMPERATURE: 98 F | DIASTOLIC BLOOD PRESSURE: 85 MMHG | SYSTOLIC BLOOD PRESSURE: 134 MMHG | HEART RATE: 96 BPM | OXYGEN SATURATION: 96 %

## 2017-10-06 VITALS
DIASTOLIC BLOOD PRESSURE: 88 MMHG | RESPIRATION RATE: 18 BRPM | OXYGEN SATURATION: 97 % | TEMPERATURE: 98 F | HEART RATE: 118 BPM | WEIGHT: 250 LBS | SYSTOLIC BLOOD PRESSURE: 150 MMHG

## 2017-10-06 DIAGNOSIS — Z90.49 ACQUIRED ABSENCE OF OTHER SPECIFIED PARTS OF DIGESTIVE TRACT: Chronic | ICD-10-CM

## 2017-10-06 LAB
ALBUMIN SERPL ELPH-MCNC: 4 G/DL — SIGNIFICANT CHANGE UP (ref 3.3–5)
ALP SERPL-CCNC: 69 U/L — SIGNIFICANT CHANGE UP (ref 40–120)
ALT FLD-CCNC: 50 U/L RC — HIGH (ref 10–45)
ANION GAP SERPL CALC-SCNC: 13 MMOL/L — SIGNIFICANT CHANGE UP (ref 5–17)
AST SERPL-CCNC: 33 U/L — SIGNIFICANT CHANGE UP (ref 10–40)
BASOPHILS # BLD AUTO: 0.1 K/UL — SIGNIFICANT CHANGE UP (ref 0–0.2)
BASOPHILS NFR BLD AUTO: 0.8 % — SIGNIFICANT CHANGE UP (ref 0–2)
BILIRUB SERPL-MCNC: 0.5 MG/DL — SIGNIFICANT CHANGE UP (ref 0.2–1.2)
BUN SERPL-MCNC: 21 MG/DL — SIGNIFICANT CHANGE UP (ref 7–23)
CALCIUM SERPL-MCNC: 9.6 MG/DL — SIGNIFICANT CHANGE UP (ref 8.4–10.5)
CHLORIDE SERPL-SCNC: 103 MMOL/L — SIGNIFICANT CHANGE UP (ref 96–108)
CO2 SERPL-SCNC: 26 MMOL/L — SIGNIFICANT CHANGE UP (ref 22–31)
CREAT SERPL-MCNC: 0.95 MG/DL — SIGNIFICANT CHANGE UP (ref 0.5–1.3)
EOSINOPHIL # BLD AUTO: 1.6 K/UL — HIGH (ref 0–0.5)
EOSINOPHIL NFR BLD AUTO: 13.6 % — HIGH (ref 0–6)
GLUCOSE SERPL-MCNC: 155 MG/DL — HIGH (ref 70–99)
HCT VFR BLD CALC: 47.4 % — SIGNIFICANT CHANGE UP (ref 39–50)
HGB BLD-MCNC: 15.4 G/DL — SIGNIFICANT CHANGE UP (ref 13–17)
LYMPHOCYTES # BLD AUTO: 2.4 K/UL — SIGNIFICANT CHANGE UP (ref 1–3.3)
LYMPHOCYTES # BLD AUTO: 20.5 % — SIGNIFICANT CHANGE UP (ref 13–44)
MCHC RBC-ENTMCNC: 29.1 PG — SIGNIFICANT CHANGE UP (ref 27–34)
MCHC RBC-ENTMCNC: 32.5 GM/DL — SIGNIFICANT CHANGE UP (ref 32–36)
MCV RBC AUTO: 89.6 FL — SIGNIFICANT CHANGE UP (ref 80–100)
MONOCYTES # BLD AUTO: 0.9 K/UL — SIGNIFICANT CHANGE UP (ref 0–0.9)
MONOCYTES NFR BLD AUTO: 7.7 % — SIGNIFICANT CHANGE UP (ref 2–14)
NEUTROPHILS # BLD AUTO: 6.8 K/UL — SIGNIFICANT CHANGE UP (ref 1.8–7.4)
NEUTROPHILS NFR BLD AUTO: 57.4 % — SIGNIFICANT CHANGE UP (ref 43–77)
PLATELET # BLD AUTO: 280 K/UL — SIGNIFICANT CHANGE UP (ref 150–400)
POTASSIUM SERPL-MCNC: 3.9 MMOL/L — SIGNIFICANT CHANGE UP (ref 3.5–5.3)
POTASSIUM SERPL-SCNC: 3.9 MMOL/L — SIGNIFICANT CHANGE UP (ref 3.5–5.3)
PROT SERPL-MCNC: 7.5 G/DL — SIGNIFICANT CHANGE UP (ref 6–8.3)
RBC # BLD: 5.29 M/UL — SIGNIFICANT CHANGE UP (ref 4.2–5.8)
RBC # FLD: 13.3 % — SIGNIFICANT CHANGE UP (ref 10.3–14.5)
SODIUM SERPL-SCNC: 142 MMOL/L — SIGNIFICANT CHANGE UP (ref 135–145)
WBC # BLD: 11.8 K/UL — HIGH (ref 3.8–10.5)
WBC # FLD AUTO: 11.8 K/UL — HIGH (ref 3.8–10.5)

## 2017-10-06 PROCEDURE — 99284 EMERGENCY DEPT VISIT MOD MDM: CPT

## 2017-10-06 PROCEDURE — 80053 COMPREHEN METABOLIC PANEL: CPT

## 2017-10-06 PROCEDURE — 99283 EMERGENCY DEPT VISIT LOW MDM: CPT

## 2017-10-06 PROCEDURE — 85027 COMPLETE CBC AUTOMATED: CPT

## 2017-10-06 RX ORDER — IBUPROFEN 200 MG
0 TABLET ORAL
Qty: 0 | Refills: 0 | COMMUNITY

## 2017-10-06 NOTE — ED PROVIDER NOTE - ATTENDING CONTRIBUTION TO CARE
I performed a history and physical exam of the patient and discussed their management with the resident/ACP. I reviewed the resident/ACP's note and agree with the documented findings and plan of care.  attn - see MDD

## 2017-10-06 NOTE — ED PROVIDER NOTE - PHYSICAL EXAMINATION
NAD, Tachycardia (repeated with 96 ), Afebrile, No spinal tender, No elbow or wrist tender, Negative Phalen and Tinel sign, N/V- intact. NAD, Tachycardia (repeated with 96 ), Afebrile, No spinal tender, No elbow or wrist tender, Negative Phalen and Tinel sign, N/V- intact.      Attending note. Patient is alert and in acute distress. Patient has paresthesia in the median nerve distribution of both hands. There is no Tinel or Phalen sign. There is no signs of forearm entrapment or elbow entrapment of the median nerve. So pulses are normal. Capillary refill is normal. Radial and ulnar nerve are intact to both motor and sensory. Abdomen is soft and nontender. Is no CVA tenderness. NAD, Tachycardia (repeated with 96 ), Afebrile, No spinal tender, No elbow or wrist tender/ swelling, Negative Phalen and Tinel sign, N/V- intact.      Attending note. Patient is alert and in acute distress. Patient has paresthesia in the median nerve distribution of both hands. There is no Tinel or Phalen sign. There is no signs of forearm entrapment or elbow entrapment of the median nerve. So pulses are normal. Capillary refill is normal. Radial and ulnar nerve are intact to both motor and sensory. Abdomen is soft and nontender. Is no CVA tenderness.

## 2017-10-06 NOTE — ED ADULT NURSE NOTE - OBJECTIVE STATEMENT
33 yo  male A&OX3 presents to the ED with the c/o b/l hand numbness and numbness is the first three fingers of b/l hands. Pt states that he has been sleeping on his back and started having b/l hand pain. No fevers or chills. Pt able to move his arms and hands, + sensation to touch . MAEX4. Pt states that he had drainage in his gallbladder for cholesytitist. Area appears clean, dry and intact. MAEX4

## 2017-10-06 NOTE — ED PROVIDER NOTE - CROS ED MUSC ALL NEG
Problem: Goal Outcome Summary  Goal: Goal Outcome Summary  Surgeon Discharge Plan: home with HH     Current Functional Status: Pt just returned from bathroom with nursing assist when OT arrived, declined to return to bathroom for at sink ADLS. Pt completed FB dressing except shoes which are slip on shoes SBA for standing balance only.       Barriers to Plan/Home: 1 stair     Pt to d/c home today with assist, GOALS PARTIALLY MET, see discharge summary       negative...

## 2017-10-06 NOTE — ED PROVIDER NOTE - CONDUCTED A DETAILED DISCUSSION WITH PATIENT AND/OR GUARDIAN REGARDING, MDM
return to ED if symptoms worsen, persist or questions arise/need for outpatient follow-up lab results/return to ED if symptoms worsen, persist or questions arise/need for outpatient follow-up

## 2017-10-06 NOTE — ED PROVIDER NOTE - MEDICAL DECISION MAKING DETAILS
Attending note-bilateral carpal tunnel syndrome. Left to electrolytes. Recommend bilateral wrist braces and follow up with sports medicine orthopedist. NSAIDs as tolerated.

## 2017-10-10 ENCOUNTER — OUTPATIENT (OUTPATIENT)
Dept: OUTPATIENT SERVICES | Facility: HOSPITAL | Age: 32
LOS: 1 days | End: 2017-10-10
Payer: SELF-PAY

## 2017-10-10 DIAGNOSIS — Z90.49 ACQUIRED ABSENCE OF OTHER SPECIFIED PARTS OF DIGESTIVE TRACT: Chronic | ICD-10-CM

## 2017-10-10 DIAGNOSIS — K81.1 CHRONIC CHOLECYSTITIS: ICD-10-CM

## 2017-10-10 PROCEDURE — 76080 X-RAY EXAM OF FISTULA: CPT

## 2017-10-10 PROCEDURE — C1769: CPT

## 2017-10-10 PROCEDURE — 76080 X-RAY EXAM OF FISTULA: CPT | Mod: 26

## 2017-10-10 PROCEDURE — 49424 ASSESS CYST CONTRAST INJECT: CPT

## 2018-01-02 NOTE — CONSULT NOTE ADULT - CONSULT REASON
After Visit Summary   1/2/2018    Feliciano Arreguin    MRN: [id number]           Patient Information     Date Of Birth          1994        Visit Information        Provider Department      1/2/2018 11:30 AM Eda De Dios MD [facility]        Today's Diagnoses     Type 1 diabetes mellitus not at goal (H)          Care Instructions    Basal rate changes:  12 midnight: 1.45   2 AM 1.5   6 AM 1.45   1 PM 1.5   6 PM 1.45         Please call to schedule morning, fasting labs.      Sending blood sugars to your provider at [facility]:  We want to help you with your diabetes management, which often requires frequent adjustments to your therapy. For your convenience, we have several ways to send your blood sugars to your doctor for review.    - Send message directly to your doctor through My Chart.  Please ask the rooming staff if you would like to sign up for My Chart.  This is a fast and confidential way to send your information and communicate directly with your provider.   - Record readings and fax to [phone].  We have a template for you to use for your convenience.  - Stop by the clinic with your meter for download if advised by provider.   - My Chart or call Anna Black, Diabetes Educator at [phone]  - Call the clinic and speak to one of the endocrine nurses to relay information on the telephone.  Brandy Gomes, or Gabriela at [phone].   -    Please call the on-call Endocrinologist at the [facility] for after       hours/weekend needs at [phone] Option #4.    Please note that you do not need to FAST if you are just having an A1C drawn. Please remember to ALWAYS bring your glucose meter with to your appointment. This data is very important for the management of your care.    Thank you!  Your [facility] Team                Follow-ups after your visit        Your next 10 appointments already scheduled     Jul 24, 2018  9:45 AM CDT   Return  Visit with Eda De Dios MD, MG ENDO NURSE   [facility]    [address] [address] [place] [id number] [phone]              Future tests that were ordered for you today     Open Future Orders        Priority Expected Expires Ordered    Hematocrit Routine 1/10/2018 1/2/2019 1/2/2018            Who to contact     If you have questions or need follow up information about today's clinic visit or your schedule please contact [facility] directly at [phone].  Normal or non-critical lab and imaging results will be communicated to you by [employer], letter or phone within 4 business days after the clinic has received the results. If you do not hear from us within 7 days, please contact the clinic through [employer] or phone. If you have a critical or abnormal lab result, we will notify you by phone as soon as possible.  Submit refill requests through [employer] or call your pharmacy and they will forward the refill request to us. Please allow 3 business days for your refill to be completed.          Additional Information About Your Visit        MyChart Information     [employer] gives you secure access to your electronic health record. If you see a primary care provider, you can also send messages to your care team and make appointments. If you have questions, please call your primary care clinic.  If you do not have a primary care provider, please call [phone] and they will assist you.      [employer] is an electronic gateway that provides easy, online access to your medical records. With [employer], you can request a clinic appointment, read your test results, renew a prescription or communicate with your care team.     To access your existing account, please contact your [facility] or call [phone] for assistance.        Care EveryWhere ID     This is your Care EveryWhere ID. This could be used by other  organizations to access your [facility] medical records  [id number]        Your Vitals Were     Pulse                   130            Blood Pressure from Last 3 Encounters:   01/02/18 (!) 154/98   12/08/17 156/72   11/07/17 124/84    Weight from Last 3 Encounters:   12/08/17 64.5 kg (142 lb 1.6 oz)   11/07/17 64.6 kg (142 lb 8 oz)   09/21/17 63 kg (139 lb)              We Performed the Following     Hemoglobin A1c POCT        Primary Care Provider Office Phone # Fax #    Stacey Sandoval PA-C [phone] [address] [place] [address]        Equal Access to Services     [facility] : Hadii aad ku hadasho Soquinali, waaxda luqadaha, qaybta kaalmada adepreetyada, juanito pierre . So [facility] [phone].    ATENCIÓN: Si habla español, tiene a theodore disposición servicios gratuitos de asistencia lingüística. Llame al [phone].    We comply with applicable federal civil rights laws and [place] laws. We do not discriminate on the basis of race, color, national origin, age, disability, sex, sexual orientation, or gender identity.            Thank you!     Thank you for choosing [facility]  for your care. Our goal is always to provide you with excellent care. Hearing back from our patients is one way we can continue to improve our services. Please take a few minutes to complete the written survey that you may receive in the mail after your visit with us. Thank you!             Your Updated Medication List - Protect others around you: Learn how to safely use, store and throw away your medicines at www.disposemymeds.org.          This list is accurate as of: 1/2/18 12:12 PM.  Always use your most recent med list.                   Brand Name Dispense Instructions for use Diagnosis    blood glucose monitoring meter device kit    no brand specified    1 kit    1 kit 3 times daily.    Type 1 diabetes, HbA1c goal < 7% (H)       blood glucose monitoring test strip  "   RACHEL CONTOUR NEXT    100 strip    Use to test blood sugar 4 times daily or as directed.    Type 1 diabetes mellitus, uncontrolled (H)       Blood Pressure Kit     1 kit    1 kit daily    Secondary hypertension, unspecified       insulin glargine 100 UNIT/ML injection    LANTUS SOLOSTAR    3 Month    42 units daily    Type 1 diabetes, HbA1c goal < 7% (H)       * insulin lispro 100 UNIT/ML injection    HumaLOG KWIKpen    3 Month    Inject 1 U per 5 grams CHO. Total daily dose ~ 40 U.    Type 1 diabetes, HbA1c goal < 7% (H)       * insulin lispro 100 UNIT/ML injection    humaLOG    8 vial    Uses up to 75 units per day in insulin pump for basal, bolus, and priming of insulin pump tubing.    Type 1 diabetes mellitus not at goal (H)       insulin pen needle 31G X 8 MM    B-D U/F    200 each    Use 6 daily or as directed.    Type 1 diabetes, HbA1c goal < 7% (H)       insulin syringe-needle U-100 31G X 5/16\" 0.5 ML    GNP ULTRA COM INSULIN SYRINGE    720 each    Any brand, to be used with subcutaneous insulin injection 4-8 times/day.    Type 1 diabetes, HbA1c goal < 7% (H)       medroxyPROGESTERone 150 MG/ML injection    DEPO-PROVERA    3 mL    Inject 1 mL (150 mg) into the muscle every 3 months    Encounter for surveillance of injectable contraceptive       * Notice:  This list has 2 medication(s) that are the same as other medications prescribed for you. Read the directions carefully, and ask your doctor or other care provider to review them with you.      " Gallbladder fossa abscess

## 2019-08-06 NOTE — PROGRESS NOTE ADULT - PROBLEM SELECTOR PLAN 2
- patient went for ERCP, will f/u on new LFTs  - Hep panel unremarkable, CMV IgG positive, but IgM negative, other work up pending (ERMIAS, smooth muscle, microsomal) - - -

## 2019-09-03 NOTE — DISCHARGE NOTE ADULT - NSFTFSERV1RD_GEN_ALL_CORE
Patient has done well in recovery. He has slept for most of it. Medicated with oral pain medications. Denies any numbness or tingling to his Right leg. Scripts on chart and tolerating po well. Attempted to contact his son, voicemail is full.   wound care and assessment/teaching and training

## 2019-12-17 NOTE — ED PROVIDER NOTE - EKG #1 DATE/TIME
[facility]        NAME: Aman Herring is a 27 y o  female  : 1985    MRN: [id number]  DATE: 2019  TIME: 11:51 AM    Assessment and Plan   Fall, initial encounter [W19  XXXA]  1  Fall, initial encounter  XR spine cervical 2 or 3 vw injury    XR shoulder 2+ vw right   2  Neck pain  XR spine cervical 2 or 3 vw injury    ketorolac (TORADOL) injection 30 mg   3  Acute pain of right shoulder  XR shoulder 2+ vw right    ketorolac (TORADOL) injection 30 mg   4  Acute right-sided low back pain with right-sided sciatica       Preliminary x-ray showed no acute fracture dislocation  Patient given Toradol in clinic  Advised to use Motrin or Tylenol for pain and ice the affected areas  Recommended follow-up with PCP and to report to ER if symptoms worsen    Patient Instructions         Chief Complaint     Chief Complaint   Patient presents with    Fall     slipped on ice and hit back of head on step   feeling numbness and tingling in right leg, right arm  and up into neck  pt stated after she got to work her vision was like Ashleigh Barer in right eye  has very limited rom          History of Present Illness         Patient is a 50-year-old female who presents today after sustaining a fall this morning  Patient states she was walking down her front steps and slipped and fell backwards and hit her head  She denies loss of consciousness  She does report some pain in her lower neck and a dull headache  She denies any nausea or vomiting or blurred vision  She did have some Kaleidescope vision while at work but this has since resolved today  She also complains of right shoulder pain with some arm tingling and numbness since the fall as well as some right-sided low back pain with associated numbness and tingling of the right leg  Review of Systems   Review of Systems   Constitutional: Negative for fever  Eyes: Negative for photophobia, pain and visual disturbance     Respiratory: Negative for shortness of breath  Cardiovascular: Negative for chest pain  Gastrointestinal: Negative for nausea and vomiting  Musculoskeletal: Positive for arthralgias, back pain and neck pain  Negative for joint swelling  Skin: Negative for color change  Neurological: Positive for headaches  Negative for dizziness  Current Medications     No current outpatient medications on file  No current facility-administered medications for this visit  Current Allergies     Allergies as of 12/17/2019    (No Known Allergies)            The following portions of the patient's history were reviewed and updated as appropriate: allergies, current medications, past family history, past medical history, past social history, past surgical history and problem list      History reviewed  No pertinent past medical history  History reviewed  No pertinent surgical history  History reviewed  No pertinent family history  Medications have been verified  Objective   /98   Pulse 83   Temp 98 °F (36 7 °C)   Resp 20   SpO2 98%        Physical Exam     Physical Exam   Constitutional: She is oriented to person, place, and time  She appears well-developed and well-nourished  HENT:   Head: Normocephalic and atraumatic  Mouth/Throat: Oropharynx is clear and moist    Eyes: Pupils are equal, round, and reactive to light  Conjunctivae and EOM are normal    Neck: Neck supple  Cardiovascular: Normal rate and regular rhythm  Pulmonary/Chest: Effort normal and breath sounds normal    Musculoskeletal: She exhibits tenderness  She exhibits no edema or deformity  Right shoulder: She exhibits decreased range of motion, tenderness and pain  She exhibits no bony tenderness, no swelling, no effusion, no crepitus, no deformity, no laceration, no spasm, normal pulse and normal strength     Sensation intact, RP pulse 2+  TTP over posterior right shoulder with limited ROM due to pain    TTP over right lower back in lumbar musculature, no focal bony tenderness, edema, bruising  DP pulse 2+  FROM of bilateral legs   Lymphadenopathy:     She has no cervical adenopathy  Neurological: She is alert and oriented to person, place, and time  No cranial nerve deficit  GCS eye subscore is 4  GCS verbal subscore is 5  GCS motor subscore is 6  Skin: Skin is warm and dry  17-Jul-2017 21:04

## 2020-09-14 NOTE — PATIENT PROFILE ADULT. - NS MD HP PRESSURE ULCER DRAIN
INTERNAL MEDICINE PROGRESS NOTE    Patient: Mikel Emerson Date: 2020   : 1944 Attending: Eleazar Ramírez MD   76 year old male      Chief Complaint:  Recurrent UTI    Subjective:  Resting in the bed, no acute events overnight, still c/o urinary frequency, denies hematuria, abdominal pain.  No fever     Problem List:   Patient Active Problem List   Diagnosis   • Pain   • Atrial fibrillation (CMS/HCC)   • Nonsustained ventricular tachycardia (CMS/HCC)   • Amiodarone therapy   • Acute peptic ulcer, unspecified site, with hemorrhage, without mention of obstruction   • GI bleed - 10/2011 - Has WATCHMAN DEVICE 2012   • DJD (degenerative joint disease) of knee   • Hyperlipidemia   • Iron deficiency anemia secondary to blood loss (chronic)   • Knee joint replacement by other means   • Aortic stenosis   • Aortic regurgitation   • Atrial flutter (CMS/HCC)   • Other hammer toe (acquired)   • Talipes cavus   • DJD of shoulder   • Abnormal LFTs   • Syncope   • Essential hypertension   • Non morbid obesity due to excess calories   • Shortness of breath   • Preop cardiovascular exam   • Status post reverse total arthroplasty of right shoulder   • Type 2 diabetes mellitus with hyperglycemia, without long-term current use of insulin (CMS/HCC)   • Pacemaker, Medtronic, dual chamber   • Bradycardia   • Polyneuropathy   • Lumbar radiculopathy   • S/P ACDF C5-6 and C6-7        ALLERGIES:   Allergen Reactions   • Biaxin [Clarithromycin] GI UPSET          • Clonidine Hydrochloride DIZZINESS and WEAKNESS          • Levofloxacin Nausea & Vomiting   • Amoxicillin NAUSEA   • Gabapentin Other (See Comments)       Review of Systems: A 10 point review of systems is negative except as in the HPI    Vital Last Value 24 Hour Range   Temperature 98 °F (36.7 °C) (20 0507) Temp  Min: 98 °F (36.7 °C)  Max: 99.3 °F (37.4 °C)   Pulse 65 (20 0839) Pulse  Min: 65  Max: 76   Respiratory 16 (20 0507) Resp  Min: 16  Max: 20    Non-Invasive  Blood Pressure 105/65 (09/14/20 0839) BP  Min: 105/65  Max: 138/70   Pulse Oximetry 93 % (09/14/20 0507) SpO2  Min: 93 %  Max: 96 %     Vital Today Admitted   Weight 89.8 kg (09/14/20 0507) Weight: 94.5 kg (09/11/20 1649)   Height N/A Height: 6' (182.9 cm) (09/11/20 1649)   BMI N/A BMI (Calculated): 28.25 (09/11/20 1649)     Intake and Output:    Intake/Output Summary (Last 24 hours) at 9/14/2020 1307  Last data filed at 9/14/2020 1015  Gross per 24 hour   Intake 660 ml   Output 150 ml   Net 510 ml         Physical Examination :  General:  Alert, awake, no acute distress, non-toxic appearance  Head:  Atraumatic, normocephalic  Neck:  Supple  Respiratory:  Breath sounds clear on both sides, no wheezing   Cardiovascular:  RRR, S1 and S2 normal, no murmurs  GI:  Soft, nontender, nondistended  Musculoskeletal:  No edema in bilateral lower extremities. No tenderness nor deformities  Skin:  No rash  Neurologic:  Alert, oriented x 3, no focal deficits noted       Medications :   Scheduled  • [Held by provider] indomethacin  25 mg Oral TID WC   • mirabegron ER  25 mg Oral Daily   • insulin regular (human)   Subcutaneous TID AC   • eplerenone  50 mg Oral Daily   • fluticasone  1 spray Each Nare BID   • furosemide  20 mg Oral Daily   • nicotinic acid  1,000 mg Oral Nightly   • azelastine  1 spray Each Nare BID   • ascorbic acid  1,000 mg Oral Daily   • chlorthalidone  50 mg Oral Daily   • AMIODarone  200 mg Oral Once per day on Mon Tue Wed Thu Fri   • amLODIPine  5 mg Oral Daily   • aspirin  81 mg Oral Daily   • atorvastatin  20 mg Oral Nightly   • DULoxetine  60 mg Oral QHS   • pantoprazole  40 mg Oral QAM AC   • metoPROLOL succinate  25 mg Oral Daily   • lisinopril  20 mg Oral Daily   • cefTRIAXone (ROCEPHIN) IV  1,000 mg Intravenous Daily   • sodium chloride (PF)  2 mL Intracatheter 2 times per day   • heparin (porcine)  5,000 Units Subcutaneous 3 times per day       Continuous infusion  • dextrose 5 %  infusion         Laboratory Results:   Recent Labs   Lab 09/14/20  0500 09/13/20  0454 09/12/20  0421  09/11/20  1233   WBC 10.0 10.4 8.0  --  9.4   HCT 31.9* 32.7* 30.6*  --  32.2*   HGB 9.8* 10.0* 9.3*  --  9.6*    336 329  --  374   INR  --   --  1.0  --  1.0   SODIUM 134* 137 139  --  137   POTASSIUM 3.7 3.9 3.8  --  4.3   CHLORIDE 98 103 106  --  105   CO2 26 26 25  --  24   CALCIUM 9.2 9.9 9.4  --  9.7   GLUCOSE 204* 230* 178*  --  203*   BUN 38* 29* 28*  --  35*   CREATININE 1.99* 1.37* 1.33*   < > 1.50*   AST  --   --   --   --  14   GPT  --   --   --   --  15   ALKPT  --   --   --   --  114   BILIRUBIN  --   --   --   --  0.3   ALBUMIN  --   --   --   --  3.2*    < > = values in this interval not displayed.       Imaging Results Reviewed Include:  CT UROGRAM W CONTRAST 9/13/2020  IMPRESSION: 1.  No discrete, drainable fluid collection within the region of the prostate to suggest abscess. 2.  No evidence of obstructive uropathy, nephrolithiasis, or abnormal urothelial thickening. 3.  Extensive sigmoid colonic diverticulosis. I have personally reviewed the images and modified the resident's report as necessary.      Assessment and plan:  1. Suspected recurrent UTI (E. Coli)        -final UA culture is pending        -CT urogram showed no evidence of fluid collection within prostate area        -continue IV ceftriaxone        -urologist on board, will provide recommendations regarding prostate ultrasound        -continue myrbetriq (prescribed out of system urologist per patient)    2. Gout in bilateral feet, not in exacerbation        -no steroids given current infection, no bulges and given amiodarone interaction        -hold indomethacin d/t Cr above baseline, will reevaluate tomorrow    3. Atrial fibrillation, unspecified, s/p ablation and Watchman device - on amiodarone, metoprolol, not on anticoagulation (recent GI bleed)    4. Essential hypertension - on Lasix, lisinopril, amlodipine, metoprolol,  chlorthalidone    5. DM type 2, uncomplicated - holding metformin, continue sliding scale insulin    6. Chronic kidney disease stage 3 - Cr above baseline, recheck in a.m. Holding indomethacin      DVT/VTE Prophylaxis    Discussed with RN, urology team      Code status:  Full Resuscitation    Discharge Plan  Estimated Discharge Date: 9/15/2020  Discharge Destination: Home      Eleazar Ramírez MD  [phone]    Contact the Hospitalist caring for the patient until 7pm.   From 7pm to 7am contact the Hospitalist on call     no

## 2021-10-05 NOTE — PROGRESS NOTE ADULT - ASSESSMENT
33 y/o male with choledocholithiasis s/p ERCP x2    - Tbili downtrending (4.5 today)  - Will tentatively plan for laparoscopic cholecystectomy, possibly tomorrow, pending repeat Tbili in AM. Want to see Tbili continue to downtrend prior to taking pt to OR. D/w patient/family, medical team, & Dr. Palacios [Follow-Up: _____] : a [unfilled] follow-up visit [FreeTextEntry1] : Gallbladder polyp

## 2022-05-18 NOTE — PROGRESS NOTE ADULT - SUBJECTIVE AND OBJECTIVE BOX
no edema,  no murmurs,  regular rate and rhythm , no edema. Chief Complaint: choledocholithiasis       Interval Events: s/p ERCP yesterday with CBD stone removal by sphincterotomy and balloon sweeps. Afebrile. Feeling well. No abdominal pain/nausea/vomiting.     Allergies:  No Known Allergies      Home Medications:    Hospital Medications:      PMHX/PSHX:  Eczema, unspecified type  No pertinent past medical history  No significant past surgical history      Family history:  Family history of gallbladder disease (Father, Grandparent)  Family history of coronary artery disease in mother (Mother)      ROS: as per HPI     PHYSICAL EXAM:   Vital Signs:  Vital Signs Last 24 Hrs  T(C): 36.6 (22 Jul 2017 05:31), Max: 36.6 (22 Jul 2017 05:31)  T(F): 97.9 (22 Jul 2017 05:31), Max: 97.9 (22 Jul 2017 05:31)  HR: 66 (22 Jul 2017 05:31) (65 - 71)  BP: 136/84 (22 Jul 2017 05:31) (136/84 - 138/90)  BP(mean): --  RR: 18 (22 Jul 2017 05:31) (18 - 18)  SpO2: 96% (22 Jul 2017 05:31) (94% - 98%)  Daily     Daily     HEENT:  NC/AT,  conjunctivae clear,  slceral icterus  CHEST:  Full & symmetric excursion, no increased effort, breath sounds clear  HEART:  Regular rhythm, S1, S2, no murmur/rub/S3/S4,  no edema  ABDOMEN:  Soft, non-tender, non-distended, normoactive bowel sounds,  no masses ,no hepato-splenomegaly,   EXTREMITIES:  no cyanosis,clubbing or edema  SKIN:  No rash/erythema/ecchymoses/petechiae/wounds/abscess/warm/dry  NEURO:  Alert, oriented  LABS:                        15.2   7.2   )-----------( 203      ( 22 Jul 2017 07:06 )             46.5     07-22    137  |  98  |  8   ----------------------------<  110<H>  3.9   |  26  |  1.02    Ca    10.0      22 Jul 2017 07:06  Phos  3.6     07-22  Mg     2.4     07-22    TPro  7.5  /  Alb  x   /  TBili  8.3<H>  /  DBili  x   /  AST  277<H>  /  ALT  x   /  AlkPhos  245<H>  07-22    LIVER FUNCTIONS - ( 22 Jul 2017 07:06 )  Alb: x     / Pro: 7.5 g/dL / ALK PHOS: 245 U/L / ALT: x     / AST: 277 U/L / GGT: x                   Imaging:      [facility]  ____________________________________________________________________________________________________  Patient Name: Stanislaw Mora                      MRN: [id number]  Account Number: [id number]                     YOB: 1985  Room: Endoscopy Room 4                           Gender: Male  Attending MD: PRADEEP JACOB MD                 Procedure Date No Time: 7/21/2017  ____________________________________________________________________________________________________     Procedure:           ERCP  Indications:         Evaluation and possible treatment of bile duct stone(s)  Providers:           PRADEEP JACOB MD, Rick Badillo MD (Fellow)  Medicines:           General Anesthesia, Indomethacin 100 mg GA  Complications:       No immediate complications.  ____________________________________________________________________________________________________  Procedure:           Pre-Anesthesia Assessment:                       - Prior to the procedure, a History and Physical was performed, and patient                        medications, allergies and sensitivities were reviewed. The patient's                        tolerance of previous anesthesia was reviewed.                       - The risks and benefits of the procedure and the sedation options and risks                        were discussed with the patient. All questions were answered and informed                        consent was obtained.                       - Patient identification and proposed procedure were verified prior to the                        procedure by the physician, the nurse and the anesthetist. The procedure was                        verified in the procedure room.                       After obtaining informed consent, the scope was passed under direct vision.                        Throughout the procedure, the patient's blood pressure, pulse, and oxygen                        saturations were monitored continuously. The Duodenoscope was introduced                        through the mouth, and advanced to the duodenum and used to inject contrast                        into the bile duct. The ERCP was accomplished without difficulty. The patient                        tolerated the procedure well.                                                                                                        Findings:       The  film was normal. The esophagus was successfully intubated under direct vision. The        scope was advanced to a normal major papilla in the descending duodenum without detailed        examination of the pharynx, larynx and associated structures, and upper GI tract. The upper        GI tract was grossly normal. The bile duct deeply cannulated with the sphincterotome.        Contrast was injected. I personally interpreted the bile duct images. There was brisk flow of        contrast through the ducts. The in the biliary system was normal. Biliary sphincterotomy was        made with a traction (standard) sphincterotome. There was no post-sphincterotomy bleeding. To        discover objects, the biliary tree was swept with an 11 mm balloon starting at the        bifurcation. Sludge and fragments of stones were swept from the duct. Final cholangiogram        showed no remaining stones.                                                                                                        Impression:          - Choledocholithiasis s/p ERCP with extraction of stones/sludge and                        sphincterotomy.  Recommendation:      - Return patient to hospital mata for ongoing care. Continue to trend LFTs.                       - Clear liquid diet if tolerated, and advance tomorrow                       - Referral to surgery for cholecystectomy                                                                                                        Attending Participation:       I was present and participated during the entire procedure, including non-key portions.                                                                                                          _________________  PRADEEP JACOB MD  7/21/2017 2:06:21 PM  This report has been signed electronically.  Number of Addenda: 0    Note Initiated On: 7/21/2017 10:15 AM

## 2022-08-08 ENCOUNTER — NON-APPOINTMENT (OUTPATIENT)
Age: 37
End: 2022-08-08

## 2022-10-24 NOTE — H&P ADULT - HISTORY OF PRESENT ILLNESS
I and D of paronychia preformed  Wound culture done  Start Keflex for 5 days  Return on 3 days if no imporvement
31 y/o male with recent admission for choledocholithiasis s/p ERCP x2 & laparoscopic partial cholecystectomy with subhepatic JOHANNA drain in place now p/w increasing RUQ abdominal pain, fevers, & chills. On exam his abdomen is soft, non-distended with moderate RUQ TTP. Incisions C/D/I. No erythema /drainage. JOHANNA bulb lightly serous/bilious.

## 2023-06-15 NOTE — H&P ADULT - PROBLEM/PLAN-4
Patient: Jayde Lockett    Procedure: Procedure(s):  COLONOSCOPY       Diagnosis: Screen for colon cancer [Z12.11]  Diagnosis Additional Information: No value filed.    Anesthesia Type:   MAC     Note:    Oropharynx: oropharynx clear of all foreign objects and spontaneously breathing  Level of Consciousness: drowsy  Oxygen Supplementation: room air      Dentition: dentition unchanged  Vital Signs Stable: post-procedure vital signs reviewed and stable  Report to RN Given: handoff report given  Patient transferred to: Phase II    Handoff Report: Identifed the Patient, Identified the Reponsible Provider, Reviewed the pertinent medical history, Discussed the surgical course, Reviewed Intra-OP anesthesia mangement and issues during anesthesia, Set expectations for post-procedure period and Allowed opportunity for questions and acknowledgement of understanding      Vitals:  Vitals Value Taken Time   BP 91/54 06/15/23 1433   Temp 96.8  F (36  C) 06/15/23 1433   Pulse 64    Resp 16 06/15/23 1433   SpO2 100 % 06/15/23 1433       Electronically Signed By: ADALID Huston CRNA  Lina 15, 2023  2:36 PM  
DISPLAY PLAN FREE TEXT

## 2024-01-11 NOTE — ED ADULT NURSE NOTE - CHIEF COMPLAINT QUOTE
well developed, well nourished , in no acute distress , ambulating without difficulty , normal communication ability
fever, s/p heaven 8/1
